# Patient Record
Sex: FEMALE | Race: WHITE | NOT HISPANIC OR LATINO | URBAN - METROPOLITAN AREA
[De-identification: names, ages, dates, MRNs, and addresses within clinical notes are randomized per-mention and may not be internally consistent; named-entity substitution may affect disease eponyms.]

---

## 2018-03-20 ENCOUNTER — APPOINTMENT (RX ONLY)
Dept: URBAN - METROPOLITAN AREA CLINIC 23 | Facility: CLINIC | Age: 83
Setting detail: DERMATOLOGY
End: 2018-03-20

## 2018-03-20 DIAGNOSIS — L90.5 SCAR CONDITIONS AND FIBROSIS OF SKIN: ICD-10-CM

## 2018-03-20 DIAGNOSIS — D18.0 HEMANGIOMA: ICD-10-CM

## 2018-03-20 DIAGNOSIS — L72.8 OTHER FOLLICULAR CYSTS OF THE SKIN AND SUBCUTANEOUS TISSUE: ICD-10-CM

## 2018-03-20 DIAGNOSIS — L82.0 INFLAMED SEBORRHEIC KERATOSIS: ICD-10-CM

## 2018-03-20 DIAGNOSIS — Z85.828 PERSONAL HISTORY OF OTHER MALIGNANT NEOPLASM OF SKIN: ICD-10-CM

## 2018-03-20 DIAGNOSIS — L30.4 ERYTHEMA INTERTRIGO: ICD-10-CM

## 2018-03-20 DIAGNOSIS — L82.1 OTHER SEBORRHEIC KERATOSIS: ICD-10-CM

## 2018-03-20 DIAGNOSIS — L57.0 ACTINIC KERATOSIS: ICD-10-CM

## 2018-03-20 DIAGNOSIS — L81.4 OTHER MELANIN HYPERPIGMENTATION: ICD-10-CM

## 2018-03-20 PROBLEM — L85.3 XEROSIS CUTIS: Status: ACTIVE | Noted: 2018-03-20

## 2018-03-20 PROBLEM — E78.5 HYPERLIPIDEMIA, UNSPECIFIED: Status: ACTIVE | Noted: 2018-03-20

## 2018-03-20 PROBLEM — K21.9 GASTRO-ESOPHAGEAL REFLUX DISEASE WITHOUT ESOPHAGITIS: Status: ACTIVE | Noted: 2018-03-20

## 2018-03-20 PROBLEM — D18.01 HEMANGIOMA OF SKIN AND SUBCUTANEOUS TISSUE: Status: ACTIVE | Noted: 2018-03-20

## 2018-03-20 PROBLEM — M12.9 ARTHROPATHY, UNSPECIFIED: Status: ACTIVE | Noted: 2018-03-20

## 2018-03-20 PROCEDURE — 17000 DESTRUCT PREMALG LESION: CPT | Mod: 59

## 2018-03-20 PROCEDURE — ? OBSERVATION

## 2018-03-20 PROCEDURE — 17110 DESTRUCTION B9 LES UP TO 14: CPT

## 2018-03-20 PROCEDURE — 17003 DESTRUCT PREMALG LES 2-14: CPT

## 2018-03-20 PROCEDURE — ? COUNSELING

## 2018-03-20 PROCEDURE — ? OTHER

## 2018-03-20 PROCEDURE — 99203 OFFICE O/P NEW LOW 30 MIN: CPT | Mod: 25

## 2018-03-20 PROCEDURE — ? LIQUID NITROGEN

## 2018-03-20 PROCEDURE — ? TREATMENT REGIMEN

## 2018-03-20 ASSESSMENT — LOCATION DETAILED DESCRIPTION DERM
LOCATION DETAILED: NASAL SUPRATIP
LOCATION DETAILED: RIGHT DISTAL POSTERIOR THIGH
LOCATION DETAILED: LEFT MID-UPPER BACK
LOCATION DETAILED: LEFT DISTAL POSTERIOR UPPER ARM
LOCATION DETAILED: RIGHT INFERIOR UPPER BACK
LOCATION DETAILED: LEFT INFERIOR LATERAL MIDBACK
LOCATION DETAILED: LEFT SUPERIOR LATERAL MIDBACK
LOCATION DETAILED: RIGHT INFERIOR MEDIAL MIDBACK
LOCATION DETAILED: LEFT SUPERIOR MEDIAL MALAR CHEEK
LOCATION DETAILED: NASAL DORSUM
LOCATION DETAILED: LEFT CENTRAL MALAR CHEEK
LOCATION DETAILED: RIGHT PROXIMAL PRETIBIAL REGION
LOCATION DETAILED: RIGHT RIB CAGE
LOCATION DETAILED: RIGHT ANTERIOR PROXIMAL UPPER ARM
LOCATION DETAILED: RIGHT LATERAL ABDOMEN
LOCATION DETAILED: LEFT RIB CAGE
LOCATION DETAILED: LEFT INFERIOR MEDIAL MIDBACK
LOCATION DETAILED: RIGHT SUPERIOR MEDIAL MIDBACK

## 2018-03-20 ASSESSMENT — LOCATION SIMPLE DESCRIPTION DERM
LOCATION SIMPLE: RIGHT UPPER BACK
LOCATION SIMPLE: RIGHT PRETIBIAL REGION
LOCATION SIMPLE: LEFT CHEEK
LOCATION SIMPLE: RIGHT LOWER BACK
LOCATION SIMPLE: LEFT MALAR CHEEK
LOCATION SIMPLE: RIGHT POSTERIOR THIGH
LOCATION SIMPLE: ABDOMEN
LOCATION SIMPLE: RIGHT UPPER ARM
LOCATION SIMPLE: NOSE
LOCATION SIMPLE: LEFT UPPER BACK
LOCATION SIMPLE: LEFT LOWER BACK
LOCATION SIMPLE: LEFT POSTERIOR UPPER ARM

## 2018-03-20 ASSESSMENT — LOCATION ZONE DERM
LOCATION ZONE: LEG
LOCATION ZONE: NOSE
LOCATION ZONE: FACE
LOCATION ZONE: ARM
LOCATION ZONE: TRUNK

## 2018-03-20 NOTE — PROCEDURE: LIQUID NITROGEN
Render Post-Care Instructions In Note?: no
Post-Care Instructions: I reviewed with the patient in detail post-care instructions. Patient is to wear sunprotection, and avoid picking at any of the treated lesions. Pt may apply Vaseline to crusted or scabbing areas.
Detail Level: Detailed
Number Of Freeze-Thaw Cycles: 1 freeze-thaw cycle
Detail Level: Simple
Duration Of Freeze Thaw-Cycle (Seconds): 0
Consent: The patient's verbal consent was obtained including but not limited to risks of crusting, scabbing, blistering, scarring, darker or lighter pigmentary change, recurrence, incomplete removal and infection.
Medical Necessity Information: It is in your best interest to select a reason for this procedure from the list below. All of these items fulfill various CMS LCD requirements except the new and changing color options.
Medical Necessity Clause: This procedure was medically necessary because the lesions that were treated were: rubbed

## 2018-03-20 NOTE — PROCEDURE: OTHER
Detail Level: Zone
Other (Free Text): 5mm, deeper. Offered to send to general surgeon but she would like to wait. Will see Pt back in 3mos and recheck area. Call if worse in any way. Pt and son indicate understanding.
Note Text (......Xxx Chief Complaint.): This diagnosis correlates with the

## 2018-03-20 NOTE — PROCEDURE: TREATMENT REGIMEN
Detail Level: Zone
Plan: Triple paste AF bid, zeasorb AF powder several times a day. Try to let air/ dry out a few times a day.

## 2018-03-29 ENCOUNTER — HOSPITAL ENCOUNTER (INPATIENT)
Age: 83
LOS: 7 days | Discharge: NURSING, REHAB OR LONG-TERM CARE | DRG: 308 | End: 2018-04-05
Attending: EMERGENCY MEDICINE | Admitting: INTERNAL MEDICINE
Payer: MEDICARE

## 2018-03-29 ENCOUNTER — APPOINTMENT (OUTPATIENT)
Dept: GENERAL RADIOLOGY | Age: 83
DRG: 308 | End: 2018-03-29
Attending: EMERGENCY MEDICINE
Payer: MEDICARE

## 2018-03-29 DIAGNOSIS — I48.91 ATRIAL FIBRILLATION WITH RVR (HCC): Primary | ICD-10-CM

## 2018-03-29 DIAGNOSIS — I50.9 ACUTE CONGESTIVE HEART FAILURE, UNSPECIFIED CONGESTIVE HEART FAILURE TYPE: ICD-10-CM

## 2018-03-29 LAB
ALBUMIN SERPL-MCNC: 3.6 G/DL (ref 3.2–4.6)
ALBUMIN/GLOB SERPL: 1.1 {RATIO} (ref 1.2–3.5)
ALP SERPL-CCNC: 61 U/L (ref 50–136)
ALT SERPL-CCNC: 29 U/L (ref 12–65)
AMORPH CRY URNS QL MICRO: ABNORMAL
ANION GAP SERPL CALC-SCNC: 8 MMOL/L (ref 7–16)
APPEARANCE UR: ABNORMAL
AST SERPL-CCNC: 41 U/L (ref 15–37)
ATRIAL RATE: 129 BPM
ATRIAL RATE: 267 BPM
BACTERIA URNS QL MICRO: ABNORMAL /HPF
BILIRUB SERPL-MCNC: 0.5 MG/DL (ref 0.2–1.1)
BILIRUB UR QL: NEGATIVE
BNP SERPL-MCNC: 351 PG/ML
BUN SERPL-MCNC: 17 MG/DL (ref 8–23)
CALCIUM SERPL-MCNC: 9.8 MG/DL (ref 8.3–10.4)
CALCULATED P AXIS, ECG09: 115 DEGREES
CALCULATED R AXIS, ECG10: -4 DEGREES
CALCULATED R AXIS, ECG10: -9 DEGREES
CALCULATED T AXIS, ECG11: 158 DEGREES
CALCULATED T AXIS, ECG11: 160 DEGREES
CASTS URNS QL MICRO: ABNORMAL /LPF
CHLORIDE SERPL-SCNC: 105 MMOL/L (ref 98–107)
CO2 SERPL-SCNC: 26 MMOL/L (ref 21–32)
COLOR UR: YELLOW
CREAT SERPL-MCNC: 0.56 MG/DL (ref 0.6–1)
DIAGNOSIS, 93000: NORMAL
DIAGNOSIS, 93000: NORMAL
DIGOXIN SERPL-MCNC: 0.8 NG/ML (ref 0.9–2.1)
EPI CELLS #/AREA URNS HPF: ABNORMAL /HPF
GLOBULIN SER CALC-MCNC: 3.3 G/DL (ref 2.3–3.5)
GLUCOSE SERPL-MCNC: 109 MG/DL (ref 65–100)
GLUCOSE UR STRIP.AUTO-MCNC: NEGATIVE MG/DL
HGB UR QL STRIP: NEGATIVE
KETONES UR QL STRIP.AUTO: NEGATIVE MG/DL
LACTATE BLD-SCNC: 2 MMOL/L (ref 0.5–1.9)
LEUKOCYTE ESTERASE UR QL STRIP.AUTO: ABNORMAL
MAGNESIUM SERPL-MCNC: 1.8 MG/DL (ref 1.8–2.4)
MUCOUS THREADS URNS QL MICRO: ABNORMAL /LPF
NITRITE UR QL STRIP.AUTO: NEGATIVE
PH UR STRIP: 7 [PH] (ref 5–9)
POTASSIUM SERPL-SCNC: 4.4 MMOL/L (ref 3.5–5.1)
PROCALCITONIN SERPL-MCNC: <0.1 NG/ML
PROT SERPL-MCNC: 6.9 G/DL (ref 6.3–8.2)
PROT UR STRIP-MCNC: NEGATIVE MG/DL
Q-T INTERVAL, ECG07: 330 MS
Q-T INTERVAL, ECG07: 366 MS
QRS DURATION, ECG06: 150 MS
QRS DURATION, ECG06: 156 MS
QTC CALCULATION (BEZET), ECG08: 472 MS
QTC CALCULATION (BEZET), ECG08: 474 MS
RBC #/AREA URNS HPF: ABNORMAL /HPF
SODIUM SERPL-SCNC: 139 MMOL/L (ref 136–145)
SP GR UR REFRACTOMETRY: 1.01 (ref 1–1.02)
TROPONIN I BLD-MCNC: 0.02 NG/ML (ref 0.02–0.05)
UROBILINOGEN UR QL STRIP.AUTO: 0.2 EU/DL (ref 0.2–1)
VENTRICULAR RATE, ECG03: 100 BPM
VENTRICULAR RATE, ECG03: 124 BPM
WBC URNS QL MICRO: ABNORMAL /HPF

## 2018-03-29 PROCEDURE — 74011000258 HC RX REV CODE- 258: Performed by: EMERGENCY MEDICINE

## 2018-03-29 PROCEDURE — 83605 ASSAY OF LACTIC ACID: CPT

## 2018-03-29 PROCEDURE — 80162 ASSAY OF DIGOXIN TOTAL: CPT | Performed by: EMERGENCY MEDICINE

## 2018-03-29 PROCEDURE — 96375 TX/PRO/DX INJ NEW DRUG ADDON: CPT | Performed by: EMERGENCY MEDICINE

## 2018-03-29 PROCEDURE — 83735 ASSAY OF MAGNESIUM: CPT | Performed by: EMERGENCY MEDICINE

## 2018-03-29 PROCEDURE — 85025 COMPLETE CBC W/AUTO DIFF WBC: CPT | Performed by: EMERGENCY MEDICINE

## 2018-03-29 PROCEDURE — 96365 THER/PROPH/DIAG IV INF INIT: CPT | Performed by: EMERGENCY MEDICINE

## 2018-03-29 PROCEDURE — 74011000250 HC RX REV CODE- 250: Performed by: EMERGENCY MEDICINE

## 2018-03-29 PROCEDURE — 74011250636 HC RX REV CODE- 250/636: Performed by: EMERGENCY MEDICINE

## 2018-03-29 PROCEDURE — 99285 EMERGENCY DEPT VISIT HI MDM: CPT | Performed by: EMERGENCY MEDICINE

## 2018-03-29 PROCEDURE — 93005 ELECTROCARDIOGRAM TRACING: CPT | Performed by: INTERNAL MEDICINE

## 2018-03-29 PROCEDURE — 71045 X-RAY EXAM CHEST 1 VIEW: CPT

## 2018-03-29 PROCEDURE — 77030019605

## 2018-03-29 PROCEDURE — 87040 BLOOD CULTURE FOR BACTERIA: CPT | Performed by: EMERGENCY MEDICINE

## 2018-03-29 PROCEDURE — 93005 ELECTROCARDIOGRAM TRACING: CPT | Performed by: EMERGENCY MEDICINE

## 2018-03-29 PROCEDURE — 96376 TX/PRO/DX INJ SAME DRUG ADON: CPT | Performed by: EMERGENCY MEDICINE

## 2018-03-29 PROCEDURE — 80053 COMPREHEN METABOLIC PANEL: CPT | Performed by: EMERGENCY MEDICINE

## 2018-03-29 PROCEDURE — 81003 URINALYSIS AUTO W/O SCOPE: CPT | Performed by: EMERGENCY MEDICINE

## 2018-03-29 PROCEDURE — 65660000000 HC RM CCU STEPDOWN

## 2018-03-29 PROCEDURE — 84145 PROCALCITONIN (PCT): CPT | Performed by: EMERGENCY MEDICINE

## 2018-03-29 PROCEDURE — 83880 ASSAY OF NATRIURETIC PEPTIDE: CPT | Performed by: EMERGENCY MEDICINE

## 2018-03-29 PROCEDURE — 84484 ASSAY OF TROPONIN QUANT: CPT

## 2018-03-29 RX ORDER — LEVOTHYROXINE SODIUM 50 UG/1
50 TABLET ORAL
Status: DISCONTINUED | OUTPATIENT
Start: 2018-03-30 | End: 2018-04-05 | Stop reason: HOSPADM

## 2018-03-29 RX ORDER — DILTIAZEM HYDROCHLORIDE 5 MG/ML
10 INJECTION INTRAVENOUS ONCE
Status: COMPLETED | OUTPATIENT
Start: 2018-03-29 | End: 2018-03-29

## 2018-03-29 RX ORDER — PRAVASTATIN SODIUM 80 MG/1
80 TABLET ORAL DAILY
Status: DISCONTINUED | OUTPATIENT
Start: 2018-03-30 | End: 2018-04-05 | Stop reason: HOSPADM

## 2018-03-29 RX ORDER — FUROSEMIDE 10 MG/ML
40 INJECTION INTRAMUSCULAR; INTRAVENOUS
Status: COMPLETED | OUTPATIENT
Start: 2018-03-29 | End: 2018-03-29

## 2018-03-29 RX ORDER — FUROSEMIDE 10 MG/ML
40 INJECTION INTRAMUSCULAR; INTRAVENOUS 2 TIMES DAILY
Status: DISCONTINUED | OUTPATIENT
Start: 2018-03-30 | End: 2018-03-30

## 2018-03-29 RX ORDER — SUCRALFATE 1 G/1
1 TABLET ORAL
Status: DISCONTINUED | OUTPATIENT
Start: 2018-03-30 | End: 2018-04-05 | Stop reason: HOSPADM

## 2018-03-29 RX ORDER — FENOFIBRATE 160 MG/1
160 TABLET ORAL DAILY
Status: DISCONTINUED | OUTPATIENT
Start: 2018-03-30 | End: 2018-04-05 | Stop reason: HOSPADM

## 2018-03-29 RX ORDER — CARVEDILOL 6.25 MG/1
6.25 TABLET ORAL 2 TIMES DAILY WITH MEALS
Status: DISCONTINUED | OUTPATIENT
Start: 2018-03-30 | End: 2018-03-31

## 2018-03-29 RX ORDER — METOPROLOL TARTRATE 5 MG/5ML
5 INJECTION INTRAVENOUS
Status: DISCONTINUED | OUTPATIENT
Start: 2018-03-29 | End: 2018-03-29

## 2018-03-29 RX ORDER — ASPIRIN 81 MG/1
81 TABLET ORAL DAILY
Status: DISCONTINUED | OUTPATIENT
Start: 2018-03-30 | End: 2018-04-05 | Stop reason: HOSPADM

## 2018-03-29 RX ORDER — SPIRONOLACTONE 25 MG/1
50 TABLET ORAL DAILY
Status: DISCONTINUED | OUTPATIENT
Start: 2018-03-30 | End: 2018-03-31

## 2018-03-29 RX ORDER — PANTOPRAZOLE SODIUM 40 MG/1
40 TABLET, DELAYED RELEASE ORAL
Status: DISCONTINUED | OUTPATIENT
Start: 2018-03-30 | End: 2018-04-05 | Stop reason: HOSPADM

## 2018-03-29 RX ORDER — DULOXETIN HYDROCHLORIDE 60 MG/1
60 CAPSULE, DELAYED RELEASE ORAL DAILY
Status: DISCONTINUED | OUTPATIENT
Start: 2018-03-30 | End: 2018-04-05 | Stop reason: HOSPADM

## 2018-03-29 RX ORDER — POLYETHYLENE GLYCOL 3350 17 G/17G
17 POWDER, FOR SOLUTION ORAL DAILY
Status: DISCONTINUED | OUTPATIENT
Start: 2018-03-30 | End: 2018-04-05 | Stop reason: HOSPADM

## 2018-03-29 RX ORDER — CLOPIDOGREL BISULFATE 75 MG/1
75 TABLET ORAL DAILY
Status: DISCONTINUED | OUTPATIENT
Start: 2018-03-30 | End: 2018-03-30

## 2018-03-29 RX ORDER — ASPIRIN 81 MG/1
81 TABLET ORAL DAILY
COMMUNITY

## 2018-03-29 RX ORDER — CARVEDILOL 6.25 MG/1
6.25 TABLET ORAL 2 TIMES DAILY WITH MEALS
COMMUNITY

## 2018-03-29 RX ORDER — ENOXAPARIN SODIUM 100 MG/ML
1 INJECTION SUBCUTANEOUS EVERY 12 HOURS
Status: DISCONTINUED | OUTPATIENT
Start: 2018-03-30 | End: 2018-03-30

## 2018-03-29 RX ADMIN — FUROSEMIDE 40 MG: 10 INJECTION, SOLUTION INTRAMUSCULAR; INTRAVENOUS at 20:27

## 2018-03-29 RX ADMIN — DILTIAZEM HYDROCHLORIDE 10 MG: 5 INJECTION INTRAVENOUS at 19:14

## 2018-03-29 RX ADMIN — DILTIAZEM HYDROCHLORIDE 10 MG: 5 INJECTION INTRAVENOUS at 20:22

## 2018-03-29 RX ADMIN — SODIUM CHLORIDE 10 MG/HR: 900 INJECTION, SOLUTION INTRAVENOUS at 19:39

## 2018-03-29 NOTE — IP AVS SNAPSHOT
[facility] 
 
 
 [address] 
[phone] Patient: Magdalena Hurtado MRN: [id number] :[id number] About your hospitalization You were admitted on:  2018 You last received care in the:  [facility] 3 TELEMETRY You were discharged on:  2018 Why you were hospitalized Your primary diagnosis was:  Atrial Fibrillation With Rvr (Hcc) Your diagnoses also included:  Cad (Coronary Artery Disease), Hld (Hyperlipidemia), Htn (Hypertension), Chf (Congestive Heart Failure) (Hcc) Follow-up Information Follow up With Details Comments Contact Info [facility]  Remember to ask for MD about Cardic Rehab at follow up appt. [address] Lilia Barrientos [address] 
[phone] Zhao Vick MD  As needed [facility] [address] 
[phone] Shelley Rushing MD   @ 11:45 in [place] office  [address] [place] [id number] 
[phone] Your Scheduled Appointments  11:45 AM EDT HOSPITAL FOLLOW-UP with Shelley Rushing MD  
One [facility] ([address] [place] [address]) [address]  
[address]  
[phone] 2018  1:00 PM EDT Office Visit with Shelley Rushing MD  
[facility] CARDIOLOGY [place] OFFICE ([address] [place] [address]) [address] [place] [address]  
[phone] Discharge Orders None A check jose angel indicates which time of day the medication should be taken. My Medications START taking these medications Instructions Each Dose to Equal  
 Morning Noon Evening Bedtime  
 amiodarone 200 mg tablet Commonly known as:  CORDARONE Your last dose was: Your next dose is: Take 1 Tab by mouth two (2) times a day.  Take 200 mg twice a day for 14 days then decrease to once a day . 200 mg  
    
   
   
   
  
 apixaban 2.5 mg tablet Commonly known as:  Patsterling Boom Your last dose was: Your next dose is: Take 1 Tab by mouth two (2) times a day. 2.5 mg  
    
   
   
   
  
 furosemide 40 mg tablet Commonly known as:  LASIX Your last dose was: Your next dose is: Take 1 Tab by mouth two (2) times a day. 40 mg  
    
   
   
   
  
 lisinopril 2.5 mg tablet Commonly known as:  Edilma Vela Start taking on:  4/6/2018 Your last dose was: Your next dose is: Take 1 Tab by mouth daily. 2.5 mg  
    
   
   
   
  
 nitrofurantoin 50 mg capsule Commonly known as:  MACRODANTIN Your last dose was: Your next dose is: Take 1 Cap by mouth every six (6) hours for 5 days. 50 mg CONTINUE taking these medications Instructions Each Dose to Equal  
 Morning Noon Evening Bedtime  
 aspirin delayed-release 81 mg tablet Your last dose was: Your next dose is: Take 81 mg by mouth daily. 81 mg CALCIUM 600 + D 600-125 mg-unit Tab Generic drug:  calcium-cholecalciferol (d3) Your last dose was: Your next dose is: Take 500 mg by mouth. Indications: HYPOCALCEMIA  
 500 mg CARAFATE 1 gram tablet Generic drug:  sucralfate Your last dose was: Your next dose is: Take 1 g by mouth four (4) times daily. 1 g  
    
   
   
   
  
 COREG 6.25 mg tablet Generic drug:  carvedilol Your last dose was: Your next dose is: Take 3.125 mg by mouth two (2) times daily (with meals). 3.125 mg  
    
   
   
   
  
 CYMBALTA 60 mg capsule Generic drug:  DULoxetine Your last dose was: Your next dose is:    
   
   
 take 60 mg by mouth daily.   
 60 mg  
    
   
   
 fenofibrate micronized 134 mg capsule Commonly known as:  LOFIBRA Your last dose was: Your next dose is: Take  by mouth every morning. fentaNYL 25 mcg/hr PATCH Commonly known as:  Mars Duet Your last dose was: Your next dose is:    
   
   
 1 Patch by TransDERmal route every seventy-two (72) hours. 1 Patch FLONASE 50 mcg/actuation nasal spray Generic drug:  fluticasone Your last dose was: Your next dose is: 2 Sprays by Both Nostrils route daily. 2 Spray  
    
   
   
   
  
 gabapentin 400 mg capsule Commonly known as:  NEURONTIN Your last dose was: Your next dose is: Take 400 mg by mouth three (3) times daily. 400 mg  
    
   
   
   
  
 IMITREX 100 mg tablet Generic drug:  SUMAtriptan Your last dose was: Your next dose is:    
   
   
 take 100 mg by mouth once as needed for Migraine. 100 mg  
    
   
   
   
  
 iron, carbonyl 45 mg Tab Commonly known as:  Shala Kyle Your last dose was: Your next dose is: Take 1 Tab by mouth daily. 1 Tab MIRALAX 17 gram packet Generic drug:  polyethylene glycol Your last dose was: Your next dose is: Take 17 g by mouth daily. 17 g  
    
   
   
   
  
 multivitamin tablet Commonly known as:  ONE A DAY Your last dose was: Your next dose is: Take 1 Tab by mouth daily. 1 Tab  
    
   
   
   
  
 nitrofurantoin 100 mg Cap Your last dose was: Your next dose is: Take  by mouth two (2) times a day. nitroglycerin 0.4 mg SL tablet Commonly known as:  NITROSTAT Your last dose was: Your next dose is:    
   
   
 1 Tab by SubLINGual route every five (5) minutes as needed for Chest Pain.   
 0.4 mg  
    
   
   
   
  
 omeprazole 20 mg capsule Commonly known as:  PRILOSEC Your last dose was: Your next dose is: Take 20 mg by mouth two (2) times a day. 20 mg  
    
   
   
   
  
 oxymetazoline 0.05 % nasal spray Commonly known as:  Marisabel Soraida Your last dose was: Your next dose is: 2 Sprays two (2) times a day. 2 Spray  
    
   
   
   
  
 pravastatin 80 mg tablet Commonly known as:  PRAVACHOL Your last dose was: Your next dose is:    
   
   
 take 80 mg by mouth daily. 80 mg  
    
   
   
   
  
 spironolactone 50 mg tablet Commonly known as:  ALDACTONE Your last dose was: Your next dose is: Take 50 mg by mouth daily. 50 mg  
    
   
   
   
  
 synthroid 50 mcg tablet Generic drug:  levothyroxine Your last dose was: Your next dose is:    
   
   
 take 50 mcg by mouth daily. 50 mcg VITAMIN D3 2,000 unit Tab Generic drug:  cholecalciferol (vitamin D3) Your last dose was: Your next dose is: Take  by mouth. STOP taking these medications   
 digoxin 0.125 mg tablet Commonly known as:  LANOXIN  
   
  
 HYDROcodone-acetaminophen 7.5-325 mg per tablet Commonly known as:  NORCO  
   
  
 LORazepam 1 mg tablet Commonly known as:  ATIVAN  
   
  
 oxyCODONE-acetaminophen  mg per tablet Commonly known as:  PERCOCET ASK your doctor about these medications Instructions Each Dose to Equal  
 Morning Noon Evening Bedtime PLAVIX 75 mg Tab Generic drug:  clopidogrel Your last dose was: Your next dose is:    
   
   
 take 75 mg by mouth daily. 75 mg Where to Get Your Medications Information on where to get these meds will be given to you by the nurse or doctor. ! Ask your nurse or doctor about these medications  
  amiodarone 200 mg tablet apixaban 2.5 mg tablet  
 furosemide 40 mg tablet  
 lisinopril 2.5 mg tablet  
 nitrofurantoin 50 mg capsule Opioid Education Prescription Opioids: What You Need to Know: 
 
Prescription opioids can be used to help relieve moderate-to-severe pain and are often prescribed following a surgery or injury, or for certain health conditions. These medications can be an important part of treatment but also come with serious risks. Opioids are strong pain medicines. Examples include hydrocodone, oxycodone, fentanyl, and morphine. Heroin is an example of an illegal opioid. It is important to work with your health care provider to make sure you are getting the safest, most effective care. WHAT ARE THE RISKS AND SIDE EFFECTS OF OPIOID USE? Prescription opioids carry serious risks of addiction and overdose, especially with prolonged use. An opioid overdose, often marked by slow breathing, can cause sudden death. The use of prescription opioids can have a number of side effects as well, even when taken as directed. · Tolerance-meaning you might need to take more of a medication for the same pain relief · Physical dependence-meaning you have symptoms of withdrawal when the medication is stopped. Withdrawal symptoms can include nausea, sweating, chills, diarrhea, stomach cramps, and muscle aches. Withdrawal can last up to several weeks, depending on which drug you took and how long you took it. · Increased sensitivity to pain · Constipation · Nausea, vomiting, and dry mouth · Sleepiness and dizziness · Confusion · Depression · Low levels of testosterone that can result in lower sex drive, energy, and strength · Itching and sweating RISKS ARE GREATER WITH:      
· History of drug misuse, substance use disorder, or overdose · Mental health conditions (such as depression or anxiety) · Sleep apnea · Older age (72 years or older) · Pregnancy Avoid alcohol while taking prescription opioids. Also, unless specifically advised by your health care provider, medications to avoid include: · Benzodiazepines (such as Xanax or Valium) · Muscle relaxants (such as Soma or Flexeril) · Hypnotics (such as Ambien or Lunesta) · Other prescription opioids KNOW YOUR OPTIONS Talk to your health care provider about ways to manage your pain that don't involve prescription opioids. Some of these options may actually work better and have fewer risks and side effects. Options may include: 
· Pain relievers such as acetaminophen, ibuprofen, and naproxen · Some medications that are also used for depression or seizures · Physical therapy and exercise · Counseling to help patients learn how to cope better with triggers of pain and stress. · Application of heat or cold compress · Massage therapy · Relaxation techniques Be Informed Make sure you know the name of your medication, how much and how often to take it, and its potential risks & side effects. IF YOU ARE PRESCRIBED OPIOIDS FOR PAIN: 
· Never take opioids in greater amounts or more often than prescribed. Remember the goal is not to be pain-free but to manage your pain at a tolerable level. · Follow up with your primary care provider to: · Work together to create a plan on how to manage your pain. · Talk about ways to help manage your pain that don't involve prescription opioids. · Talk about any and all concerns and side effects. · Help prevent misuse and abuse. · Never sell or share prescription opioids · Help prevent misuse and abuse. · Store prescription opioids in a secure place and out of reach of others (this may include visitors, children, friends, and family).  
· Safely dispose of unused/unwanted prescription opioids: Find your community drug take-back program or your pharmacy mail-back program, or flush them down the toilet, following guidance from the Food and Drug Administration (www.fda.gov/Drugs/ResourcesForYou). · Visit www.cdc.gov/drugoverdose to learn about the risks of opioid abuse and overdose. · If you believe you may be struggling with addiction, tell your health care provider and ask for guidance or call Bel Aguirre at [phone]-TVJR. Discharge Instructions Deciding Between Electrical Cardioversion and Rate Control Medicines for Atrial Fibrillation Deciding Between Electrical Cardioversion and Rate Control Medicines for Atrial Fibrillation What is atrial fibrillation? Atrial fibrillation (say \"AY-tree-anastasia xve-xemo-ZIK-shun\") is a kind of uneven heartbeat. It can make you feel lightheaded and dizzy. You may feel weak. It also can make you more likely to have a stroke. Electrical cardioversion can return your heart to a normal rhythm. First you'll get medicines to make you sleepy and control pain. Then your doctor will use patches to send an electric current to your heart. This resets the rhythm of your heart. Not everyone with atrial fibrillation needs this treatment. For some people, taking medicines may be better. Most people can live with an uneven heartbeat. It just has to be kept under control so the heart does not beat too fast. 
Use this information to help you and your doctor decide which treatment to choose for atrial fibrillation. What are boudreaux points about this decision? · Electrical cardioversion can return your heart to a normal rhythm. But the problem can come back. The longer you have had atrial fibrillation, the more likely it is to come back after this treatment. · Cardioversion may not work as well when an uneven heartbeat is caused by another heart disease, such as heart failure. · If your symptoms bother you a lot, you may want to try cardioversion. But even if it works, you may still need to take blood thinners to prevent a stroke. · If you don't have symptoms, or if they don't bother you much, you can try medicines to slow your heart rate. And you can take blood thinners to prevent a stroke. · Cardioversion does have risks, such as stroke. Discuss the risks with your doctor. Make sure you understand them. · You may have more than one heart problem. Cardioversion doesn't work as well if you have more than one heart problem. Why might you choose electrical cardioversion? · It restores the normal heart rhythm for most people. · The idea of having an electric shock does not bother you. · Your symptoms bother you a lot. · You have had atrial fibrillation just one time. · You do not have other heart problems. · You may not have to take as many medicines. Or you may not need to take them as long. Why might you choose rate-control medicines? · These medicines keep many people from having symptoms. · You prefer to take medicines rather than have an electric shock. · Your symptoms don't bother you much. · If these medicines don't work, you can still try electrical cardioversion. Your decision Thinking about the facts and your feelings can help you make a decision that is right for you. Be sure you understand the benefits and risks of your options. And think about what else you need to do before you make the decision. Where can you learn more? Go to http://leo-shmuel.info/. Enter C943 in the search box to learn more about \"Deciding Between Electrical Cardioversion and Rate Control Medicines for Atrial Fibrillation. \" Current as of: September 21, 2016 Content Version: 11.4 © 5625-6030 [employer]. Care instructions adapted under license by [employer] (which disclaims liability or warranty for this information).  If you have questions about a medical condition or this instruction, always ask your healthcare professional. [employer] disclaims any warranty or liability for your use of this information. DISCHARGE SUMMARY from Nurse PATIENT INSTRUCTIONS: 
 
After general anesthesia or intravenous sedation, for 24 hours or while taking prescription Narcotics: · Limit your activities · Do not drive and operate hazardous machinery · Do not make important personal or business decisions · Do  not drink alcoholic beverages · If you have not urinated within 8 hours after discharge, please contact your surgeon on call. Report the following to your surgeon: 
· Excessive pain, swelling, redness or odor of or around the surgical area · Temperature over 100.5 · Nausea and vomiting lasting longer than 4 hours or if unable to take medications · Any signs of decreased circulation or nerve impairment to extremity: change in color, persistent  numbness, tingling, coldness or increase pain · Any questions What to do at Home: *  Please give a list of your current medications to your Primary Care Provider. *  Please update this list whenever your medications are discontinued, doses are 
    changed, or new medications (including over-the-counter products) are added. *  Please carry medication information at all times in case of emergency situations. These are general instructions for a healthy lifestyle: No smoking/ No tobacco products/ Avoid exposure to second hand smoke Surgeon General's Warning:  Quitting smoking now greatly reduces serious risk to your health. Obesity, smoking, and sedentary lifestyle greatly increases your risk for illness A healthy diet, regular physical exercise & weight monitoring are important for maintaining a healthy lifestyle You may be retaining fluid if you have a history of heart failure or if you experience any of the following symptoms:  Weight gain of 3 pounds or more overnight or 5 pounds in a week, increased swelling in our hands or feet or shortness of breath while lying flat in bed. Please call your doctor as soon as you notice any of these symptoms; do not wait until your next office visit. Recognize signs and symptoms of STROKE: 
 
F-face looks uneven A-arms unable to move or move unevenly S-speech slurred or non-existent T-time-call 911 as soon as signs and symptoms begin-DO NOT go Back to bed or wait to see if you get better-TIME IS BRAIN. Warning Signs of HEART ATTACK Call 911 if you have these symptoms: 
? Chest discomfort. Most heart attacks involve discomfort in the center of the chest that lasts more than a few minutes, or that goes away and comes back. It can feel like uncomfortable pressure, squeezing, fullness, or pain. ? Discomfort in other areas of the upper body. Symptoms can include pain or discomfort in one or both arms, the back, neck, jaw, or stomach. ? Shortness of breath with or without chest discomfort. ? Other signs may include breaking out in a cold sweat, nausea, or lightheadedness. Don't wait more than five minutes to call [address]! Fast action can save your life. Calling 911 is almost always the fastest way to get lifesaving treatment. Emergency Medical Services staff can begin treatment when they arrive  up to an hour sooner than if someone gets to the hospital by car. The discharge information has been reviewed with the patient. The patient verbalized understanding. Discharge medications reviewed with the patient and appropriate educational materials and side effects teaching were provided. ___________________________________________________________________________________________________________________________________ MyChart Announcement We are excited to announce that we are making your provider's discharge notes available to you in BigTeamshart.   You will see these notes when they are completed and signed by the physician that discharged you from your recent hospital stay. If you have any questions or concerns about any information you see in MyLuvs, please call the Health Information Department where you were seen or reach out to your Primary Care Provider for more information about your plan of care. Introducing [facility]! [employer] introduces MyLuvs patient portal. Now you can access parts of your medical record, email your doctor's office, and request medication refills online. 1. In your internet browser, go to [email or web address] 2. Click on the First Time User? Click Here link in the Sign In box. You will see the New Member Sign Up page. 3. Enter your MyLuvs Access Code exactly as it appears below. You will not need to use this code after youve completed the sign-up process. If you do not sign up before the expiration date, you must request a new code. · MyLuvs Access Code: [email or web address] Expires: 6/27/2018  7:05 PM 
 
4. Enter the last four digits of your Social Security Number (xxxx) and Date of Birth (mm/dd/yyyy) as indicated and click Submit. You will be taken to the next sign-up page. 5. Create a MyLuvs ID. This will be your MyLuvs login ID and cannot be changed, so think of one that is secure and easy to remember. 6. Create a MyLuvs password. You can change your password at any time. 7. Enter your Password Reset Question and Answer. This can be used at a later time if you forget your password. 8. Enter your e-mail address. You will receive e-mail notification when new information is available in [address] 9. Click Sign Up. You can now view and download portions of your medical record. 10. Click the Download Summary menu link to download a portable copy of your medical information.  
 
If you have questions, please visit the Frequently Asked Questions section of the [employer]. Remember, MyChart is NOT to be used for urgent needs. For medical emergencies, dial 911. Now available from your iPhone and Android! Introducing Juan Carmona As a Gisselle Campbell patient, I wanted to make you aware of our electronic visit tool called Juan Carmona. [employer] 24/7 allows you to connect within minutes with a medical provider 24 hours a day, seven days a week via a mobile device or tablet or logging into a secure website from your computer. You can access Juan Danielsfin from anywhere in the [place]. A virtual visit might be right for you when you have a simple condition and feel like you just dont want to get out of bed, or cant get away from work for an appointment, when your regular [facility] provider is not available (evenings, weekends or holidays), or when youre out of town and need minor care. Electronic visits cost only $49 and if the [employer] 24/7 provider determines a prescription is needed to treat your condition, one can be electronically transmitted to a nearby pharmacy*. Please take a moment to enroll today if you have not already done so. The enrollment process is free and takes just a few minutes. To enroll, please download the [employer] 24/7 stefano to your tablet or phone, or visit [email or web address] to enroll on your computer. And, as an [address] [place] [address] patient with a Handy account, the results of your visits will be scanned into your electronic medical record and your primary care provider will be able to view the scanned results. We urge you to continue to see your regular [facility] provider for your ongoing medical care. And while your primary care provider may not be the one available when you seek a Juan Carmona virtual visit, the peace of mind you get from getting a real diagnosis real time can be priceless. For more information on Juan Carmona, view our Frequently Asked Questions (FAQs) at [email or web address]. Sincerely, 
 
Shane Snyder MD 
Chief Medical Officer [employer] *:  certain medications cannot be prescribed via Juan Clevelandtessy Unresulted Labs-Please follow up with your PCP about these lab tests Order Current Status XR BA SWALLOW ESOPHOGRAM In process CBC WITH AUTOMATED DIFF Preliminary result Providers Seen During Your Hospitalization Provider Specialty Primary office phone Demetrius Brooks MD Emergency Medicine [phone] Tyra Giordano MD Cardiology [phone] Immunizations Administered for This Admission Name Date  
 TB Skin Test (PPD) Intradermal  Deferred (), 3/30/2018 Your Primary Care Physician (PCP) Primary Care Physician Office Phone Office Fax Edwige Pacheco [phone] You are allergic to the following Allergen Reactions Keflex (Cephalexin) Other (comments) Pcn (Penicillins) Rash  
    
 Sulfa (Sulfonamide Antibiotics) Rash Recent Documentation Height Weight Breastfeeding? BMI OB Status Smoking Status 1.575 m 54.2 kg No 21.86 kg/m2 Postmenopausal Never Smoker Emergency Contacts Name Discharge Info Relation Home Work Mobile Galileo Sauer  Child [2] [phone] Nidhi Saucedo  Child [2] [phone] Patient Belongings The following personal items are in your possession at time of discharge: 
  Dental Appliances: At bedside, Lowers, Uppers  Visual Aid: None   Hearing Aids/Status: At home  Home Medications: None   Jewelry: None  Clothing: At bedside, Footwear, Pants, Shirt, Socks, Undergarments    Other Valuables: At bedside, Cell Phone, Purse  Personal Items Sent to Safe: n/a Discharge Instructions Attachments/References CARDIAC REHABILITATION (ENGLISH) Patient Handouts Cardiac Rehabilitation: Care Instructions Your Care Instructions Cardiac rehabilitation is a program for people who have a heart problem, such as a heart attack, heart failure, or a heart valve disease. The program includes exercise, lifestyle changes, education, and emotional support. Cardiac rehab can help you improve the quality of your life through better overall health. It can help you lose weight and feel better about yourself. On your cardiac rehab team, you may have your doctor, a nurse specialist, a dietitian, and a physical therapist. They will design your cardiac rehab program specifically for you. You will learn how to reduce your risk for heart problems, how to manage stress, and how to eat a heart-healthy diet. By the end of the program, you will be ready to maintain a healthier lifestyle on your own. Follow-up care is a key part of your treatment and safety. Be sure to make and go to all appointments, and call your doctor if you are having problems. It's also a good idea to know your test results and keep a list of the medicines you take. How can you care for yourself at home? · Take your medicines exactly as prescribed. Call your doctor if you think you are having a problem with your medicine. You will get more details on the specific medicines your doctor prescribes. · Weigh yourself every day if your doctor tells you to. Watch for sudden weight gain. Weigh yourself on the same scale with the same amount of clothing at the same time of day. · Plan your meals so that you are eating heart-healthy foods. ¨ Eat a variety of foods daily. Fresh fruits and vegetables and whole-grains are good choices. ¨ Limit your fat intake, especially saturated and trans fat. ¨ Limit salt (sodium). ¨ Increase fiber in your diet. ¨ Limit alcohol. · Learn how to take your pulse so that you can track your heart rate during exercise.  
· Always check with your doctor before you begin a new exercise program. 
 · Warm up before you exercise and cool down afterward for at least 15 minutes each. This will help your heart gradually prepare for and recover from exercise and avoid pushing your heart too hard. · Stop exercising if you have any unusual discomfort, such as chest pain. · Do not smoke. Smoking can make heart problems worse. If you need help quitting, talk to your doctor about stop-smoking programs and medicines. These can increase your chances of quitting for good. When should you call for help? Call 911 anytime you think you may need emergency care. For example, call if: 
? · You have severe trouble breathing. ? · You cough up pink, foamy mucus and you have trouble breathing. ? · You have symptoms of a heart attack. These may include: ¨ Chest pain or pressure, or a strange feeling in the chest. 
¨ Sweating. ¨ Shortness of breath. ¨ Nausea or vomiting. ¨ Pain, pressure, or a strange feeling in the back, neck, jaw, or upper belly or in one or both shoulders or arms. ¨ Lightheadedness or sudden weakness. ¨ A fast or irregular heartbeat. After you call 911, the  may tell you to chew 1 adult-strength or 2 to 4 low-dose aspirin. Wait for an ambulance. Do not try to drive yourself. ? · You have angina symptoms (such as chest pain or pressure) that do not go away with rest or are not getting better within 5 minutes after you take a dose of nitroglycerin. ? · You have symptoms of a stroke. These may include: 
¨ Sudden numbness, tingling, weakness, or loss of movement in your face, arm, or leg, especially on only one side of your body. ¨ Sudden vision changes. ¨ Sudden trouble speaking. ¨ Sudden confusion or trouble understanding simple statements. ¨ Sudden problems with walking or balance. ¨ A sudden, severe headache that is different from past headaches. ? · You passed out (lost consciousness). ?Call your doctor now or seek immediate medical care if: ? · You have new or increased shortness of breath. ? · You are dizzy or lightheaded, or you feel like you may faint. ? · You gain weight suddenly, such as more than 2 to 3 pounds in a day or 5 pounds in a week. (Your doctor may suggest a different range of weight gain.) ? · You have increased swelling in your legs, ankles, or feet. ? Watch closely for changes in your health, and be sure to contact your doctor if you have any problems. Where can you learn more? Go to http://leo-shmuel.info/. Enter W192 in the search box to learn more about \"Cardiac Rehabilitation: Care Instructions. \" Current as of: September 21, 2016 Content Version: 11.4 © 0214-0905 [employer]. Care instructions adapted under license by [employer] (which disclaims liability or warranty for this information). If you have questions about a medical condition or this instruction, always ask your healthcare professional. [address] any warranty or liability for your use of this information. Please provide this summary of care documentation to your next provider. Signatures-by signing, you are acknowledging that this After Visit Summary has been reviewed with you and you have received a copy. Patient Signature:  ____________________________________________________________ Date:  ____________________________________________________________  
  
Nolberto Cart Provider Signature:  ____________________________________________________________ Date:  ____________________________________________________________

## 2018-03-29 NOTE — IP AVS SNAPSHOT
[address] [place] [address] 
[phone] Patient: Flor Reid MRN: [id number] :[id number] A check jose angel indicates which time of day the medication should be taken. My Medications START taking these medications Instructions Each Dose to Equal  
 Morning Noon Evening Bedtime  
 amiodarone 200 mg tablet Commonly known as:  CORDARONE Your last dose was: Your next dose is: Take 1 Tab by mouth two (2) times a day. Take 200 mg twice a day for 14 days then decrease to once a day . 200 mg  
    
   
   
   
  
 apixaban 2.5 mg tablet Commonly known as:  Carmen Benjamin Your last dose was: Your next dose is: Take 1 Tab by mouth two (2) times a day. 2.5 mg  
    
   
   
   
  
 furosemide 40 mg tablet Commonly known as:  LASIX Your last dose was: Your next dose is: Take 1 Tab by mouth two (2) times a day. 40 mg  
    
   
   
   
  
 lisinopril 2.5 mg tablet Commonly known as:  Kem Allred Start taking on:  2018 Your last dose was: Your next dose is: Take 1 Tab by mouth daily. 2.5 mg  
    
   
   
   
  
 nitrofurantoin 50 mg capsule Commonly known as:  MACRODANTIN Your last dose was: Your next dose is: Take 1 Cap by mouth every six (6) hours for 5 days. 50 mg CONTINUE taking these medications Instructions Each Dose to Equal  
 Morning Noon Evening Bedtime  
 aspirin delayed-release 81 mg tablet Your last dose was: Your next dose is: Take 81 mg by mouth daily. 81 mg CALCIUM 600 + D 600-125 mg-unit Tab Generic drug:  calcium-cholecalciferol (d3) Your last dose was: Your next dose is: Take 500 mg by mouth.  Indications: HYPOCALCEMIA  
 500 mg  
    
   
   
   
 CARAFATE 1 gram tablet Generic drug:  sucralfate Your last dose was: Your next dose is: Take 1 g by mouth four (4) times daily. 1 g  
    
   
   
   
  
 COREG 6.25 mg tablet Generic drug:  carvedilol Your last dose was: Your next dose is: Take 3.125 mg by mouth two (2) times daily (with meals). 3.125 mg  
    
   
   
   
  
 CYMBALTA 60 mg capsule Generic drug:  DULoxetine Your last dose was: Your next dose is:    
   
   
 take 60 mg by mouth daily. 60 mg  
    
   
   
   
  
 fenofibrate micronized 134 mg capsule Commonly known as:  LOFIBRA Your last dose was: Your next dose is: Take  by mouth every morning. fentaNYL 25 mcg/hr PATCH Commonly known as:  Babara Bernabe Your last dose was: Your next dose is:    
   
   
 1 Patch by TransDERmal route every seventy-two (72) hours. 1 Patch FLONASE 50 mcg/actuation nasal spray Generic drug:  fluticasone Your last dose was: Your next dose is: 2 Sprays by Both Nostrils route daily. 2 Spray  
    
   
   
   
  
 gabapentin 400 mg capsule Commonly known as:  NEURONTIN Your last dose was: Your next dose is: Take 400 mg by mouth three (3) times daily. 400 mg  
    
   
   
   
  
 IMITREX 100 mg tablet Generic drug:  SUMAtriptan Your last dose was: Your next dose is:    
   
   
 take 100 mg by mouth once as needed for Migraine. 100 mg  
    
   
   
   
  
 iron, carbonyl 45 mg Tab Commonly known as:  Rosie Cork Your last dose was: Your next dose is: Take 1 Tab by mouth daily. 1 Tab MIRALAX 17 gram packet Generic drug:  polyethylene glycol Your last dose was: Your next dose is: Take 17 g by mouth daily. 17 g multivitamin tablet Commonly known as:  ONE A DAY Your last dose was: Your next dose is: Take 1 Tab by mouth daily. 1 Tab  
    
   
   
   
  
 nitrofurantoin 100 mg Cap Your last dose was: Your next dose is: Take  by mouth two (2) times a day. nitroglycerin 0.4 mg SL tablet Commonly known as:  NITROSTAT Your last dose was: Your next dose is:    
   
   
 1 Tab by SubLINGual route every five (5) minutes as needed for Chest Pain. 0.4 mg  
    
   
   
   
  
 omeprazole 20 mg capsule Commonly known as:  PRILOSEC Your last dose was: Your next dose is: Take 20 mg by mouth two (2) times a day. 20 mg  
    
   
   
   
  
 oxymetazoline 0.05 % nasal spray Commonly known as:  Carline Goode Your last dose was: Your next dose is: 2 Sprays two (2) times a day. 2 Spray  
    
   
   
   
  
 pravastatin 80 mg tablet Commonly known as:  PRAVACHOL Your last dose was: Your next dose is:    
   
   
 take 80 mg by mouth daily. 80 mg  
    
   
   
   
  
 spironolactone 50 mg tablet Commonly known as:  ALDACTONE Your last dose was: Your next dose is: Take 50 mg by mouth daily. 50 mg  
    
   
   
   
  
 synthroid 50 mcg tablet Generic drug:  levothyroxine Your last dose was: Your next dose is:    
   
   
 take 50 mcg by mouth daily. 50 mcg VITAMIN D3 2,000 unit Tab Generic drug:  cholecalciferol (vitamin D3) Your last dose was: Your next dose is: Take  by mouth. STOP taking these medications   
 digoxin 0.125 mg tablet Commonly known as:  LANOXIN  
   
  
 HYDROcodone-acetaminophen 7.5-325 mg per tablet Commonly known as:  NORCO  
   
  
 LORazepam 1 mg tablet Commonly known as:  ATIVAN  
   
  
 oxyCODONE-acetaminophen  mg per tablet Commonly known as:  PERCOCET ASK your doctor about these medications Instructions Each Dose to Equal  
 Morning Noon Evening Bedtime PLAVIX 75 mg Tab Generic drug:  clopidogrel Your last dose was: Your next dose is:    
   
   
 take 75 mg by mouth daily. 75 mg Where to Get Your Medications Information on where to get these meds will be given to you by the nurse or doctor. ! Ask your nurse or doctor about these medications  
  amiodarone 200 mg tablet  
 apixaban 2.5 mg tablet  
 furosemide 40 mg tablet  
 lisinopril 2.5 mg tablet  
 nitrofurantoin 50 mg capsule

## 2018-03-29 NOTE — ED TRIAGE NOTES
Pt arrives via  via EMS. Pt is in a fib at a rate of 130s. Pt also complains of right leg pain and abdominal pain and chest pain after a fall 3 days ago. Pt's family states pt has been complaining of shortness of breath. Pt's family also states pt has been increasingly weak the past week and slurring her speech more. Other vs: 120/90, , o2 97 RA, pt in a fib.  No fever, no bgl

## 2018-03-30 LAB
ANION GAP SERPL CALC-SCNC: 10 MMOL/L (ref 7–16)
ATRIAL RATE: 267 BPM
BUN SERPL-MCNC: 15 MG/DL (ref 8–23)
CALCIUM SERPL-MCNC: 9.1 MG/DL (ref 8.3–10.4)
CALCULATED P AXIS, ECG09: 130 DEGREES
CALCULATED R AXIS, ECG10: 11 DEGREES
CALCULATED T AXIS, ECG11: 163 DEGREES
CHLORIDE SERPL-SCNC: 102 MMOL/L (ref 98–107)
CO2 SERPL-SCNC: 28 MMOL/L (ref 21–32)
CREAT SERPL-MCNC: 0.53 MG/DL (ref 0.6–1)
DIAGNOSIS, 93000: NORMAL
ERYTHROCYTE [DISTWIDTH] IN BLOOD BY AUTOMATED COUNT: 13.3 % (ref 11.9–14.6)
GLUCOSE SERPL-MCNC: 113 MG/DL (ref 65–100)
HCT VFR BLD AUTO: 35.5 % (ref 35.8–46.3)
HGB BLD-MCNC: 11.8 G/DL (ref 11.7–15.4)
MAGNESIUM SERPL-MCNC: 1.6 MG/DL (ref 1.8–2.4)
MAGNESIUM SERPL-MCNC: 2.2 MG/DL (ref 1.8–2.4)
MCH RBC QN AUTO: 30.3 PG (ref 26.1–32.9)
MCHC RBC AUTO-ENTMCNC: 33.2 G/DL (ref 31.4–35)
MCV RBC AUTO: 91.3 FL (ref 79.6–97.8)
PLATELET # BLD AUTO: 361 K/UL (ref 150–450)
PMV BLD AUTO: 10.4 FL (ref 10.8–14.1)
POTASSIUM SERPL-SCNC: 3.2 MMOL/L (ref 3.5–5.1)
Q-T INTERVAL, ECG07: 408 MS
QRS DURATION, ECG06: 170 MS
QTC CALCULATION (BEZET), ECG08: 467 MS
RBC # BLD AUTO: 3.89 M/UL (ref 4.05–5.25)
SODIUM SERPL-SCNC: 140 MMOL/L (ref 136–145)
TSH SERPL DL<=0.005 MIU/L-ACNC: 0.73 UIU/ML (ref 0.36–3.74)
VENTRICULAR RATE, ECG03: 79 BPM
WBC # BLD AUTO: 7.2 K/UL (ref 4.3–11.1)

## 2018-03-30 PROCEDURE — 83735 ASSAY OF MAGNESIUM: CPT | Performed by: NURSE PRACTITIONER

## 2018-03-30 PROCEDURE — 97166 OT EVAL MOD COMPLEX 45 MIN: CPT

## 2018-03-30 PROCEDURE — 97530 THERAPEUTIC ACTIVITIES: CPT

## 2018-03-30 PROCEDURE — 77010033678 HC OXYGEN DAILY

## 2018-03-30 PROCEDURE — 80048 BASIC METABOLIC PNL TOTAL CA: CPT | Performed by: NURSE PRACTITIONER

## 2018-03-30 PROCEDURE — C8929 TTE W OR WO FOL WCON,DOPPLER: HCPCS

## 2018-03-30 PROCEDURE — 74011000250 HC RX REV CODE- 250: Performed by: EMERGENCY MEDICINE

## 2018-03-30 PROCEDURE — 74011000302 HC RX REV CODE- 302: Performed by: NURSE PRACTITIONER

## 2018-03-30 PROCEDURE — 84443 ASSAY THYROID STIM HORMONE: CPT | Performed by: NURSE PRACTITIONER

## 2018-03-30 PROCEDURE — 74011000250 HC RX REV CODE- 250: Performed by: INTERNAL MEDICINE

## 2018-03-30 PROCEDURE — 85027 COMPLETE CBC AUTOMATED: CPT | Performed by: NURSE PRACTITIONER

## 2018-03-30 PROCEDURE — 36415 COLL VENOUS BLD VENIPUNCTURE: CPT | Performed by: NURSE PRACTITIONER

## 2018-03-30 PROCEDURE — 74011250636 HC RX REV CODE- 250/636: Performed by: INTERNAL MEDICINE

## 2018-03-30 PROCEDURE — 94760 N-INVAS EAR/PLS OXIMETRY 1: CPT

## 2018-03-30 PROCEDURE — 97161 PT EVAL LOW COMPLEX 20 MIN: CPT

## 2018-03-30 PROCEDURE — 86580 TB INTRADERMAL TEST: CPT | Performed by: NURSE PRACTITIONER

## 2018-03-30 PROCEDURE — 77030019605

## 2018-03-30 PROCEDURE — 74011250636 HC RX REV CODE- 250/636: Performed by: NURSE PRACTITIONER

## 2018-03-30 PROCEDURE — 74011000258 HC RX REV CODE- 258: Performed by: EMERGENCY MEDICINE

## 2018-03-30 PROCEDURE — 74011250637 HC RX REV CODE- 250/637: Performed by: INTERNAL MEDICINE

## 2018-03-30 PROCEDURE — 97535 SELF CARE MNGMENT TRAINING: CPT

## 2018-03-30 PROCEDURE — 74011250637 HC RX REV CODE- 250/637: Performed by: NURSE PRACTITIONER

## 2018-03-30 PROCEDURE — 74011250637 HC RX REV CODE- 250/637: Performed by: PHYSICIAN ASSISTANT

## 2018-03-30 PROCEDURE — 65660000000 HC RM CCU STEPDOWN

## 2018-03-30 RX ORDER — GABAPENTIN 400 MG/1
CAPSULE ORAL
COMMUNITY

## 2018-03-30 RX ORDER — LORAZEPAM 0.5 MG/1
0.5 TABLET ORAL
Status: DISCONTINUED | OUTPATIENT
Start: 2018-03-30 | End: 2018-04-05 | Stop reason: HOSPADM

## 2018-03-30 RX ORDER — DIPHENHYDRAMINE HCL 25 MG
25 CAPSULE ORAL
Status: DISCONTINUED | OUTPATIENT
Start: 2018-03-30 | End: 2018-03-30

## 2018-03-30 RX ORDER — HYDROCODONE BITARTRATE AND ACETAMINOPHEN 5; 325 MG/1; MG/1
1 TABLET ORAL
Status: DISCONTINUED | OUTPATIENT
Start: 2018-03-30 | End: 2018-04-05 | Stop reason: HOSPADM

## 2018-03-30 RX ORDER — MAGNESIUM SULFATE HEPTAHYDRATE 40 MG/ML
2 INJECTION, SOLUTION INTRAVENOUS ONCE
Status: COMPLETED | OUTPATIENT
Start: 2018-03-30 | End: 2018-04-02

## 2018-03-30 RX ORDER — DILTIAZEM HYDROCHLORIDE 30 MG/1
30 TABLET, FILM COATED ORAL
Status: DISCONTINUED | OUTPATIENT
Start: 2018-03-30 | End: 2018-03-31

## 2018-03-30 RX ORDER — HYDROCODONE BITARTRATE AND ACETAMINOPHEN 7.5; 325 MG/1; MG/1
1 TABLET ORAL
COMMUNITY
End: 2018-04-05

## 2018-03-30 RX ORDER — DIPHENHYDRAMINE HCL 25 MG
50 CAPSULE ORAL
Status: DISCONTINUED | OUTPATIENT
Start: 2018-03-30 | End: 2018-04-05 | Stop reason: HOSPADM

## 2018-03-30 RX ORDER — POTASSIUM CHLORIDE 20MEQ/15ML
40 LIQUID (ML) ORAL 2 TIMES DAILY
Status: DISPENSED | OUTPATIENT
Start: 2018-03-30 | End: 2018-03-31

## 2018-03-30 RX ORDER — POTASSIUM CHLORIDE 20 MEQ/1
40 TABLET, EXTENDED RELEASE ORAL 2 TIMES DAILY
Status: COMPLETED | OUTPATIENT
Start: 2018-03-30 | End: 2018-03-30

## 2018-03-30 RX ORDER — FUROSEMIDE 40 MG/1
40 TABLET ORAL
Status: DISCONTINUED | OUTPATIENT
Start: 2018-03-30 | End: 2018-04-05 | Stop reason: HOSPADM

## 2018-03-30 RX ORDER — DIPHENHYDRAMINE HCL 25 MG
25 CAPSULE ORAL ONCE
Status: COMPLETED | OUTPATIENT
Start: 2018-03-30 | End: 2018-03-30

## 2018-03-30 RX ORDER — ONDANSETRON 2 MG/ML
4 INJECTION INTRAMUSCULAR; INTRAVENOUS
Status: DISCONTINUED | OUTPATIENT
Start: 2018-03-30 | End: 2018-04-05 | Stop reason: HOSPADM

## 2018-03-30 RX ADMIN — DIPHENHYDRAMINE HYDROCHLORIDE 25 MG: 25 CAPSULE ORAL at 01:04

## 2018-03-30 RX ADMIN — TUBERCULIN PURIFIED PROTEIN DERIVATIVE 5 UNITS: 5 INJECTION, SOLUTION INTRADERMAL at 01:12

## 2018-03-30 RX ADMIN — ASPIRIN 81 MG: 81 TABLET, COATED ORAL at 08:35

## 2018-03-30 RX ADMIN — APIXABAN 5 MG: 5 TABLET, FILM COATED ORAL at 20:53

## 2018-03-30 RX ADMIN — Medication: at 01:04

## 2018-03-30 RX ADMIN — FUROSEMIDE 40 MG: 10 INJECTION, SOLUTION INTRAMUSCULAR; INTRAVENOUS at 08:38

## 2018-03-30 RX ADMIN — SUCRALFATE 1 G: 1 TABLET ORAL at 11:50

## 2018-03-30 RX ADMIN — HYDROCODONE BITARTRATE AND ACETAMINOPHEN 1 TABLET: 5; 325 TABLET ORAL at 02:54

## 2018-03-30 RX ADMIN — LORAZEPAM 0.5 MG: 0.5 TABLET ORAL at 20:53

## 2018-03-30 RX ADMIN — LORAZEPAM 0.5 MG: 0.5 TABLET ORAL at 13:44

## 2018-03-30 RX ADMIN — SUCRALFATE 1 G: 1 TABLET ORAL at 08:40

## 2018-03-30 RX ADMIN — SPIRONOLACTONE 50 MG: 25 TABLET, FILM COATED ORAL at 08:40

## 2018-03-30 RX ADMIN — MAGNESIUM SULFATE HEPTAHYDRATE 2 G: 40 INJECTION, SOLUTION INTRAVENOUS at 06:19

## 2018-03-30 RX ADMIN — APIXABAN 5 MG: 5 TABLET, FILM COATED ORAL at 11:50

## 2018-03-30 RX ADMIN — SUCRALFATE 1 G: 1 TABLET ORAL at 21:02

## 2018-03-30 RX ADMIN — HYDROCODONE BITARTRATE AND ACETAMINOPHEN 1 TABLET: 5; 325 TABLET ORAL at 08:43

## 2018-03-30 RX ADMIN — POTASSIUM CHLORIDE 40 MEQ: 20 TABLET, EXTENDED RELEASE ORAL at 08:42

## 2018-03-30 RX ADMIN — SODIUM CHLORIDE 10 MG/HR: 900 INJECTION, SOLUTION INTRAVENOUS at 06:32

## 2018-03-30 RX ADMIN — PRAVASTATIN SODIUM 80 MG: 80 TABLET ORAL at 08:40

## 2018-03-30 RX ADMIN — PERFLUTREN 1 ML: 6.52 INJECTION, SUSPENSION INTRAVENOUS at 13:00

## 2018-03-30 RX ADMIN — ENOXAPARIN SODIUM 70 MG: 40 INJECTION SUBCUTANEOUS at 00:38

## 2018-03-30 RX ADMIN — FUROSEMIDE 40 MG: 40 TABLET ORAL at 17:45

## 2018-03-30 RX ADMIN — POTASSIUM CHLORIDE 40 MEQ: 20 TABLET, EXTENDED RELEASE ORAL at 17:45

## 2018-03-30 RX ADMIN — DIPHENHYDRAMINE HYDROCHLORIDE 25 MG: 25 CAPSULE ORAL at 02:54

## 2018-03-30 RX ADMIN — CLOPIDOGREL BISULFATE 75 MG: 75 TABLET ORAL at 08:37

## 2018-03-30 RX ADMIN — PANTOPRAZOLE SODIUM 40 MG: 40 TABLET, DELAYED RELEASE ORAL at 08:39

## 2018-03-30 RX ADMIN — LEVOTHYROXINE SODIUM 50 MCG: 50 TABLET ORAL at 08:39

## 2018-03-30 RX ADMIN — CARVEDILOL 6.25 MG: 6.25 TABLET, FILM COATED ORAL at 17:44

## 2018-03-30 RX ADMIN — POLYETHYLENE GLYCOL (3350) 17 G: 17 POWDER, FOR SOLUTION ORAL at 08:42

## 2018-03-30 RX ADMIN — DILTIAZEM HYDROCHLORIDE 30 MG: 30 TABLET, FILM COATED ORAL at 17:45

## 2018-03-30 RX ADMIN — DULOXETINE HYDROCHLORIDE 60 MG: 60 CAPSULE, DELAYED RELEASE ORAL at 08:38

## 2018-03-30 RX ADMIN — CARVEDILOL 6.25 MG: 6.25 TABLET, FILM COATED ORAL at 08:36

## 2018-03-30 RX ADMIN — SUCRALFATE 1 G: 1 TABLET ORAL at 17:45

## 2018-03-30 RX ADMIN — SODIUM CHLORIDE 15 MG/HR: 900 INJECTION, SOLUTION INTRAVENOUS at 03:03

## 2018-03-30 RX ADMIN — FENOFIBRATE 160 MG: 160 TABLET ORAL at 08:38

## 2018-03-30 RX ADMIN — DILTIAZEM HYDROCHLORIDE 30 MG: 30 TABLET, FILM COATED ORAL at 11:50

## 2018-03-30 NOTE — PROGRESS NOTES
Bedside report received from [facility]. Reviewed kardex, mars, results and plan of care. Patient resting comfortably, v/s/s, no c/o pain, sob, n/v/d, no s/s of distress noted at this time. All safety measures in place, call bell within reach, will continue to monitor.

## 2018-03-30 NOTE — ED NOTES
TRANSFER - OUT REPORT:    Verbal report given to Royal Chung RN(name) on Safia Bello  being transferred to [address] [place] [address] (unit) for routine progression of care       Report consisted of patients Situation, Background, Assessment and   Recommendations(SBAR). Information from the following report(s) SBAR, ED Summary and Recent Results was reviewed with the receiving nurse. Lines:   Peripheral IV 03/29/18 Right Antecubital (Active)   Site Assessment Clean, dry, & intact 3/29/2018  6:55 PM   Phlebitis Assessment 0 3/29/2018  6:55 PM   Infiltration Assessment 0 3/29/2018  6:55 PM   Dressing Status Clean, dry, & intact 3/29/2018  6:55 PM       Peripheral IV 03/29/18 Left Antecubital (Active)        Opportunity for questions and clarification was provided.       Nkechi haque transported with:

## 2018-03-30 NOTE — H&P
[facility] Cardiology History & Physical      Date of  Admission: 3/29/2018  6:46 PM     Primary Care Physician: Dr. Tho Fung  Primary Cardiologist: Dr. Kajal Goins  Admitting Physician: Dr. Mike Hoffman    CC: weakness, shortness of breath    HPI:  Ray Deleon is a 80 y.o. female with past medical history of CAD, CHF, depression, HTN, hypothyroidism, and PUD who presented to the ER from  with complaints of weakness and shortness of breath. Patient reports decreased appetite over the past several days. EKG done at urgent care center showed atrial fibrillation with RVR and she was sent to the ER for further evaluation. Upon arrival to the ER, repeat EKG confirmed AFIB with RVR with rate of 130. CXR shows worsening cardiomegaly with pulmonary edema with BNP of 351. While in the ER, she was given 10mg IV Cardizem bolus x 2 followed by drip and 40 mg IV Lasix. After Lasix administration, wayne was placed with good urine output. Past Medical History:   Diagnosis Date    Arthritis     CAD (coronary artery disease)     CHF (congestive heart failure) (HCC)     chronic systolic dysfunction    Depression     Hypertension     Hypothyroidism     Neuropathy     PUD (peptic ulcer disease)       Past Surgical History:   Procedure Laterality Date    CARDIAC SURG PROCEDURE UNLIST      stent x 2    HX APPENDECTOMY      HX CHOLECYSTECTOMY      HX HEART CATHETERIZATION      HX HEENT      HX ORTHOPAEDIC         Allergies   Allergen Reactions    Keflex [Cephalexin] Other (comments)    Pcn [Penicillins] Rash    Sulfa (Sulfonamide Antibiotics) Rash      Social History     Social History    Marital status:      Spouse name: N/A    Number of children: N/A    Years of education: N/A     Occupational History    Not on file.      Social History Main Topics    Smoking status: Never Smoker    Smokeless tobacco: Not on file    Alcohol use No    Drug use: No    Sexual activity: Not on file Other Topics Concern    Not on file     Social History Narrative     Family History   Problem Relation Age of Onset    Hypertension Other         Current Facility-Administered Medications   Medication Dose Route Frequency    dilTIAZem (CARDIZEM) 100 mg in 0.9% sodium chloride (MBP/ADV) 100 mL infusion  0-15 mg/hr IntraVENous TITRATE     Current Outpatient Prescriptions   Medication Sig    cholecalciferol, vitamin D3, (VITAMIN D3) 2,000 unit tab Take  by mouth.  fenofibrate micronized (LOFIBRA) 134 mg capsule Take  by mouth every morning.  fluticasone (FLONASE) 50 mcg/actuation nasal spray 2 Sprays by Both Nostrils route daily.  omeprazole (PRILOSEC) 20 mg capsule Take 20 mg by mouth daily.  oxymetazoline (AFRIN) 0.05 % nasal spray 2 Sprays two (2) times a day.  polyethylene glycol (MIRALAX) 17 gram packet Take 17 g by mouth daily.  sucralfate (CARAFATE) 1 gram tablet Take 1 g by mouth four (4) times daily.  nitroglycerin (NITROSTAT) 0.4 mg SL tablet 1 Tab by SubLINGual route every five (5) minutes as needed for Chest Pain.  fentaNYL (DURAGESIC) 25 mcg/hr PATCH 1 Patch by TransDERmal route every seventy-two (72) hours.  iron, carbonyl (FEOSOL) 45 mg tab Take 1 Tab by mouth daily.  spironolactone (ALDACTONE) 50 mg tablet Take 50 mg by mouth daily.  multivitamin (ONE A DAY) tablet Take 1 Tab by mouth daily.  calcium-cholecalciferol, d3, (CALCIUM 600 + D) 600-125 mg-unit tab Take 500 mg by mouth. Indications: HYPOCALCEMIA    oxyCODONE-acetaminophen (PERCOCET)  mg per tablet Take 1 Tab by mouth every six (6) hours as needed for Pain.  PRAVASTATIN 80 mg tablet take 80 mg by mouth daily.  LORAZEPAM 1 mg tablet take 1 mg by mouth every four (4) hours as needed for Anxiety.  ASPIRIN 325 mg tablet Take 81 mg by mouth daily.  CYMBALTA 60 mg CpDR take 60 mg by mouth daily.  CARVEDILOL 25 mg tablet Take 6.25 mg by mouth two (2) times daily (with meals).     PLAVIX 75 mg Tab take 75 mg by mouth daily.  DIGOXIN 0.125 mg tablet take 125 mcg by mouth daily.  SYNTHROID 50 mcg Tab take 50 mcg by mouth daily.  IMITREX 100 mg Tab take 100 mg by mouth once as needed for Migraine.  NITROFURANTOIN 100 mg Cap Take  by mouth two (2) times a day. Review of Systems    Review of Systems   Constitutional: Positive for malaise/fatigue and weight loss. Respiratory: Positive for shortness of breath. Cardiovascular: Positive for palpitations, orthopnea and leg swelling. Neurological: Positive for weakness. Psychiatric/Behavioral: Positive for depression. All other systems reviewed and are negative. Subjective:     Visit Vitals    BP (!) 130/104    Pulse 88    Temp 98 °F (36.7 °C)    Resp 18    Ht 5' 2\" (1.575 m)    Wt 55.8 kg (123 lb)    SpO2 94%    BMI 22.5 kg/m2     Physical Exam   Neck: JVD present. Cardiovascular: An irregularly irregular rhythm present. Tachycardia present. Pulmonary/Chest: She has rales. Abdominal: Soft. Bowel sounds are normal.   Musculoskeletal: She exhibits edema. Neurological: She is alert. Skin: Skin is warm and dry. Psychiatric: Affect normal.       Cardiographics  Telemetry: AFIB  ECG: atrial fibrillation, rate 130 with chronic LBBB  Echocardiogram: ordered/pending    Labs:   Recent Results (from the past 24 hour(s))   EKG, 12 LEAD, INITIAL    Collection Time: 03/29/18  6:52 PM   Result Value Ref Range    Ventricular Rate 124 BPM    Atrial Rate 129 BPM    QRS Duration 150 ms    Q-T Interval 330 ms    QTC Calculation (Bezet) 474 ms    Calculated R Axis -9 degrees    Calculated T Axis 158 degrees    Diagnosis       !! AGE AND GENDER SPECIFIC ECG ANALYSIS !! Atrial fibrillation vs A. Flutter  with rapid ventricular response  Left bundle branch block  Abnormal ECG  When compared with ECG of 15-APR-2015 19:17,  Atrial fibrillation has replaced Sinus rhythm  Vent.  rate has increased BY  44 BPM  ST elevation now present in Anterior leads  Confirmed by Jayden Yap MD (), BRAXTON ELLIOT ([id number]) on 3/29/2018 9:26:50 PM     DIGOXIN    Collection Time: 03/29/18  6:55 PM   Result Value Ref Range    Digoxin level 0.8 (L) 0.90 - 2.10 NG/ML   POC TROPONIN-I    Collection Time: 03/29/18  6:57 PM   Result Value Ref Range    Troponin-I (POC) 0.02 0.02 - 0.05 ng/ml   POC LACTIC ACID    Collection Time: 03/29/18  6:59 PM   Result Value Ref Range    Lactic Acid (POC) 2.0 (H) 0.5 - 1.9 mmol/L   CBC WITH AUTOMATED DIFF    Collection Time: 03/29/18  7:00 PM   Result Value Ref Range    WBC 7.5 4.3 - 11.1 K/uL    RBC 3.90 (L) 4.05 - 5.25 M/uL    HGB 12.0 11.7 - 15.4 g/dL    HCT 35.9 35.8 - 46.3 %    MCV 92.1 79.6 - 97.8 FL    MCH 30.8 26.1 - 32.9 PG    MCHC 33.4 31.4 - 35.0 g/dL    RDW 13.4 11.9 - 14.6 %    PLATELET 261 919 - 362 K/uL    MPV 10.9 10.8 - 14.1 FL    ABSOLUTE NRBC PENDING K/uL    NEUTROPHILS 68 43 - 78 %    LYMPHOCYTES 21 13 - 44 %    MONOCYTES 9 4.0 - 12.0 %    EOSINOPHILS 2 0.5 - 7.8 %    BASOPHILS 0 0.0 - 2.0 %    IMMATURE GRANULOCYTES 0 0.0 - 5.0 %    ABS. NEUTROPHILS 5.1 1.7 - 8.2 K/UL    ABS. LYMPHOCYTES 1.6 0.5 - 4.6 K/UL    ABS. MONOCYTES 0.7 0.1 - 1.3 K/UL    ABS. EOSINOPHILS 0.1 0.0 - 0.8 K/UL    ABS. BASOPHILS 0.0 0.0 - 0.2 K/UL    DF AUTOMATED      ABS. IMM. GRANS. 0.0 0.0 - 0.5 K/UL   METABOLIC PANEL, COMPREHENSIVE    Collection Time: 03/29/18  7:00 PM   Result Value Ref Range    Sodium 139 136 - 145 mmol/L    Potassium 4.4 3.5 - 5.1 mmol/L    Chloride 105 98 - 107 mmol/L    CO2 26 21 - 32 mmol/L    Anion gap 8 7 - 16 mmol/L    Glucose 109 (H) 65 - 100 mg/dL    BUN 17 8 - 23 MG/DL    Creatinine 0.56 (L) 0.6 - 1.0 MG/DL    GFR est AA >60 >60 ml/min/1.73m2    GFR est non-AA >60 >60 ml/min/1.73m2    Calcium 9.8 8.3 - 10.4 MG/DL    Bilirubin, total 0.5 0.2 - 1.1 MG/DL    ALT (SGPT) 29 12 - 65 U/L    AST (SGOT) 41 (H) 15 - 37 U/L    Alk.  phosphatase 61 50 - 136 U/L    Protein, total 6.9 6.3 - 8.2 g/dL    Albumin 3.6 3.2 - 4.6 g/dL    Globulin 3.3 2.3 - 3.5 g/dL    A-G Ratio 1.1 (L) 1.2 - 3.5     PROCALCITONIN    Collection Time: 03/29/18  7:00 PM   Result Value Ref Range    Procalcitonin <0.1 ng/mL   BNP    Collection Time: 03/29/18  7:00 PM   Result Value Ref Range     pg/mL   MAGNESIUM    Collection Time: 03/29/18  7:00 PM   Result Value Ref Range    Magnesium 1.8 1.8 - 2.4 mg/dL   EKG, 12 LEAD, SUBSEQUENT    Collection Time: 03/29/18  7:31 PM   Result Value Ref Range    Ventricular Rate 100 BPM    Atrial Rate 267 BPM    QRS Duration 156 ms    Q-T Interval 366 ms    QTC Calculation (Bezet) 472 ms    Calculated P Axis 115 degrees    Calculated R Axis -4 degrees    Calculated T Axis 160 degrees    Diagnosis       !! AGE AND GENDER SPECIFIC ECG ANALYSIS !! Atrial flutter with variable A-V block  Left bundle branch block  Abnormal ECG  When compared with ECG of 29-MAR-2018 18:52,  Atrial flutter has replaced Atrial fibrillation  Confirmed by Max Callahan MD (), BRAXTON ZARATE ([id number]) on 3/29/2018 9:27:27 PM         Patient has been seen and examined by Dr. Mady Turner and he agrees with the following assessment and plan:     Assessment/Plan:        Atrial fibrillation with RVR -- admit to telemetry. Continue IV Cardizem drip for rate control. Start SQ Lovenox 1mg/kg BID for anticoagulation. Check magnesium and TSH. HLD (hyperlipidemia) -- continue home dose statin      HTN (hypertension) -- continue home medications. Monitor BP closely. Titrate medications as needed. CAD (coronary artery disease) -- denies chest pain currently or in the recent past. Remote history of PCI. CHF (congestive heart failure) -- last documented EF was 50% in 2013. Given worsening cardiomegaly, this is likely systolic related. Echocardiogram ordered for the AM for confirmation. Currently on Coreg. If EF is decreased <40%, will need to start ACEi/ARB. Continue IV Lasix 40mg BID, strict I/Os with wayne in place and daily weights.        Weakness -- likely multifactorial with AFIB and HF. Will place PPD and consult PT/OT for possible rehab.  Social work has also been consulted as patient lives alone with support from 2 sons            Desmond Cruz NP  3/29/2018 9:28 PM

## 2018-03-30 NOTE — PROGRESS NOTES
IP consult to cardiac rehab for heart failure. Awaiting results of echocardiogram.  Will discuss cardiac rehab program with patient if EF% qualifies for cardiac rehab.

## 2018-03-30 NOTE — ED PROVIDER NOTES
HPI Comments: Patient presents via EMS from [facility]  2 days ago she slipped out of bed and has been progressively more weak and short of breath with elevated heart rate. Patient denies any symptoms now. She lives alone. Son reports that she gets scared and doesn't want to tell us anything. At [facility] she was found to be in  A. Fib with RVR. Family reports that she's never had this before. She has had worsening swelling in her legs. Patient is a 80 y.o. female presenting with fatigue. The history is provided by the patient, the EMS personnel and a relative. The history is limited by the condition of the patient. Fatigue   This is a new problem. The current episode started more than 2 days ago. The problem has been gradually worsening. Primary symptoms include loss of balance and slurred speech. Pertinent negatives include no focal weakness and no disorientation. There has been no fever. Associated symptoms include shortness of breath. Pertinent negatives include no chest pain, no vomiting and no nausea. Past Medical History:   Diagnosis Date    Arthritis     CAD (coronary artery disease)     CHF (congestive heart failure) (HCC)     chronic systolic dysfunction    Depression     Hypertension     Hypothyroidism     Neuropathy     PUD (peptic ulcer disease)        Past Surgical History:   Procedure Laterality Date    CARDIAC SURG PROCEDURE UNLIST      stent x 2    HX APPENDECTOMY      HX CHOLECYSTECTOMY      HX HEART CATHETERIZATION      HX HEENT      HX ORTHOPAEDIC           Family History:   Problem Relation Age of Onset    Hypertension Other        Social History     Social History    Marital status:      Spouse name: N/A    Number of children: N/A    Years of education: N/A     Occupational History    Not on file.      Social History Main Topics    Smoking status: Never Smoker    Smokeless tobacco: Not on file    Alcohol use No    Drug use: No    Sexual activity: Not on file     Other Topics Concern    Not on file     Social History Narrative         ALLERGIES: Keflex [cephalexin]; Pcn [penicillins]; and Sulfa (sulfonamide antibiotics)    Review of Systems   Constitutional: Positive for fatigue. Negative for chills. Respiratory: Positive for shortness of breath. Cardiovascular: Negative for chest pain. Gastrointestinal: Negative for nausea and vomiting. Neurological: Positive for loss of balance. Negative for focal weakness. All other systems reviewed and are negative. Vitals:    03/29/18 1914 03/29/18 1941 03/29/18 2001 03/29/18 2022   BP: (!) 157/113 (!) 146/107 (!) 148/92 (!) 146/92   Pulse: (!) 132 (!) 105 (!) 132 (!) 132   Resp:       Temp:       SpO2:  90% 90%    Weight:       Height:                Physical Exam   Constitutional: She is oriented to person, place, and time. She appears well-developed and well-nourished. No distress. Hard of hearing     HENT:   Head: Normocephalic and atraumatic. Neck: Normal range of motion. Neck supple. Cardiovascular: An irregular rhythm present. Tachycardia present. Pulmonary/Chest: Effort normal. No respiratory distress. She has no wheezes. She has rales. Abdominal: Soft. She exhibits no distension. There is no tenderness. There is no rebound and no guarding. Musculoskeletal: She exhibits edema. Neurological: She is alert and oriented to person, place, and time. No cranial nerve deficit. Skin: Skin is warm and dry. No rash noted. She is not diaphoretic. No erythema. Psychiatric: She has a normal mood and affect. Her behavior is normal.   Nursing note and vitals reviewed. MDM  Number of Diagnoses or Management Options  Acute congestive heart failure, unspecified congestive heart failure type [facility]):   Atrial fibrillation with RVR [facility]):   Diagnosis management comments: Given cardizem 10 with good response. Started on gtt. Went back up into 130s and given another 10 bolus.   Has new onset CHF.  Given lasix. D/w cardiology. Amount and/or Complexity of Data Reviewed  Clinical lab tests: ordered and reviewed  Decide to obtain previous medical records or to obtain history from someone other than the patient: yes  Review and summarize past medical records: yes  Discuss the patient with other providers: yes  Independent visualization of images, tracings, or specimens: yes (LBBB, afib/flutter with rvr.   Repeat EKG with fib, rate controlled)    Risk of Complications, Morbidity, and/or Mortality  Presenting problems: high  Diagnostic procedures: moderate  Management options: high    Patient Progress  Patient progress: improved        ED Course       Procedures

## 2018-03-30 NOTE — PROGRESS NOTES
Care Management Interventions  PCP Verified by CM: Yes  Transition of Care Consult (CM Consult): Discharge Planning  Current Support Network: Lives Alone  Confirm Follow Up Transport: Family  Plan discussed with Pt/Family/Caregiver: Yes  Freedom of Choice Offered: Yes  Met with patient and her son Shabnam Kimball for d/c planning. Patient hard of hearing and request I speak with her son to help with discharge. Prior to admission patient lives alone, was having more difficulty doing her ADL's and ambulating. She has a walker at home to use. She is able to obtain her medications at [address] without difficulty. The son is considering rehab and will see how she participates with PT/OT. Her sons are Krystle Mckinnon [phone] and Bridgette Puga [phone]. Provided list of SNF in the area for them to review. Discussed short term vs long term and coverage by Medicare. Patients son states that they have applied for Medicaid. Discharge plan is possible rehab. CM following.

## 2018-03-30 NOTE — PROGRESS NOTES
Physical Exam   Skin:             Dual skin assessment completed with secondary RN and reveals the following.

## 2018-03-30 NOTE — PROGRESS NOTES
3/30/2018 11:26 AM    Admit Date: 3/29/2018    Admit Diagnosis: Atrial fibrillation with RVR (HCC)      Subjective:   No cp- breathing better      Objective:      Visit Vitals    /57 (BP Patient Position: At rest)    Pulse 65    Temp 97.8 °F (36.6 °C)    Resp 17    Ht 5' 2\" (1.575 m)    Wt 68.3 kg (150 lb 9.2 oz)    SpO2 92%    Breastfeeding No    BMI 27.54 kg/m2       Physical Exam:  [facility], Well Nourished, No Acute Distress, Alert & Oriented x 3, appropriate mood. Neck- supple, no JVD  CV- irregular rate and rhythm no MRG  Lung- clear bilaterally  Abd- soft, nontender, nondistended  Ext- no edema bilaterally. Skin- warm and dry        Data Review:   Recent Labs      03/30/18   0404   NA  140   K  3.2*   BUN  15   CREA  0.53*   WBC  7.2   HGB  11.8   HCT  35.5*   PLT  361       Assessment/Plan:     Principal Problem:    Atrial flutter with RVR ([address] (3/29/2018)- Improved with current therapy. Will continue medications  Discussed options- ablation, marbella/dcc or rate control and anti-coagulation- they choose rate control- start eliquis- dc lovenox and start po cardizem and wean off iv cardizem    Active Problems:    HLD (hyperlipidemia) (9/2/2016)      HTN (hypertension) (9/2/2016)      CAD (coronary artery disease) ()      Acute likely diastolicCHF secondary to a flutter with rvr- Improved with current therapy.  Will continue medications  Replete K

## 2018-03-30 NOTE — PROGRESS NOTES
Bedside and Verbal shift change report given to Watson Acevedo RN (oncoming nurse) by self Denae chacko). Report included the following information SBAR, Kardex, MAR and Recent Results. Cardizem verified with oncoming RN. Hourly vitals printed and placed on chart.

## 2018-03-30 NOTE — PROGRESS NOTES
Problem: Mobility Impaired (Adult and Pediatric)  Goal: *Acute Goals and Plan of Care (Insert Text)  LTG:  (1.)Ms. Nae Jackson will move from supine to sit and sit to supine , scoot up and down and roll side to side in bed with MODIFIED INDEPENDENCE within 7 treatment day(s). (2.)Ms. Nae Jackson will transfer from bed to chair and chair to bed with MODIFIED INDEPENDENCE using the least restrictive device within 7 treatment day(s). (3.)Ms. Nae Jackson will ambulate with SUPERVISION for 250 feet with the least restrictive device within 7 treatment day(s). (4.)Ms. Nae Jackson will participate in therapeutic activity/exerices x 23 minutes for increased strength within 7 days. ________________________________________________________________________________________________      PHYSICAL THERAPY: Initial Assessment, AM 3/30/2018  INPATIENT: Hospital Day: 2  [place] / [place] / Product Type: Medicare /      NAME/AGE/GENDER: Nona Cordon is a 80 y.o. female   PRIMARY DIAGNOSIS: Atrial fibrillation with RVR ([facility]) Atrial fibrillation with RVR (HCC) Atrial fibrillation with RVR ([facility]        ICD-10: Treatment Diagnosis:    · Generalized Muscle Weakness (M62.81)  · Difficulty in walking, Not elsewhere classified (R26.2)  · History of falling (Z91.81)   Precaution/Allergies:  Keflex [cephalexin]; Pcn [penicillins]; and Sulfa (sulfonamide antibiotics)      ASSESSMENT:     Ms. Nae Jackson is a 80 y.o. female in the hospital for the above with a recent history of falling. Pt reports that she lives in a one story house by herself with a ramp to enter. She reported being independent with ADLs and ambulates with rollator. Pt present to PT with generalized weakness in B LEs and decreased ROM in L knee with history of patellar fracture. Pt given Saul for STS and demonstrated fair to poor standing balance. She ambulated with RW around room and has step-to gait pattern.   Ms. Nae Jackson ambulated by leading with L LE and with decreased R step length. Pt also observed to have increased trunk flexion in standing but corrected posture with cuing. Pt appears to be functioning below her baseline and could benefit from skilled PT. This section established at most recent assessment   PROBLEM LIST (Impairments causing functional limitations):  1. Decreased Strength  2. Decreased Transfer Abilities  3. Decreased Ambulation Ability/Technique  4. Decreased Balance   INTERVENTIONS PLANNED: (Benefits and precautions of physical therapy have been discussed with the patient.)  1. Balance Exercise  2. Bed Mobility  3. Family Education  4. Gait Training  5. Therapeutic Activites  6. Therapeutic Exercise/Strengthening  7. Transfer Training  8. Group Therapy     TREATMENT PLAN: Frequency/Duration: 3 times a week for duration of hospital stay  Rehabilitation Potential For Stated Goals: Good     RECOMMENDED REHABILITATION/EQUIPMENT: (at time of discharge pending progress): Due to the probability of continued deficits (see above) this patient will likely need continued skilled physical therapy after discharge. Equipment:    None at this time              HISTORY:   History of Present Injury/Illness (Reason for Referral):  Per H&P: \"   HPI:  Luís Kelley is a 80 y.o. female with past medical history of CAD, CHF, depression, HTN, hypothyroidism, and PUD who presented to the ER from [facility] with complaints of weakness and shortness of breath. Patient reports decreased appetite over the past several days. EKG done at urgent care center showed atrial fibrillation with RVR and she was sent to the ER for further evaluation. Upon arrival to the ER, repeat EKG confirmed AFIB with RVR with rate of 130. CXR shows worsening cardiomegaly with pulmonary edema with BNP of 351. While in the ER, she was given 10mg IV Cardizem bolus x 2 followed by drip and 40 mg IV Lasix. After Lasix administration, wayne was placed with good urine output.  \"  Past Medical History/Comorbidities:   Ms. Mane Saavedra  has a past medical history of Arthritis; CAD (coronary artery disease); CHF (congestive heart failure) ([address] Depression; Hypertension; Hypothyroidism; Neuropathy; and PUD (peptic ulcer disease). Ms. Mane Saavedra  has a past surgical history that includes pr cardiac surg procedure unlist; hx appendectomy; hx cholecystectomy; hx orthopaedic; hx heent; and hx heart catheterization. Social History/Living Environment:   Home Environment: Private residence  # Steps to Enter: 0  Rails to Enter: Yes  Wheelchair Ramp: Yes  One/Two Story Residence: One story  Living Alone: Yes  Support Systems: Child(ulisses)  Patient Expects to be Discharged to[de-identified] Private residence  Current DME Used/Available at Home: [employer]mede[employer] Grave, rollator, Grab bars, Shower chair  Tub or Shower Type: Shower  Prior Level of Function/Work/Activity:  Pt lives alone in a one story house with ramp to enter. Independent with ADLs and uses rollator for ambulation. Number of Personal Factors/Comorbidities that affect the Plan of Care:  fall 3+: HIGH COMPLEXITY   EXAMINATION:   Most Recent Physical Functioning:   Gross Assessment:  AROM: Generally decreased, functional ((L knee))  PROM: Generally decreased, functional (L knee)  Strength: Generally decreased, functional               Posture:     Balance:  Sitting: Intact; With support  Standing: Impaired  Standing - Static: Fair  Standing - Dynamic : Poor Bed Mobility:     Wheelchair Mobility:     Transfers:  Sit to Stand: Minimum assistance  Stand to Sit: Contact guard assistance  Gait:     Speed/Priya: Slow;Shuffled  Step Length: Right shortened  Stance: Left decreased  Gait Abnormalities: Antalgic; Step to gait;Trunk sway increased  Distance (ft): 15 Feet (ft)  Assistive Device: Walker, rolling  Ambulation - Level of Assistance: Minimal assistance      Body Structures Involved:  1. Heart  2. Muscles Body Functions Affected:  1. Cardio  2. Neuromusculoskeletal  3.  Movement Related Activities and Participation Affected:  1. Mobility  2. Domestic Life  3. Community, Social and [place]   Number of elements that affect the Plan of Care: 4+: HIGH COMPLEXITY   CLINICAL PRESENTATION:   Presentation: Stable and uncomplicated: LOW COMPLEXITY   CLINICAL DECISION MAKIN [facility] Mobility Inpatient Short Form  How much difficulty does the patient currently have. .. Unable A Lot A Little None   1. Turning over in bed (including adjusting bedclothes, sheets and blankets)? [] 1   [] 2   [] 3   [x] 4   2. Sitting down on and standing up from a chair with arms ( e.g., wheelchair, bedside commode, etc.)   [] 1   [] 2   [x] 3   [] 4   3. Moving from lying on back to sitting on the side of the bed? [] 1   [] 2   [] 3   [x] 4   How much help from another person does the patient currently need. .. Total A Lot A Little None   4. Moving to and from a bed to a chair (including a wheelchair)? [] 1   [] 2   [x] 3   [] 4   5. Need to walk in hospital room? [] 1   [] 2   [x] 3   [] 4   6. Climbing 3-5 steps with a railing? [] 1   [] 2   [x] 3   [] 4   © , Trustees of [address] [place] [address], under license to [employer]. All rights reserved      Score:  Initial: 20 Most Recent: X (Date: -- )    Interpretation of Tool:  Represents activities that are increasingly more difficult (i.e. Bed mobility, Transfers, Gait). Score 24 23 22-20 19-15 14-10 9-7 6     Modifier CH CI CJ CK CL CM CN      ? Mobility - Walking and Moving Around:     - CURRENT STATUS: CJ - 20%-39% impaired, limited or restricted    - GOAL STATUS: CI - 1%-19% impaired, limited or restricted    - D/C STATUS:  ---------------To be determined---------------  [place] / Product Type: Medicare /      Medical Necessity:     · Patient demonstrates good rehab potential due to higher previous functional level.   Reason for Services/Other Comments:  · Patient continues to require skilled intervention due to decreased functional mobility and balance. Use of outcome tool(s) and clinical judgement create a POC that gives a: Clear prediction of patient's progress: LOW COMPLEXITY            TREATMENT:   (In addition to Assessment/Re-Assessment sessions the following treatments were rendered)   Pre-treatment Symptoms/Complaints:  Anxious  Pain: Initial:   Pain Intensity 1: 0  Post Session:  0     Assessment/Reassessment only, no treatment provided today    Braces/Orthotics/Lines/Etc:   · IV  · O2 Device: Nasal cannula  Treatment/Session Assessment:    · Response to Treatment:  Pt tolerated well but anxious about mobilizing. · Interdisciplinary Collaboration:   o Physical Therapist  o Occupational Therapist  o Registered Nurse  · After treatment position/precautions:   o Up in chair  o Bed alarm/tab alert on  o Bed/Chair-wheels locked  o Call light within reach   · Compliance with Program/Exercises: Will assess as treatment progresses. · Recommendations/Intent for next treatment session: \"Next visit will focus on advancements to more challenging activities and reduction in assistance provided\".   Total Treatment Duration:  PT Patient Time In/Time Out  Time In: 1020  Time Out: [address] [facility] Nasim, PT, DPT

## 2018-03-30 NOTE — PROGRESS NOTES
Problem: Nutrition Deficit  Goal: *Optimize nutritional status  Nutrition  Reason for assessment: Referral received from nursing admission Malnutrition Screening Tool for recently lost \"a few pounds over a few days\" without trying and eating poorly due to decreased appetite. Assessment:   Diet order(s): Cardiac CCHO 1800 jaime, 2L FR; ensure clear TID and ensure enlive TID ordered. Food/Nutrition Patient History:  The patient has a h/o CHF, CAD, HTN and is admitted with SOB and decreased appetite over the past couple of days. The patient reports that her family nearby provides her with meals and she does drink ensure. She reports drinking one ensure per day unless she \"feels weak\" then she will drink two. Per patient, she does not eat salt or fat. Patient is seen eating a cookie and states that she ate 100% of breakfast this morning. Anthropometrics:Height: 5' 2\" (157.5 cm),  Weight: 68.3 kg (150 lb 9.2 oz), Weight Source: Bed, Body mass index is 27.54 kg/(m^2). BMI class of normal weight for age > 72 years. Edema: 2+ pitting to BLEs  WT / BMI 3/30/2018 10/10/2017 4/10/2017   WEIGHT 150 lb 9.2 oz 139 lb 137 lb   Per weights in EMR, potential for an 11 pound weight gain over ~5 months, potentially fluid related. Macronutrient needs:  EER:  6263-9073 kcal /day (20-25 kcal/kg listed BW)  EPR:  50-60 grams protein/day (1-1.2 grams/kg IBW)  Intake/Comparative Standards: Per RD meal rounds: eating a cookie. No recorded meal intake. Nutrition Diagnosis: Inadequate oral intake r/t decreased ability to consume sufficient oral intake as evidenced by patient report of decreased appetite and limited oral intake with intake of ensure for ~1 week PTA. Intervention:  Meals and snacks: Continue current diet but remove CCHO diet restriction. Nutrition Supplement Therapy: d/c ensure clear TID, continue ensure enlive TID   Discharge nutrition plan: Continue ensure as needed.     Leonila Lowe MS, RD, LD, [phone]

## 2018-03-31 LAB
ANION GAP SERPL CALC-SCNC: 9 MMOL/L (ref 7–16)
BUN SERPL-MCNC: 18 MG/DL (ref 8–23)
CALCIUM SERPL-MCNC: 8.9 MG/DL (ref 8.3–10.4)
CHLORIDE SERPL-SCNC: 101 MMOL/L (ref 98–107)
CO2 SERPL-SCNC: 29 MMOL/L (ref 21–32)
CREAT SERPL-MCNC: 0.49 MG/DL (ref 0.6–1)
ERYTHROCYTE [DISTWIDTH] IN BLOOD BY AUTOMATED COUNT: 13.3 % (ref 11.9–14.6)
GLUCOSE SERPL-MCNC: 114 MG/DL (ref 65–100)
HCT VFR BLD AUTO: 35.7 % (ref 35.8–46.3)
HGB BLD-MCNC: 11.8 G/DL (ref 11.7–15.4)
MAGNESIUM SERPL-MCNC: 1.6 MG/DL (ref 1.8–2.4)
MCH RBC QN AUTO: 30.6 PG (ref 26.1–32.9)
MCHC RBC AUTO-ENTMCNC: 33.1 G/DL (ref 31.4–35)
MCV RBC AUTO: 92.7 FL (ref 79.6–97.8)
MM INDURATION POC: 0 MM (ref 0–5)
PLATELET # BLD AUTO: 338 K/UL (ref 150–450)
PMV BLD AUTO: 10.2 FL (ref 10.8–14.1)
POTASSIUM SERPL-SCNC: 4.2 MMOL/L (ref 3.5–5.1)
PPD POC: NEGATIVE NEGATIVE
RBC # BLD AUTO: 3.85 M/UL (ref 4.05–5.25)
SODIUM SERPL-SCNC: 139 MMOL/L (ref 136–145)
WBC # BLD AUTO: 10.1 K/UL (ref 4.3–11.1)

## 2018-03-31 PROCEDURE — 74011250637 HC RX REV CODE- 250/637: Performed by: PHYSICIAN ASSISTANT

## 2018-03-31 PROCEDURE — 74011250637 HC RX REV CODE- 250/637: Performed by: NURSE PRACTITIONER

## 2018-03-31 PROCEDURE — 77010033678 HC OXYGEN DAILY

## 2018-03-31 PROCEDURE — 97530 THERAPEUTIC ACTIVITIES: CPT

## 2018-03-31 PROCEDURE — 83735 ASSAY OF MAGNESIUM: CPT | Performed by: NURSE PRACTITIONER

## 2018-03-31 PROCEDURE — 74011250637 HC RX REV CODE- 250/637: Performed by: INTERNAL MEDICINE

## 2018-03-31 PROCEDURE — 36415 COLL VENOUS BLD VENIPUNCTURE: CPT | Performed by: NURSE PRACTITIONER

## 2018-03-31 PROCEDURE — 74011000250 HC RX REV CODE- 250: Performed by: INTERNAL MEDICINE

## 2018-03-31 PROCEDURE — 80048 BASIC METABOLIC PNL TOTAL CA: CPT | Performed by: NURSE PRACTITIONER

## 2018-03-31 PROCEDURE — 94760 N-INVAS EAR/PLS OXIMETRY 1: CPT

## 2018-03-31 PROCEDURE — 65660000000 HC RM CCU STEPDOWN

## 2018-03-31 PROCEDURE — 85027 COMPLETE CBC AUTOMATED: CPT | Performed by: NURSE PRACTITIONER

## 2018-03-31 RX ORDER — CARVEDILOL 3.12 MG/1
3.12 TABLET ORAL 2 TIMES DAILY WITH MEALS
Status: DISCONTINUED | OUTPATIENT
Start: 2018-03-31 | End: 2018-04-05

## 2018-03-31 RX ORDER — SPIRONOLACTONE 25 MG/1
25 TABLET ORAL DAILY
Status: DISCONTINUED | OUTPATIENT
Start: 2018-03-31 | End: 2018-04-05 | Stop reason: HOSPADM

## 2018-03-31 RX ORDER — DILTIAZEM HYDROCHLORIDE 60 MG/1
60 TABLET, FILM COATED ORAL
Status: DISCONTINUED | OUTPATIENT
Start: 2018-03-31 | End: 2018-03-31

## 2018-03-31 RX ORDER — METOPROLOL TARTRATE 5 MG/5ML
2.5 INJECTION INTRAVENOUS
Status: DISCONTINUED | OUTPATIENT
Start: 2018-03-31 | End: 2018-03-31

## 2018-03-31 RX ORDER — DILTIAZEM HYDROCHLORIDE 60 MG/1
60 TABLET, FILM COATED ORAL EVERY 8 HOURS
Status: DISCONTINUED | OUTPATIENT
Start: 2018-03-31 | End: 2018-03-31

## 2018-03-31 RX ORDER — DILTIAZEM HYDROCHLORIDE 60 MG/1
60 TABLET, FILM COATED ORAL EVERY 8 HOURS
Status: DISCONTINUED | OUTPATIENT
Start: 2018-03-31 | End: 2018-04-01

## 2018-03-31 RX ADMIN — FENOFIBRATE 160 MG: 160 TABLET ORAL at 08:22

## 2018-03-31 RX ADMIN — DILTIAZEM HYDROCHLORIDE 60 MG: 60 TABLET, FILM COATED ORAL at 17:04

## 2018-03-31 RX ADMIN — PRAVASTATIN SODIUM 80 MG: 80 TABLET ORAL at 08:22

## 2018-03-31 RX ADMIN — LORAZEPAM 0.5 MG: 0.5 TABLET ORAL at 21:54

## 2018-03-31 RX ADMIN — PANTOPRAZOLE SODIUM 40 MG: 40 TABLET, DELAYED RELEASE ORAL at 06:29

## 2018-03-31 RX ADMIN — DILTIAZEM HYDROCHLORIDE 60 MG: 60 TABLET, FILM COATED ORAL at 22:04

## 2018-03-31 RX ADMIN — HYDROCODONE BITARTRATE AND ACETAMINOPHEN 1 TABLET: 5; 325 TABLET ORAL at 00:27

## 2018-03-31 RX ADMIN — ASPIRIN 81 MG: 81 TABLET, COATED ORAL at 08:21

## 2018-03-31 RX ADMIN — POLYETHYLENE GLYCOL (3350) 17 G: 17 POWDER, FOR SOLUTION ORAL at 08:31

## 2018-03-31 RX ADMIN — FUROSEMIDE 40 MG: 40 TABLET ORAL at 08:22

## 2018-03-31 RX ADMIN — LORAZEPAM 0.5 MG: 0.5 TABLET ORAL at 02:25

## 2018-03-31 RX ADMIN — LORAZEPAM 0.5 MG: 0.5 TABLET ORAL at 10:47

## 2018-03-31 RX ADMIN — METOROPROLOL TARTRATE 2.5 MG: 5 INJECTION, SOLUTION INTRAVENOUS at 02:26

## 2018-03-31 RX ADMIN — SUCRALFATE 1 G: 1 TABLET ORAL at 21:53

## 2018-03-31 RX ADMIN — SUCRALFATE 1 G: 1 TABLET ORAL at 08:21

## 2018-03-31 RX ADMIN — APIXABAN 5 MG: 5 TABLET, FILM COATED ORAL at 08:22

## 2018-03-31 RX ADMIN — SUCRALFATE 1 G: 1 TABLET ORAL at 12:14

## 2018-03-31 RX ADMIN — FUROSEMIDE 40 MG: 40 TABLET ORAL at 17:04

## 2018-03-31 RX ADMIN — SPIRONOLACTONE 25 MG: 25 TABLET, FILM COATED ORAL at 08:22

## 2018-03-31 RX ADMIN — APIXABAN 5 MG: 5 TABLET, FILM COATED ORAL at 21:53

## 2018-03-31 RX ADMIN — CARVEDILOL 3.12 MG: 3.12 TABLET, FILM COATED ORAL at 08:22

## 2018-03-31 RX ADMIN — HYDROCODONE BITARTRATE AND ACETAMINOPHEN 1 TABLET: 5; 325 TABLET ORAL at 21:53

## 2018-03-31 RX ADMIN — DILTIAZEM HYDROCHLORIDE 60 MG: 60 TABLET, FILM COATED ORAL at 08:38

## 2018-03-31 RX ADMIN — CARVEDILOL 3.12 MG: 3.12 TABLET, FILM COATED ORAL at 17:04

## 2018-03-31 RX ADMIN — SUCRALFATE 1 G: 1 TABLET ORAL at 17:04

## 2018-03-31 RX ADMIN — DULOXETINE HYDROCHLORIDE 60 MG: 60 CAPSULE, DELAYED RELEASE ORAL at 08:22

## 2018-03-31 RX ADMIN — LEVOTHYROXINE SODIUM 50 MCG: 50 TABLET ORAL at 06:29

## 2018-03-31 NOTE — PROGRESS NOTES
[facility] CARDIOLOGY PROGRESS NOTE           3/31/2018 7:43 AM    Admit Date: 3/29/2018    Admit Diagnosis: Atrial fibrillation with RVR (HCC)      Subjective:   No complaints this AM, no chest pain or shortness of breath    Interval History: (History of pertinent interval events obtained from nursing staff)  Remains in aflutter with RVR this AM    ROS:  GEN:  No fever or chills  Cardiovascular:  As noted above  Pulmonary:  As noted above  Neuro:  No new focal motor or sensory loss      Objective:     Vitals:    03/30/18 1800 03/30/18 2045 03/31/18 0048 03/31/18 0514   BP:  94/52 122/76 111/77   Pulse: 96 90 (!) 131 (!) 130   Resp:  18 22 16   Temp:  97.1 °F (36.2 °C) 97.7 °F (36.5 °C) 97.6 °F (36.4 °C)   SpO2:  93% 98% 95%   Weight:    64.6 kg (142 lb 8 oz)   Height:           Physical Exam:  General- frail elderly female, NAD  Neck- supple, no JVD  CV- irreg, tachycardic, distant S1, S2  Lung- decreased BS B/L  Abd- soft, nontender, nondistended  Ext- no edema bilaterally.   Skin- warm and dry    Current Facility-Administered Medications   Medication Dose Route Frequency    metoprolol (LOPRESSOR) injection 2.5 mg  2.5 mg IntraVENous Q6H PRN    HYDROcodone-acetaminophen (NORCO) 5-325 mg per tablet 1 Tab  1 Tab Oral Q4H PRN    ondansetron (ZOFRAN) injection 4 mg  4 mg IntraVENous Q6H PRN    diphenhydrAMINE (BENADRYL) capsule 50 mg  50 mg Oral QHS PRN    potassium chloride (KAON 10%) 20 mEq/15 mL oral liquid 40 mEq  40 mEq Oral BID    dilTIAZem (CARDIZEM) IR tablet 30 mg  30 mg Oral TIDAC    apixaban (ELIQUIS) tablet 5 mg  5 mg Oral BID    furosemide (LASIX) tablet 40 mg  40 mg Oral ACB&D    LORazepam (ATIVAN) tablet 0.5 mg  0.5 mg Oral Q6H PRN    dilTIAZem (CARDIZEM) 100 mg in 0.9% sodium chloride (MBP/ADV) 100 mL infusion  0-15 mg/hr IntraVENous TITRATE    aspirin delayed-release tablet 81 mg  81 mg Oral DAILY    carvedilol (COREG) tablet 6.25 mg  6.25 mg Oral BID WITH MEALS    DULoxetine (CYMBALTA) capsule 60 mg  60 mg Oral DAILY    fenofibrate (LOFIBRA) tablet 160 mg  160 mg Oral DAILY    pantoprazole (PROTONIX) tablet 40 mg  40 mg Oral ACB    polyethylene glycol (MIRALAX) packet 17 g  17 g Oral DAILY    pravastatin (PRAVACHOL) tablet 80 mg  80 mg Oral DAILY    spironolactone (ALDACTONE) tablet 50 mg  50 mg Oral DAILY    sucralfate (CARAFATE) tablet 1 g  1 g Oral AC&HS    levothyroxine (SYNTHROID) tablet 50 mcg  50 mcg Oral ACB    NUTRITIONAL SUPPORT ELECTROLYTE PRN ORDERS   Does Not Apply PRN    lip protectant (BLISTEX) ointment   Topical PRN     Data Review:   Recent Results (from the past 24 hour(s))   MAGNESIUM    Collection Time: 03/30/18 10:06 AM   Result Value Ref Range    Magnesium 2.2 1.8 - 2.4 mg/dL   METABOLIC PANEL, BASIC    Collection Time: 03/31/18  4:10 AM   Result Value Ref Range    Sodium 139 136 - 145 mmol/L    Potassium 4.2 3.5 - 5.1 mmol/L    Chloride 101 98 - 107 mmol/L    CO2 29 21 - 32 mmol/L    Anion gap 9 7 - 16 mmol/L    Glucose 114 (H) 65 - 100 mg/dL    BUN 18 8 - 23 MG/DL    Creatinine 0.49 (L) 0.6 - 1.0 MG/DL    GFR est AA >60 >60 ml/min/1.73m2    GFR est non-AA >60 >60 ml/min/1.73m2    Calcium 8.9 8.3 - 10.4 MG/DL   MAGNESIUM    Collection Time: 03/31/18  4:10 AM   Result Value Ref Range    Magnesium 1.6 (L) 1.8 - 2.4 mg/dL   CBC W/O DIFF    Collection Time: 03/31/18  4:10 AM   Result Value Ref Range    WBC 10.1 4.3 - 11.1 K/uL    RBC 3.85 (L) 4.05 - 5.25 M/uL    HGB 11.8 11.7 - 15.4 g/dL    HCT 35.7 (L) 35.8 - 46.3 %    MCV 92.7 79.6 - 97.8 FL    MCH 30.6 26.1 - 32.9 PG    MCHC 33.1 31.4 - 35.0 g/dL    RDW 13.3 11.9 - 14.6 %    PLATELET 636 919 - 092 K/uL    MPV 10.2 (L) 10.8 - 14.1 FL       EKG:  (EKG has been independently visualized by me with interpretation below) atypical atrial flutter vs atrial tachycardia, LBBB  Assessment:     Principal Problem:    Atrial fibrillation with RVR ([address] (3/29/2018)    Active Problems:    HLD (hyperlipidemia) (9/2/2016)      HTN (hypertension) (9/2/2016)      CAD (coronary artery disease) ()      CHF (congestive heart failure) ([facility] (3/29/2018)      Plan:   1. Afib/flutter, uncontrolled: Dr. Shravan Ratliff has discussed this admission with family management options and plan at this point is rate control. Will increase diltiazem to 60mg Q8H today, decrease coreg to 3.125 and decreased spironolactone to 25mg to allow for higher blood pressures to allow for higher doses of rate control meds, cont eliquis   2. CHF, uncontrolled: Pt last echo with severe LV dysfunction, excellent diuresis over last 24 hours, cont current meds  3. Mod to sev MR, uncontrolled: complicated arrhythmia and CHF, very limited options, with age, severe systolic heart failure, and valvular disease tends toward poor prognosis overall  4. HTN: blood pressures low  5. CAD: no active chest pain  6. PPx: tana Haynes MD  Cardiology/Electrophysiology

## 2018-03-31 NOTE — PROGRESS NOTES
Problem: Mobility Impaired (Adult and Pediatric)  Goal: *Acute Goals and Plan of Care (Insert Text)  LTG:  (1.)Ms. Jeri Cortez will move from supine to sit and sit to supine , scoot up and down and roll side to side in bed with MODIFIED INDEPENDENCE within 7 treatment day(s). (2.)Ms. Jeri Cortez will transfer from bed to chair and chair to bed with MODIFIED INDEPENDENCE using the least restrictive device within 7 treatment day(s). (3.)Ms. Jeri Cortez will ambulate with SUPERVISION for 250 feet with the least restrictive device within 7 treatment day(s). (4.)Ms. Jeri Cortez will participate in therapeutic activity/exerices x 23 minutes for increased strength within 7 days. ________________________________________________________________________________________________      PHYSICAL THERAPY: Daily Note, PM 3/31/2018  INPATIENT: Hospital Day: 3  [place] / Product Type: Medicare /      NAME/AGE/GENDER: Ottoniel Menchaca is a 80 y.o. female   PRIMARY DIAGNOSIS: Atrial fibrillation with RVR ([facility]) Atrial fibrillation with RVR (HCC) Atrial fibrillation with RVR ([facility] [address]        ICD-10: Treatment Diagnosis:    · Generalized Muscle Weakness (M62.81)  · Difficulty in walking, Not elsewhere classified (R26.2)  · History of falling (Z91.81)   Precaution/Allergies:  Keflex [cephalexin]; Pcn [penicillins]; and Sulfa (sulfonamide antibiotics)      ASSESSMENT:     Ms. Jeri Cortez is a 80 y.o. female in the hospital for the above with a recent history of falling. Pt reports that she lives in a one story house by herself with a ramp to enter. She reported being independent with ADLs and ambulates with rollator. Pt present to PT with generalized weakness in B LEs and decreased ROM in L knee with history of patellar fracture. Pt given CGA x 1  for STS and demonstrated fair standing balance. She ambulated with RW for 50 feet in room and santillan with CGA x 1.   Ms. Wilcox Casselman ambulated by leading with MIRACLE OBANDO and with decreased R step length. Pt also observed to have increased trunk flexion in standing but corrected posture with cuing. Her 02 nasal cannula has be d/c'd secondary to 02 saturation improvements. Pt appears to be functioning below her baseline and could benefit from skilled PT. This section established at most recent assessment   PROBLEM LIST (Impairments causing functional limitations):  1. Decreased Strength  2. Decreased Transfer Abilities  3. Decreased Ambulation Ability/Technique  4. Decreased Balance   INTERVENTIONS PLANNED: (Benefits and precautions of physical therapy have been discussed with the patient.)  1. Balance Exercise  2. Bed Mobility  3. Family Education  4. Gait Training  5. Therapeutic Activites  6. Therapeutic Exercise/Strengthening  7. Transfer Training  8. Group Therapy     TREATMENT PLAN: Frequency/Duration: 3 times a week for duration of hospital stay  Rehabilitation Potential For Stated Goals: Good     RECOMMENDED REHABILITATION/EQUIPMENT: (at time of discharge pending progress): Due to the probability of continued deficits (see above) this patient will likely need continued skilled physical therapy after discharge. Equipment:    None at this time              HISTORY:   History of Present Injury/Illness (Reason for Referral):  Per H&P: \"   HPI:  Magdalena Hurtado is a 80 y.o. female with past medical history of CAD, CHF, depression, HTN, hypothyroidism, and PUD who presented to the ER from [facility] with complaints of weakness and shortness of breath. Patient reports decreased appetite over the past several days. EKG done at urgent care center showed atrial fibrillation with RVR and she was sent to the ER for further evaluation. Upon arrival to the ER, repeat EKG confirmed AFIB with RVR with rate of 130. CXR shows worsening cardiomegaly with pulmonary edema with BNP of 351. While in the ER, she was given 10mg IV Cardizem bolus x 2 followed by drip and 40 mg IV Lasix.  After Lasix administration, wayne was placed with good urine output. \"  Past Medical History/Comorbidities:   Ms. Rubin Valente  has a past medical history of Arthritis; CAD (coronary artery disease); CHF (congestive heart failure) ([address] Depression; Hypertension; Hypothyroidism; Neuropathy; and PUD (peptic ulcer disease). Ms. Rubin Valente  has a past surgical history that includes pr cardiac surg procedure unlist; hx appendectomy; hx cholecystectomy; hx orthopaedic; hx heent; and hx heart catheterization. Social History/Living Environment:   Home Environment: Private residence  # Steps to Enter: 0  Rails to Enter: Yes  Wheelchair Ramp: Yes  One/Two Story Residence: One story  Living Alone: Yes  Support Systems: Child(ulisses)  Patient Expects to be Discharged to[de-identified] Private residence  Current DME Used/Available at Home: Jase [place], rollator, Grab bars, Shower chair  Tub or Shower Type: Shower  Prior Level of Function/Work/Activity:  Pt lives alone in a one story house with ramp to enter. Independent with ADLs and uses rollator for ambulation. Number of Personal Factors/Comorbidities that affect the Plan of Care:  fall 3+: HIGH COMPLEXITY   EXAMINATION:   Most Recent Physical Functioning:   Gross Assessment:                  Posture:     Balance:  Sitting: Intact; With support  Sitting - Static: Fair (occasional)  Sitting - Dynamic: Fair (occasional)  Standing: Impaired  Standing - Static: Fair  Standing - Dynamic : Poor Bed Mobility:  Rolling: Contact guard assistance  Supine to Sit: Contact guard assistance  Sit to Supine: Contact guard assistance  Scooting: Contact guard assistance  Wheelchair Mobility:     Transfers:  Sit to Stand: Contact guard assistance  Stand to Sit: Contact guard assistance  Bed to Chair: Contact guard assistance  Gait:     Speed/Priya: Fluctuations; Pace decreased (<100 feet/min); Shuffled; Slow  Step Length: Left shortened;Right shortened  Stance: Left decreased;Right decreased;Time;Weight shift  Gait Abnormalities: Antalgic; Shuffling gait; Steppage gait; Step to gait;Trunk sway increased  Distance (ft): 50 Feet (ft)  Assistive Device: Walker, rolling  Ambulation - Level of Assistance: Contact guard assistance      Body Structures Involved:  1. Heart  2. Muscles Body Functions Affected:  1. Cardio  2. Neuromusculoskeletal  3. Movement Related Activities and Participation Affected:  1. Mobility  2. Domestic Life  3. Community, Social and [employer] [place]   Number of elements that affect the Plan of Care: 4+: HIGH COMPLEXITY   CLINICAL PRESENTATION:   Presentation: Stable and uncomplicated: LOW COMPLEXITY   CLINICAL DECISION MAKIN [facility] Mobility Inpatient Short Form  How much difficulty does the patient currently have. .. Unable A Lot A Little None   1. Turning over in bed (including adjusting bedclothes, sheets and blankets)? [] 1   [] 2   [] 3   [x] 4   2. Sitting down on and standing up from a chair with arms ( e.g., wheelchair, bedside commode, etc.)   [] 1   [] 2   [x] 3   [] 4   3. Moving from lying on back to sitting on the side of the bed? [] 1   [] 2   [] 3   [x] 4   How much help from another person does the patient currently need. .. Total A Lot A Little None   4. Moving to and from a bed to a chair (including a wheelchair)? [] 1   [] 2   [x] 3   [] 4   5. Need to walk in hospital room? [] 1   [] 2   [x] 3   [] 4   6. Climbing 3-5 steps with a railing? [] 1   [] 2   [x] 3   [] 4   © , Trustees of [address] [place] [address] [address] [address], under license to [employer]. All rights reserved      Score:  Initial: 20 Most Recent: X (Date: -- )    Interpretation of Tool:  Represents activities that are increasingly more difficult (i.e. Bed mobility, Transfers, Gait). Score 24 23 22-20 19-15 14-10 9-7 6     Modifier CH CI CJ CK CL CM CN      ?  Mobility - Walking and Moving Around:     - CURRENT STATUS: CJ - 20%-39% impaired, limited or restricted    - GOAL STATUS: CI - 1%-19% impaired, limited or restricted    - D/C STATUS:  ---------------To be determined---------------  [place] / [place] / Product Type: Medicare /      Medical Necessity:     · Patient demonstrates good rehab potential due to higher previous functional level. Reason for Services/Other Comments:  · Patient continues to require skilled intervention due to decreased functional mobility and balance. Use of outcome tool(s) and clinical judgement create a POC that gives a: Clear prediction of patient's progress: LOW COMPLEXITY            TREATMENT:   (In addition to Assessment/Re-Assessment sessions the following treatments were rendered)   Pre-treatment Symptoms/Complaints:  Patient is very talkative and performs all therapeutic activity slowly. Pain: Initial:   Pain Intensity 1: 0  Post Session:  0     Therapeutic Activity: (    38 mins): Therapeutic activities including Bed transfers, Chair transfers and Ambulation on level ground to improve mobility, strength, balance, coordination and dynamic movement of leg - bilateral to improve functional dynamic mobility. Required minimal   to promote motor control of bilateral, lower extremity(s). Date:   Date:   Date:     Activity/Exercise Parameters Parameters Parameters                                               Braces/Orthotics/Lines/Etc:   · IV  · O2 Device: Nasal cannula  Treatment/Session Assessment:    · Response to Treatment:  Pt tolerated well but anxious about mobilizing. · Interdisciplinary Collaboration:   o Physical Therapist  o Registered Nurse  · After treatment position/precautions:   o Supine in bed  o Bed alarm/tab alert on  o Bed/Chair-wheels locked  o Bed in low position  o Call light within reach   · Compliance with Program/Exercises: Will assess as treatment progresses. · Recommendations/Intent for next treatment session:   \"Next visit will focus on advancements to more challenging activities and reduction in assistance provided\".   Total Treatment Duration:  PT Patient Time In/Time Out  Time In: [address]  Time Out: [address] [place], [place]

## 2018-03-31 NOTE — PROGRESS NOTES
Verbal bedside report given to Christianne Leventhal., oncoming RN. Patient's situation, background, assessment and recommendations provided. Opportunity for questions provided. Oncoming RN assumed care of patient.

## 2018-03-31 NOTE — PROGRESS NOTES
Bedside report given to JEREL Aviles. Reviewed kardex, mars, results and plan of care. Patient resting comfortably, family at bedside. v/s/s, no c/o pain, sob, n/v/d, no s/s of distress noted at this time. All safety measures in place, call bell within reach.

## 2018-03-31 NOTE — PROGRESS NOTES
Verbal bedside report received from Blas Sears, [facility]. Assumed care of patient.

## 2018-04-01 LAB
ANION GAP SERPL CALC-SCNC: 7 MMOL/L (ref 7–16)
BUN SERPL-MCNC: 17 MG/DL (ref 8–23)
CALCIUM SERPL-MCNC: 9.2 MG/DL (ref 8.3–10.4)
CHLORIDE SERPL-SCNC: 97 MMOL/L (ref 98–107)
CO2 SERPL-SCNC: 31 MMOL/L (ref 21–32)
CREAT SERPL-MCNC: 0.42 MG/DL (ref 0.6–1)
ERYTHROCYTE [DISTWIDTH] IN BLOOD BY AUTOMATED COUNT: 13.4 % (ref 11.9–14.6)
GLUCOSE SERPL-MCNC: 93 MG/DL (ref 65–100)
HCT VFR BLD AUTO: 34.3 % (ref 35.8–46.3)
HGB BLD-MCNC: 11.2 G/DL (ref 11.7–15.4)
MAGNESIUM SERPL-MCNC: 1.5 MG/DL (ref 1.8–2.4)
MCH RBC QN AUTO: 30.3 PG (ref 26.1–32.9)
MCHC RBC AUTO-ENTMCNC: 32.7 G/DL (ref 31.4–35)
MCV RBC AUTO: 92.7 FL (ref 79.6–97.8)
MM INDURATION POC: 0 MM (ref 0–5)
PLATELET # BLD AUTO: 329 K/UL (ref 150–450)
PMV BLD AUTO: 10.1 FL (ref 10.8–14.1)
POTASSIUM SERPL-SCNC: 3.9 MMOL/L (ref 3.5–5.1)
PPD POC: NEGATIVE NEGATIVE
RBC # BLD AUTO: 3.7 M/UL (ref 4.05–5.25)
SODIUM SERPL-SCNC: 135 MMOL/L (ref 136–145)
WBC # BLD AUTO: 8.6 K/UL (ref 4.3–11.1)

## 2018-04-01 PROCEDURE — 83735 ASSAY OF MAGNESIUM: CPT | Performed by: NURSE PRACTITIONER

## 2018-04-01 PROCEDURE — 74011250637 HC RX REV CODE- 250/637: Performed by: INTERNAL MEDICINE

## 2018-04-01 PROCEDURE — 80048 BASIC METABOLIC PNL TOTAL CA: CPT | Performed by: NURSE PRACTITIONER

## 2018-04-01 PROCEDURE — 74011250637 HC RX REV CODE- 250/637: Performed by: PHYSICIAN ASSISTANT

## 2018-04-01 PROCEDURE — 74011250637 HC RX REV CODE- 250/637: Performed by: NURSE PRACTITIONER

## 2018-04-01 PROCEDURE — 85027 COMPLETE CBC AUTOMATED: CPT | Performed by: NURSE PRACTITIONER

## 2018-04-01 PROCEDURE — 36415 COLL VENOUS BLD VENIPUNCTURE: CPT | Performed by: NURSE PRACTITIONER

## 2018-04-01 PROCEDURE — 65660000000 HC RM CCU STEPDOWN

## 2018-04-01 RX ORDER — DILTIAZEM HYDROCHLORIDE 180 MG/1
180 CAPSULE, COATED, EXTENDED RELEASE ORAL DAILY
Status: DISCONTINUED | OUTPATIENT
Start: 2018-04-01 | End: 2018-04-02

## 2018-04-01 RX ADMIN — SUCRALFATE 1 G: 1 TABLET ORAL at 12:23

## 2018-04-01 RX ADMIN — SUCRALFATE 1 G: 1 TABLET ORAL at 17:22

## 2018-04-01 RX ADMIN — POLYETHYLENE GLYCOL (3350) 17 G: 17 POWDER, FOR SOLUTION ORAL at 09:55

## 2018-04-01 RX ADMIN — FUROSEMIDE 40 MG: 40 TABLET ORAL at 17:22

## 2018-04-01 RX ADMIN — HYDROCODONE BITARTRATE AND ACETAMINOPHEN 1 TABLET: 5; 325 TABLET ORAL at 23:56

## 2018-04-01 RX ADMIN — FUROSEMIDE 40 MG: 40 TABLET ORAL at 05:53

## 2018-04-01 RX ADMIN — SPIRONOLACTONE 25 MG: 25 TABLET, FILM COATED ORAL at 09:57

## 2018-04-01 RX ADMIN — SUCRALFATE 1 G: 1 TABLET ORAL at 21:36

## 2018-04-01 RX ADMIN — CARVEDILOL 3.12 MG: 3.12 TABLET, FILM COATED ORAL at 09:57

## 2018-04-01 RX ADMIN — PANTOPRAZOLE SODIUM 40 MG: 40 TABLET, DELAYED RELEASE ORAL at 05:52

## 2018-04-01 RX ADMIN — DULOXETINE HYDROCHLORIDE 60 MG: 60 CAPSULE, DELAYED RELEASE ORAL at 09:58

## 2018-04-01 RX ADMIN — PRAVASTATIN SODIUM 80 MG: 80 TABLET ORAL at 09:58

## 2018-04-01 RX ADMIN — DILTIAZEM HYDROCHLORIDE 60 MG: 60 TABLET, FILM COATED ORAL at 05:53

## 2018-04-01 RX ADMIN — APIXABAN 5 MG: 5 TABLET, FILM COATED ORAL at 09:58

## 2018-04-01 RX ADMIN — DIPHENHYDRAMINE HYDROCHLORIDE 50 MG: 25 CAPSULE ORAL at 23:56

## 2018-04-01 RX ADMIN — SUCRALFATE 1 G: 1 TABLET ORAL at 09:57

## 2018-04-01 RX ADMIN — DILTIAZEM HYDROCHLORIDE 180 MG: 180 CAPSULE, COATED, EXTENDED RELEASE ORAL at 09:57

## 2018-04-01 RX ADMIN — ASPIRIN 81 MG: 81 TABLET, COATED ORAL at 09:58

## 2018-04-01 RX ADMIN — CARVEDILOL 3.12 MG: 3.12 TABLET, FILM COATED ORAL at 17:22

## 2018-04-01 RX ADMIN — APIXABAN 5 MG: 5 TABLET, FILM COATED ORAL at 21:36

## 2018-04-01 RX ADMIN — LORAZEPAM 0.5 MG: 0.5 TABLET ORAL at 21:36

## 2018-04-01 RX ADMIN — LORAZEPAM 0.5 MG: 0.5 TABLET ORAL at 15:30

## 2018-04-01 RX ADMIN — FENOFIBRATE 160 MG: 160 TABLET ORAL at 09:57

## 2018-04-01 RX ADMIN — LEVOTHYROXINE SODIUM 50 MCG: 50 TABLET ORAL at 05:50

## 2018-04-01 NOTE — PROGRESS NOTES
Bedside and Verbal shift change report given to self (oncoming nurse) by Tonie Ogden (offgoing nurse). Report included the following information SBAR, Kardex, MAR and Recent Results.

## 2018-04-01 NOTE — PROGRESS NOTES
Bedside and Verbal shift change report received from Yoko Kauffman RN. Report included the following information SBAR, Kardex, Procedure Summary, Intake/Output, MAR, Recent Results and Cardiac Rhythm A-Fib.

## 2018-04-01 NOTE — PROGRESS NOTES
Verbal bedside report given to Belle Lopez oncoming RN. Patient's situation, background, assessment and recommendations provided. Opportunity for questions provided. Oncoming RN assumed care of patient.

## 2018-04-01 NOTE — PROGRESS NOTES
Shift change, with bedside reporting completed. Reinforced Safety precautions: Call for assistance prior to activity, and use of nonskid socks before getting out of bed (OOB). Verbalized understanding of safety instructions. Hand held call-light within reach. Bed alarm on and functioning as intended.

## 2018-04-01 NOTE — PROGRESS NOTES
Ativan 0.5 mg tablet given per patient request, \"need Lorazepam pill for nerves and Anxiety, take nerve pill daily. .. Lost a baby, my son lost his leg, lost my  10 years ago, my nerves are bad, have to have my nerve pill. \"

## 2018-04-01 NOTE — PROGRESS NOTES
[facility] CARDIOLOGY PROGRESS NOTE           4/1/2018 7:44 AM    Admit Date: 3/29/2018    Admit Diagnosis: Atrial fibrillation with RVR (HCC)      Subjective:   No complaints this AM, no chest pain or shortness of breath    Interval History: (History of pertinent interval events obtained from nursing staff)    ROS:  GEN:  No fever or chills  Cardiovascular:  As noted above  Pulmonary:  As noted above  Neuro:  No new focal motor or sensory loss      Objective:     Vitals:    03/31/18 2044 03/31/18 2204 04/01/18 0109 04/01/18 0425   BP: 116/44 110/65 102/49 100/65   Pulse: 98 (!) 105 98 92   Resp: 18 17 18   Temp: 98.5 °F (36.9 °C)  98 °F (36.7 °C) 97.3 °F (36.3 °C)   SpO2: 95%  95% 93%   Weight:    62.6 kg (138 lb 1.6 oz)   Height:           Physical Exam:  General- frail elderly female, NAD  Neck- supple, no JVD  CV- irreg, tachycardic, distant S1, S2  Lung- decreased BS B/L  Abd- soft, nontender, nondistended  Ext- no edema bilaterally.   Skin- warm and dry    Current Facility-Administered Medications   Medication Dose Route Frequency    carvedilol (COREG) tablet 3.125 mg  3.125 mg Oral BID WITH MEALS    spironolactone (ALDACTONE) tablet 25 mg  25 mg Oral DAILY    dilTIAZem (CARDIZEM) IR tablet 60 mg  60 mg Oral Q8H    HYDROcodone-acetaminophen (NORCO) 5-325 mg per tablet 1 Tab  1 Tab Oral Q4H PRN    ondansetron (ZOFRAN) injection 4 mg  4 mg IntraVENous Q6H PRN    diphenhydrAMINE (BENADRYL) capsule 50 mg  50 mg Oral QHS PRN    apixaban (ELIQUIS) tablet 5 mg  5 mg Oral BID    furosemide (LASIX) tablet 40 mg  40 mg Oral ACB&D    LORazepam (ATIVAN) tablet 0.5 mg  0.5 mg Oral Q6H PRN    aspirin delayed-release tablet 81 mg  81 mg Oral DAILY    DULoxetine (CYMBALTA) capsule 60 mg  60 mg Oral DAILY    fenofibrate (LOFIBRA) tablet 160 mg  160 mg Oral DAILY    pantoprazole (PROTONIX) tablet 40 mg  40 mg Oral ACB    polyethylene glycol (MIRALAX) packet 17 g  17 g Oral DAILY    pravastatin (PRAVACHOL) tablet 80 mg  80 mg Oral DAILY    sucralfate (CARAFATE) tablet 1 g  1 g Oral AC&HS    levothyroxine (SYNTHROID) tablet 50 mcg  50 mcg Oral ACB    NUTRITIONAL SUPPORT ELECTROLYTE PRN ORDERS   Does Not Apply PRN    lip protectant (BLISTEX) ointment   Topical PRN     Data Review:   Recent Results (from the past 24 hour(s))   PLEASE READ & DOCUMENT PPD TEST IN 48 HRS    Collection Time: 04/01/18 12:00 AM   Result Value Ref Range    PPD negative Negative    mm Induration 0 mm   METABOLIC PANEL, BASIC    Collection Time: 04/01/18  4:23 AM   Result Value Ref Range    Sodium 135 (L) 136 - 145 mmol/L    Potassium 3.9 3.5 - 5.1 mmol/L    Chloride 97 (L) 98 - 107 mmol/L    CO2 31 21 - 32 mmol/L    Anion gap 7 7 - 16 mmol/L    Glucose 93 65 - 100 mg/dL    BUN 17 8 - 23 MG/DL    Creatinine 0.42 (L) 0.6 - 1.0 MG/DL    GFR est AA >60 >60 ml/min/1.73m2    GFR est non-AA >60 >60 ml/min/1.73m2    Calcium 9.2 8.3 - 10.4 MG/DL   MAGNESIUM    Collection Time: 04/01/18  4:23 AM   Result Value Ref Range    Magnesium 1.5 (L) 1.8 - 2.4 mg/dL   CBC W/O DIFF    Collection Time: 04/01/18  4:23 AM   Result Value Ref Range    WBC 8.6 4.3 - 11.1 K/uL    RBC 3.70 (L) 4.05 - 5.25 M/uL    HGB 11.2 (L) 11.7 - 15.4 g/dL    HCT 34.3 (L) 35.8 - 46.3 %    MCV 92.7 79.6 - 97.8 FL    MCH 30.3 26.1 - 32.9 PG    MCHC 32.7 31.4 - 35.0 g/dL    RDW 13.4 11.9 - 14.6 %    PLATELET 083 596 - 789 K/uL    MPV 10.1 (L) 10.8 - 14.1 FL       EKG:  (EKG has been independently visualized by me with interpretation below)  Assessment:     Principal Problem:    Atrial fibrillation with RVR ([address] (3/29/2018)    Active Problems:    HLD (hyperlipidemia) (9/2/2016)      HTN (hypertension) (9/2/2016)      CAD (coronary artery disease) ()      CHF (congestive heart failure) ([facility] (3/29/2018)      Plan:   1. Afib/flutter, uncontrolled: Dr. Henny Bull has discussed this admission with family management options and plan at this point is rate control.  Oskar transition to long acting diltiazem today, cont eliquis   2. CHF, uncontrolled: Pt last echo with severe LV dysfunction, excellent diuresis over last 24 hours, now on oral lasix with continued diuresis  3. Mod to sev MR, uncontrolled: complicated arrhythmia and CHF, very limited options, with age, severe systolic heart failure, and valvular disease tends toward poor prognosis overall  4. HTN: blood pressures low  5. CAD: no active chest pain  6. PPx: tana Haynes MD  Cardiology/Electrophysiology

## 2018-04-02 LAB
ANION GAP SERPL CALC-SCNC: 10 MMOL/L (ref 7–16)
BUN SERPL-MCNC: 15 MG/DL (ref 8–23)
CALCIUM SERPL-MCNC: 9.5 MG/DL (ref 8.3–10.4)
CHLORIDE SERPL-SCNC: 94 MMOL/L (ref 98–107)
CO2 SERPL-SCNC: 30 MMOL/L (ref 21–32)
CREAT SERPL-MCNC: 0.57 MG/DL (ref 0.6–1)
GLUCOSE SERPL-MCNC: 88 MG/DL (ref 65–100)
MAGNESIUM SERPL-MCNC: 1.6 MG/DL (ref 1.8–2.4)
MM INDURATION POC: 0 MM (ref 0–5)
POTASSIUM SERPL-SCNC: 3.8 MMOL/L (ref 3.5–5.1)
PPD POC: NEGATIVE NEGATIVE
SODIUM SERPL-SCNC: 134 MMOL/L (ref 136–145)

## 2018-04-02 PROCEDURE — 80048 BASIC METABOLIC PNL TOTAL CA: CPT | Performed by: NURSE PRACTITIONER

## 2018-04-02 PROCEDURE — 36415 COLL VENOUS BLD VENIPUNCTURE: CPT | Performed by: NURSE PRACTITIONER

## 2018-04-02 PROCEDURE — 74011250637 HC RX REV CODE- 250/637: Performed by: PHYSICIAN ASSISTANT

## 2018-04-02 PROCEDURE — 83735 ASSAY OF MAGNESIUM: CPT | Performed by: NURSE PRACTITIONER

## 2018-04-02 PROCEDURE — 77030019605

## 2018-04-02 PROCEDURE — 74011250637 HC RX REV CODE- 250/637: Performed by: INTERNAL MEDICINE

## 2018-04-02 PROCEDURE — 65660000000 HC RM CCU STEPDOWN

## 2018-04-02 PROCEDURE — 74011250637 HC RX REV CODE- 250/637: Performed by: NURSE PRACTITIONER

## 2018-04-02 RX ORDER — DILTIAZEM HYDROCHLORIDE 240 MG/1
240 CAPSULE, COATED, EXTENDED RELEASE ORAL DAILY
Status: DISCONTINUED | OUTPATIENT
Start: 2018-04-02 | End: 2018-04-05

## 2018-04-02 RX ORDER — DIGOXIN 125 MCG
0.12 TABLET ORAL DAILY
Status: DISCONTINUED | OUTPATIENT
Start: 2018-04-02 | End: 2018-04-03

## 2018-04-02 RX ADMIN — CARVEDILOL 3.12 MG: 3.12 TABLET, FILM COATED ORAL at 07:38

## 2018-04-02 RX ADMIN — HYDROCODONE BITARTRATE AND ACETAMINOPHEN 1 TABLET: 5; 325 TABLET ORAL at 22:06

## 2018-04-02 RX ADMIN — PANTOPRAZOLE SODIUM 40 MG: 40 TABLET, DELAYED RELEASE ORAL at 05:46

## 2018-04-02 RX ADMIN — DULOXETINE HYDROCHLORIDE 60 MG: 60 CAPSULE, DELAYED RELEASE ORAL at 09:12

## 2018-04-02 RX ADMIN — FUROSEMIDE 40 MG: 40 TABLET ORAL at 07:38

## 2018-04-02 RX ADMIN — FENOFIBRATE 160 MG: 160 TABLET ORAL at 09:12

## 2018-04-02 RX ADMIN — DILTIAZEM HYDROCHLORIDE 240 MG: 240 CAPSULE, COATED, EXTENDED RELEASE ORAL at 09:12

## 2018-04-02 RX ADMIN — SUCRALFATE 1 G: 1 TABLET ORAL at 07:38

## 2018-04-02 RX ADMIN — ASPIRIN 81 MG: 81 TABLET, COATED ORAL at 09:12

## 2018-04-02 RX ADMIN — SUCRALFATE 1 G: 1 TABLET ORAL at 11:40

## 2018-04-02 RX ADMIN — PRAVASTATIN SODIUM 80 MG: 80 TABLET ORAL at 09:12

## 2018-04-02 RX ADMIN — APIXABAN 5 MG: 5 TABLET, FILM COATED ORAL at 22:00

## 2018-04-02 RX ADMIN — LORAZEPAM 0.5 MG: 0.5 TABLET ORAL at 22:00

## 2018-04-02 RX ADMIN — APIXABAN 5 MG: 5 TABLET, FILM COATED ORAL at 09:12

## 2018-04-02 RX ADMIN — HYDROCODONE BITARTRATE AND ACETAMINOPHEN 1 TABLET: 5; 325 TABLET ORAL at 14:32

## 2018-04-02 RX ADMIN — SUCRALFATE 1 G: 1 TABLET ORAL at 17:03

## 2018-04-02 RX ADMIN — LEVOTHYROXINE SODIUM 50 MCG: 50 TABLET ORAL at 05:46

## 2018-04-02 RX ADMIN — CARVEDILOL 3.12 MG: 3.12 TABLET, FILM COATED ORAL at 17:03

## 2018-04-02 RX ADMIN — SUCRALFATE 1 G: 1 TABLET ORAL at 22:00

## 2018-04-02 RX ADMIN — DIPHENHYDRAMINE HYDROCHLORIDE 50 MG: 25 CAPSULE ORAL at 22:00

## 2018-04-02 RX ADMIN — LORAZEPAM 0.5 MG: 0.5 TABLET ORAL at 09:29

## 2018-04-02 RX ADMIN — SPIRONOLACTONE 25 MG: 25 TABLET, FILM COATED ORAL at 09:12

## 2018-04-02 RX ADMIN — POLYETHYLENE GLYCOL (3350) 17 G: 17 POWDER, FOR SOLUTION ORAL at 09:13

## 2018-04-02 RX ADMIN — FUROSEMIDE 40 MG: 40 TABLET ORAL at 17:03

## 2018-04-02 RX ADMIN — DIGOXIN 0.12 MG: 125 TABLET ORAL at 17:03

## 2018-04-02 NOTE — PROGRESS NOTES
Received a call back from Malika Lewis ([phone])([facility]) and he states they should have beds available. He will pull information from [facility] and call CM back in the morning.

## 2018-04-02 NOTE — PROGRESS NOTES
Problem: Pressure Injury - Risk of  Goal: *Prevention of pressure ulcer  Outcome: Progressing Towards Goal   04/01/18 2033   Wound Prevention and Protection Methods   Orientation of Wound Prevention Mid;Posterior   Location of Wound Prevention Sacrum/Coccyx   Dressing Present  Yes   Dressing Status Intact   Read Only, Retired: Wound Treatment (non-mechanical)   Wound Offloading (Prevention Methods) Bed, pressure reduction mattress; Foam silicone

## 2018-04-02 NOTE — PROGRESS NOTES
PT Note:  Attempted PT. Patient refused PT stating that her knee is hurting. Encouraged patient to ask for pain medication and she agreed to call Rn. Will attempt another time/day as schedule permits.   Anat Gutierrez, PT, DPT

## 2018-04-02 NOTE — PROGRESS NOTES
Problem: Nutrition Deficit  Goal: *Optimize nutritional status  Outcome: Not Progressing Towards Goal  Patient not eating entire meals    Problem: Falls - Risk of  Goal: *Absence of Falls  Document Sofie Fall Risk and appropriate interventions in the flowsheet.    Outcome: Progressing Towards Goal  Fall Risk Interventions:  Mobility Interventions: Bed/chair exit alarm         Medication Interventions: Bed/chair exit alarm    Elimination Interventions: Call light in reach, Bed/chair exit alarm, Patient to call for help with toileting needs    History of Falls Interventions: Door open when patient unattended, Bed/chair exit alarm

## 2018-04-02 NOTE — PROGRESS NOTES
Bedside and Verbal shift change report given to Elaine Kaba RN (oncoming nurse) by self (offgoing nurse). Report included the following information SBAR, Kardex, MAR and Recent Results.

## 2018-04-02 NOTE — PROGRESS NOTES
Called intake / admissions at [facility] / no answer, left VM. Referral to [facility] sent through [facility]. PPD placed on 3/30/18.

## 2018-04-02 NOTE — PROGRESS NOTES
4/2/2018 8:03 AM    Admit Date: 3/29/2018    Admit Diagnosis: Atrial fibrillation with RVR (HCC)      Subjective:   No cp or sob      Objective:      Visit Vitals    /85 (BP 1 Location: Right arm, BP Patient Position: Sitting)    Pulse (!) 126    Temp 98.3 °F (36.8 °C)    Resp 20    Ht 5' 2\" (1.575 m)    Wt 61 kg (134 lb 6.4 oz)    SpO2 94%    Breastfeeding No    BMI 24.58 kg/m2       Physical Exam:  [facility], Well Nourished, No Acute Distress, Alert & Oriented x 3, appropriate mood. Neck- supple, no JVD  CV- regular rate and rhythm no MRG  Lung- clear bilaterally  Abd- soft, nontender, nondistended  Ext- no edema bilaterally. Skin- warm and dry        Data Review:   Recent Labs      04/02/18   0557  04/01/18   0423   NA  134*  135*   K  3.8  3.9   BUN  15  17   CREA  0.57*  0.42*   WBC   --   8.6   HGB   --   11.2*   HCT   --   34.3*   PLT   --   329       Assessment/Plan:     Principal Problem:    Atrial fibrillation with RVR (HCC) (3/29/2018)- rate has been well controlled now elevated- increase long acting cardizem    Active Problems:    HLD (hyperlipidemia) (9/2/2016)      HTN (hypertension) (9/2/2016)Stable. Continue current medical therapy. CAD (coronary artery disease) ()    Acute systolic  CHF (congestive heart failure) ([facility] [address] (3/29/2018)Improved with current therapy.  Will continue medications

## 2018-04-02 NOTE — PROGRESS NOTES
Bedside and Verbal shift change report given to self (oncoming nurse) by Barry Vela RN (offgoing nurse). Report included the following information SBAR, Kardex, Intake/Output, MAR and Recent Results.

## 2018-04-02 NOTE — PROGRESS NOTES
Called Dr. Radha Buckley about patient heart rate running in the 100's and patient blood pressure 100's/60s and creatinine at 0.57. Verbal order received for 0.125 digoxin daily. Will continue to monitor.

## 2018-04-02 NOTE — PROGRESS NOTES
Bedside and Verbal shift change report given to Josh Travis RN (oncoming nurse) by self Chen Argue nurse). Report included the following information SBAR, Kardex, Intake/Output, MAR and Recent Results.

## 2018-04-03 LAB
ANION GAP SERPL CALC-SCNC: 10 MMOL/L (ref 7–16)
ATRIAL RATE: 127 BPM
ATRIAL RATE: 129 BPM
BACTERIA SPEC CULT: NORMAL
BACTERIA SPEC CULT: NORMAL
BUN SERPL-MCNC: 24 MG/DL (ref 8–23)
CALCIUM SERPL-MCNC: 9.2 MG/DL (ref 8.3–10.4)
CALCULATED P AXIS, ECG09: 68 DEGREES
CALCULATED P AXIS, ECG09: 73 DEGREES
CALCULATED R AXIS, ECG10: -39 DEGREES
CALCULATED R AXIS, ECG10: -5 DEGREES
CALCULATED T AXIS, ECG11: 116 DEGREES
CALCULATED T AXIS, ECG11: 134 DEGREES
CHLORIDE SERPL-SCNC: 92 MMOL/L (ref 98–107)
CO2 SERPL-SCNC: 32 MMOL/L (ref 21–32)
CREAT SERPL-MCNC: 0.57 MG/DL (ref 0.6–1)
DIAGNOSIS, 93000: NORMAL
DIAGNOSIS, 93000: NORMAL
GLUCOSE SERPL-MCNC: 96 MG/DL (ref 65–100)
MAGNESIUM SERPL-MCNC: 1.7 MG/DL (ref 1.8–2.4)
P-R INTERVAL, ECG05: 128 MS
P-R INTERVAL, ECG05: 94 MS
POTASSIUM SERPL-SCNC: 3.8 MMOL/L (ref 3.5–5.1)
Q-T INTERVAL, ECG07: 346 MS
Q-T INTERVAL, ECG07: 352 MS
QRS DURATION, ECG06: 156 MS
QRS DURATION, ECG06: 166 MS
QTC CALCULATION (BEZET), ECG08: 502 MS
QTC CALCULATION (BEZET), ECG08: 515 MS
SERVICE CMNT-IMP: NORMAL
SERVICE CMNT-IMP: NORMAL
SODIUM SERPL-SCNC: 134 MMOL/L (ref 136–145)
VENTRICULAR RATE, ECG03: 127 BPM
VENTRICULAR RATE, ECG03: 129 BPM

## 2018-04-03 PROCEDURE — 83735 ASSAY OF MAGNESIUM: CPT | Performed by: NURSE PRACTITIONER

## 2018-04-03 PROCEDURE — 65660000000 HC RM CCU STEPDOWN

## 2018-04-03 PROCEDURE — 74011250637 HC RX REV CODE- 250/637: Performed by: PHYSICIAN ASSISTANT

## 2018-04-03 PROCEDURE — 74011250637 HC RX REV CODE- 250/637: Performed by: INTERNAL MEDICINE

## 2018-04-03 PROCEDURE — 74011250637 HC RX REV CODE- 250/637: Performed by: NURSE PRACTITIONER

## 2018-04-03 PROCEDURE — 93005 ELECTROCARDIOGRAM TRACING: CPT | Performed by: INTERNAL MEDICINE

## 2018-04-03 PROCEDURE — 97530 THERAPEUTIC ACTIVITIES: CPT

## 2018-04-03 PROCEDURE — 80048 BASIC METABOLIC PNL TOTAL CA: CPT | Performed by: NURSE PRACTITIONER

## 2018-04-03 PROCEDURE — 36415 COLL VENOUS BLD VENIPUNCTURE: CPT | Performed by: NURSE PRACTITIONER

## 2018-04-03 RX ADMIN — HYDROCODONE BITARTRATE AND ACETAMINOPHEN 1 TABLET: 5; 325 TABLET ORAL at 15:31

## 2018-04-03 RX ADMIN — CARVEDILOL 3.12 MG: 3.12 TABLET, FILM COATED ORAL at 09:13

## 2018-04-03 RX ADMIN — SUCRALFATE 1 G: 1 TABLET ORAL at 22:54

## 2018-04-03 RX ADMIN — CARVEDILOL 3.12 MG: 3.12 TABLET, FILM COATED ORAL at 16:23

## 2018-04-03 RX ADMIN — LORAZEPAM 0.5 MG: 0.5 TABLET ORAL at 09:12

## 2018-04-03 RX ADMIN — APIXABAN 5 MG: 5 TABLET, FILM COATED ORAL at 09:13

## 2018-04-03 RX ADMIN — APIXABAN 2.5 MG: 2.5 TABLET, FILM COATED ORAL at 22:54

## 2018-04-03 RX ADMIN — DIGOXIN 0.12 MG: 125 TABLET ORAL at 09:13

## 2018-04-03 RX ADMIN — DULOXETINE HYDROCHLORIDE 60 MG: 60 CAPSULE, DELAYED RELEASE ORAL at 09:13

## 2018-04-03 RX ADMIN — DILTIAZEM HYDROCHLORIDE 240 MG: 240 CAPSULE, COATED, EXTENDED RELEASE ORAL at 09:13

## 2018-04-03 RX ADMIN — SUCRALFATE 1 G: 1 TABLET ORAL at 09:13

## 2018-04-03 RX ADMIN — SUCRALFATE 1 G: 1 TABLET ORAL at 11:34

## 2018-04-03 RX ADMIN — PRAVASTATIN SODIUM 80 MG: 80 TABLET ORAL at 09:13

## 2018-04-03 RX ADMIN — PANTOPRAZOLE SODIUM 40 MG: 40 TABLET, DELAYED RELEASE ORAL at 06:32

## 2018-04-03 RX ADMIN — FUROSEMIDE 40 MG: 40 TABLET ORAL at 15:32

## 2018-04-03 RX ADMIN — LEVOTHYROXINE SODIUM 50 MCG: 50 TABLET ORAL at 06:32

## 2018-04-03 RX ADMIN — HYDROCODONE BITARTRATE AND ACETAMINOPHEN 1 TABLET: 5; 325 TABLET ORAL at 23:35

## 2018-04-03 RX ADMIN — SPIRONOLACTONE 25 MG: 25 TABLET, FILM COATED ORAL at 09:13

## 2018-04-03 RX ADMIN — LORAZEPAM 0.5 MG: 0.5 TABLET ORAL at 22:54

## 2018-04-03 RX ADMIN — ASPIRIN 81 MG: 81 TABLET, COATED ORAL at 09:13

## 2018-04-03 RX ADMIN — FENOFIBRATE 160 MG: 160 TABLET ORAL at 09:13

## 2018-04-03 RX ADMIN — FUROSEMIDE 40 MG: 40 TABLET ORAL at 09:13

## 2018-04-03 RX ADMIN — DIPHENHYDRAMINE HYDROCHLORIDE 50 MG: 25 CAPSULE ORAL at 22:54

## 2018-04-03 RX ADMIN — SUCRALFATE 1 G: 1 TABLET ORAL at 15:32

## 2018-04-03 RX ADMIN — POLYETHYLENE GLYCOL (3350) 17 G: 17 POWDER, FOR SOLUTION ORAL at 09:13

## 2018-04-03 NOTE — PROGRESS NOTES
OT NOTE    Attempted to see pt this p.m. Pt stated that she had just got done eating and was full. She asked if therapy could return later on. Will attempt treatment later.     Reyna Friday, OT

## 2018-04-03 NOTE — PROGRESS NOTES
Bedside and Verbal shift change report given to Magdalena Oconnor RN (oncoming nurse) by self Collette Heredia nurse). Report included the following information SBAR, Kardex, Intake/Output, MAR and Recent Results.

## 2018-04-03 NOTE — PROGRESS NOTES
Bedside and Verbal shift change report given to self (oncoming nurse) by Lainey Ge RN (offgoing nurse). Report included the following information SBAR, Kardex, Intake/Output, MAR and Recent Results.

## 2018-04-03 NOTE — PROGRESS NOTES
Problem: Pressure Injury - Risk of  Goal: *Prevention of pressure ulcer  Outcome: Progressing Towards Goal  Back and

## 2018-04-03 NOTE — PROGRESS NOTES
[facility] CARDIOLOGY PROGRESS NOTE           4/3/2018 3:51 PM    Admit Date: 3/29/2018    Admit Diagnosis: Atrial fibrillation with RVR (HCC)      Subjective:   No complaint, no chest pain or shortness of breath    Interval History: (History of pertinent interval events obtained from nursing staff)  Continues with paroxysms of rapid ventricular response    ROS:  GEN:  No fever or chills  Cardiovascular:  As noted above  Pulmonary:  As noted above  Neuro:  No new focal motor or sensory loss      Objective:     Vitals:    04/03/18 0432 04/03/18 0835 04/03/18 1225 04/03/18 1345   BP: 116/83 115/77 100/62    Pulse: (!) 126 (!) 128 (!) 129 88   Resp: 18 20 18    Temp: 97.9 °F (36.6 °C) 97.6 °F (36.4 °C) 96 °F (35.6 °C)    SpO2: 96% 94% 95%    Weight: 56.5 kg (124 lb 9.6 oz)      Height:           Physical Exam:  General- frail elderly female, NAD  Neck- supple, no JVD  CV- irreg, tachycardic, distant S1, S2  Lung- decreased BS B/L  Abd- soft, nontender, nondistended  Ext- no edema bilaterally.   Skin- warm and dry    Current Facility-Administered Medications   Medication Dose Route Frequency    apixaban (ELIQUIS) tablet 2.5 mg  2.5 mg Oral BID    dilTIAZem CD (CARDIZEM CD) capsule 240 mg  240 mg Oral DAILY    digoxin (LANOXIN) tablet 0.125 mg  0.125 mg Oral DAILY    carvedilol (COREG) tablet 3.125 mg  3.125 mg Oral BID WITH MEALS    spironolactone (ALDACTONE) tablet 25 mg  25 mg Oral DAILY    HYDROcodone-acetaminophen (NORCO) 5-325 mg per tablet 1 Tab  1 Tab Oral Q4H PRN    ondansetron (ZOFRAN) injection 4 mg  4 mg IntraVENous Q6H PRN    diphenhydrAMINE (BENADRYL) capsule 50 mg  50 mg Oral QHS PRN    furosemide (LASIX) tablet 40 mg  40 mg Oral ACB&D    LORazepam (ATIVAN) tablet 0.5 mg  0.5 mg Oral Q6H PRN    aspirin delayed-release tablet 81 mg  81 mg Oral DAILY    DULoxetine (CYMBALTA) capsule 60 mg  60 mg Oral DAILY    fenofibrate (LOFIBRA) tablet 160 mg  160 mg Oral DAILY    pantoprazole (PROTONIX) tablet 40 mg  40 mg Oral ACB    polyethylene glycol (MIRALAX) packet 17 g  17 g Oral DAILY    pravastatin (PRAVACHOL) tablet 80 mg  80 mg Oral DAILY    sucralfate (CARAFATE) tablet 1 g  1 g Oral AC&HS    levothyroxine (SYNTHROID) tablet 50 mcg  50 mcg Oral ACB    NUTRITIONAL SUPPORT ELECTROLYTE PRN ORDERS   Does Not Apply PRN    lip protectant (BLISTEX) ointment   Topical PRN     Data Review:   Recent Results (from the past 24 hour(s))   METABOLIC PANEL, BASIC    Collection Time: 04/03/18  4:25 AM   Result Value Ref Range    Sodium 134 (L) 136 - 145 mmol/L    Potassium 3.8 3.5 - 5.1 mmol/L    Chloride 92 (L) 98 - 107 mmol/L    CO2 32 21 - 32 mmol/L    Anion gap 10 7 - 16 mmol/L    Glucose 96 65 - 100 mg/dL    BUN 24 (H) 8 - 23 MG/DL    Creatinine 0.57 (L) 0.6 - 1.0 MG/DL    GFR est AA >60 >60 ml/min/1.73m2    GFR est non-AA >60 >60 ml/min/1.73m2    Calcium 9.2 8.3 - 10.4 MG/DL   MAGNESIUM    Collection Time: 04/03/18  4:25 AM   Result Value Ref Range    Magnesium 1.7 (L) 1.8 - 2.4 mg/dL   EKG, 12 LEAD, INITIAL    Collection Time: 04/03/18  8:09 AM   Result Value Ref Range    Ventricular Rate 129 BPM    Atrial Rate 129 BPM    P-R Interval 94 ms    QRS Duration 166 ms    Q-T Interval 352 ms    QTC Calculation (Bezet) 515 ms    Calculated P Axis 68 degrees    Calculated R Axis -5 degrees    Calculated T Axis 134 degrees    Diagnosis       !!! Poor data quality, interpretation may be adversely affected  Sinus tachycardia with short ID  Left bundle branch block  Abnormal ECG    Confirmed by ST ODILIA MONTANEZ MD (), MAGDALENA VICENTE ([id number]) on 4/3/2018 9:59:32 AM         EKG:  (EKG has been independently visualized by me with interpretation below)  Assessment:     Principal Problem:    Atrial fibrillation with RVR ([address] (3/29/2018)    Active Problems:    HLD (hyperlipidemia) (9/2/2016)      HTN (hypertension) (9/2/2016)      CAD (coronary artery disease) ()      CHF (congestive heart failure) (HCC) (3/29/2018)      Plan:   1. Afib/atypical atrial flutter with RVR, uncontrolled: pt continues to fail a rate control strategy, discussed the option of PABLITO guided DCCV and initation of amiodarone, discussed risks/benefits and alternatives today. Unfortunately, unable to pass PABLITO probe, either adult probe or pediatric probe even using a Mac blade to visualization. Discussed with the family the option of DCCV without PABLITO, using ICE to visualized the NEY and undergoing DCCV, and PPM and AV node ablation. Pt is failing rate control, HRs remain in the 130s. Family would like to proceed with ICE guided DCCV with the understanding this is \"off label\" and is not the gold standard for visualization of the NEY. 2. Esophageal ?stricture/mass: Pt needs work up for esophageal stricture or mass    Jazmine Haynes MD  Cardiology/Electrophysiology

## 2018-04-03 NOTE — PROGRESS NOTES
MEWS score noted to be 3. High MEWS score noted due to heart rate of 126. Patient is known to be in Afib with heart rate in the 110's to 120's. Will continue to monitor.

## 2018-04-03 NOTE — PROGRESS NOTES
Problem: Mobility Impaired (Adult and Pediatric)  Goal: *Acute Goals and Plan of Care (Insert Text)  LTG:  (1.)Ms. Giovanni Huddleston will move from supine to sit and sit to supine , scoot up and down and roll side to side in bed with MODIFIED INDEPENDENCE within 7 treatment day(s). (2.)Ms. Giovanni Huddleston will transfer from bed to chair and chair to bed with MODIFIED INDEPENDENCE using the least restrictive device within 7 treatment day(s). (3.)Ms. Giovanni Huddleston will ambulate with SUPERVISION for 250 feet with the least restrictive device within 7 treatment day(s). (4.)Ms. Giovanni Huddleston will participate in therapeutic activity/exerices x 23 minutes for increased strength within 7 days. PHYSICAL THERAPY: Daily Note, Treatment Day: 2nd, AM 4/3/2018  INPATIENT: Hospital Day: 6  [place] / [place] / Product Type: Medicare /      NAME/AGE/GENDER: Gideon Aguero is a 80 y.o. female   PRIMARY DIAGNOSIS: Atrial fibrillation with RVR ([facility]) Atrial fibrillation with RVR ([facility]) Atrial fibrillation with RVR [facility]        ICD-10: Treatment Diagnosis:    · Generalized Muscle Weakness (M62.81)  · Difficulty in walking, Not elsewhere classified (R26.2)  · History of falling (Z91.81)   Precaution/Allergies:  Keflex [cephalexin]; Pcn [penicillins]; and Sulfa (sulfonamide antibiotics)      ASSESSMENT:     Ms. Giovanni Huddleston is a 80 y.o. female in the hospital for the above with a recent history of falling. Pt reports that she lives in a one story house by herself with a ramp to enter. She reported being independent with ADLs and ambulates with rollator. Pt presents supine in bed and agreeable to PT.  on monitor. Transitioned to sit with SBA. Patient stood with SBA and ambulated into restroom with CGA. She then ambulated with RW for 80 feet in room and santillan with CGA x 1. Ms. Giovanni Huddleston ambulated by leading with L LE and with decreased R step length. Back to room and sat in recliner, performed LE AROM.   HR 129-130 throughout PT session. Patient demonstrated progress with all mobility today. Pt appears to be functioning below her baseline and could benefit from skilled PT. This section established at most recent assessment   PROBLEM LIST (Impairments causing functional limitations):  1. Decreased Strength  2. Decreased Transfer Abilities  3. Decreased Ambulation Ability/Technique  4. Decreased Balance   INTERVENTIONS PLANNED: (Benefits and precautions of physical therapy have been discussed with the patient.)  1. Balance Exercise  2. Bed Mobility  3. Family Education  4. Gait Training  5. Therapeutic Activites  6. Therapeutic Exercise/Strengthening  7. Transfer Training  8. Group Therapy     TREATMENT PLAN: Frequency/Duration: 3 times a week for duration of hospital stay  Rehabilitation Potential For Stated Goals: Good     RECOMMENDED REHABILITATION/EQUIPMENT: (at time of discharge pending progress): Due to the probability of continued deficits (see above) this patient will likely need continued skilled physical therapy after discharge. Equipment:    None at this time              HISTORY:   History of Present Injury/Illness (Reason for Referral):  Per H&P: \"   HPI:  Jeni Plaza is a 80 y.o. female with past medical history of CAD, CHF, depression, HTN, hypothyroidism, and PUD who presented to the ER from [facility] with complaints of weakness and shortness of breath. Patient reports decreased appetite over the past several days. EKG done at urgent care center showed atrial fibrillation with RVR and she was sent to the ER for further evaluation. Upon arrival to the ER, repeat EKG confirmed AFIB with RVR with rate of 130. CXR shows worsening cardiomegaly with pulmonary edema with BNP of 351. While in the ER, she was given 10mg IV Cardizem bolus x 2 followed by drip and 40 mg IV Lasix. After Lasix administration, wayne was placed with good urine output.  \"  Past Medical History/Comorbidities:   Ms. Pacheco Montes  has a past medical history of Arthritis; CAD (coronary artery disease); CHF (congestive heart failure) ([address] Depression; Hypertension; Hypothyroidism; Neuropathy; and PUD (peptic ulcer disease). Ms. Rosa Nurse  has a past surgical history that includes pr cardiac surg procedure unlist; hx appendectomy; hx cholecystectomy; hx orthopaedic; hx heent; and hx heart catheterization. Social History/Living Environment:   Home Environment: Private residence  # Steps to Enter: 0  Rails to Enter: Yes  Wheelchair Ramp: Yes  One/Two Story Residence: One story  Living Alone: Yes  Support Systems: Child(ulisses)  Patient Expects to be Discharged to[de-identified] Private residence  Current DME Used/Available at Home: Daiana Janene, rollator, Grab bars, Shower chair  Tub or Shower Type: Shower  Prior Level of Function/Work/Activity:  Pt lives alone in a one story house with ramp to enter. Independent with ADLs and uses rollator for ambulation. Number of Personal Factors/Comorbidities that affect the Plan of Care:  fall 3+: HIGH COMPLEXITY   EXAMINATION:   Most Recent Physical Functioning:   Gross Assessment:                  Posture:  Posture (WDL): Exceptions to WDL  Posture Assessment: Kyphosis, Forward head, Rounded shoulders  Balance:  Sitting: Intact  Standing: Impaired  Standing - Static: Constant support  Standing - Dynamic : Poor Bed Mobility:  Supine to Sit: Stand-by assistance  Scooting: Stand-by assistance  Wheelchair Mobility:     Transfers:  Sit to Stand: Stand-by assistance  Stand to Sit: Stand-by assistance  Gait:     Speed/Rpiya: Slow;Shuffled  Step Length: Right shortened;Left shortened  Gait Abnormalities: Step to gait  Distance (ft): 80 Feet (ft)  Assistive Device: Walker, rolling;Gait belt  Ambulation - Level of Assistance: Contact guard assistance      Body Structures Involved:  1. Heart  2. Muscles Body Functions Affected:  1. Cardio  2. Neuromusculoskeletal  3.  Movement Related Activities and Participation Affected:  1. Mobility  2. Domestic Life  3. Community, Social and [place]   Number of elements that affect the Plan of Care: 4+: HIGH COMPLEXITY   CLINICAL PRESENTATION:   Presentation: Stable and uncomplicated: LOW COMPLEXITY   CLINICAL DECISION MAKIN [facility] Inpatient Short Form  How much difficulty does the patient currently have. .. Unable A Lot A Little None   1. Turning over in bed (including adjusting bedclothes, sheets and blankets)? [] 1   [] 2   [] 3   [x] 4   2. Sitting down on and standing up from a chair with arms ( e.g., wheelchair, bedside commode, etc.)   [] 1   [] 2   [x] 3   [] 4   3. Moving from lying on back to sitting on the side of the bed? [] 1   [] 2   [] 3   [x] 4   How much help from another person does the patient currently need. .. Total A Lot A Little None   4. Moving to and from a bed to a chair (including a wheelchair)? [] 1   [] 2   [x] 3   [] 4   5. Need to walk in hospital room? [] 1   [] 2   [x] 3   [] 4   6. Climbing 3-5 steps with a railing? [] 1   [] 2   [x] 3   [] 4   © , Trustees of [address] [place] [address] [address] [id number], under license to [employer]. All rights reserved      Score:  Initial: 20 Most Recent: X (Date: -- )    Interpretation of Tool:  Represents activities that are increasingly more difficult (i.e. Bed mobility, Transfers, Gait). Score 24 23 22-20 19-15 14-10 9-7 6     Modifier CH CI CJ CK CL CM CN      ? Mobility - Walking and Moving Around:     - CURRENT STATUS: CJ - 20%-39% impaired, limited or restricted    - GOAL STATUS: CI - 1%-19% impaired, limited or restricted    - D/C STATUS:  ---------------To be determined---------------  [place] / Product Type: Medicare /      Medical Necessity:     · Patient demonstrates good rehab potential due to higher previous functional level.   Reason for Services/Other Comments:  · Patient continues to require skilled intervention due to decreased functional mobility and balance. Use of outcome tool(s) and clinical judgement create a POC that gives a: Clear prediction of patient's progress: LOW COMPLEXITY            TREATMENT:   (In addition to Assessment/Re-Assessment sessions the following treatments were rendered)   Pre-treatment Symptoms/Complaints:  Patient is very talkative and performs all therapeutic activity slowly. Pain: Initial:   Pain Intensity 1: 0  Post Session:  0     Therapeutic Activity: (    25 mins): Therapeutic activities including Bed transfers, Chair transfers, toilet transfers and Ambulation on level ground to improve mobility, strength, balance, coordination and dynamic movement of leg - bilateral to improve functional dynamic mobility. Required minimal cueing   to promote motor control of bilateral, lower extremity(s). Date:  4/3/18 Date:   Date:     Activity/Exercise Parameters Parameters Parameters   Long arc quads x20 AB     Hip flexion in sit x20 AB     Ankle pumps x20 AB                               Braces/Orthotics/Lines/Etc:   · Eagle monitor  Treatment/Session Assessment:    · Response to Treatment:    · Interdisciplinary Collaboration:   o Physical Therapist  o Registered Nurse  · After treatment position/precautions:   o Up in chair  o Bed alarm/tab alert on  o Bed/Chair-wheels locked  o Call light within reach  o PCT notified   · Compliance with Program/Exercises: Will assess as treatment progresses. · Recommendations/Intent for next treatment session: \"Next visit will focus on advancements to more challenging activities and reduction in assistance provided\".   Total Treatment Duration:  PT Patient Time In/Time Out  Time In: 1125  Time Out: Kimberly PT, DPT

## 2018-04-03 NOTE — PROGRESS NOTES
MONICA has spoken with Dave Roberts at [facility]. They have accepted pt for rehab placement and son at bedside has been updated and is agreeable to placement. Will follow up with rounding physician about anticipated D/C date and update facility and family to coordinate pt transfer.

## 2018-04-03 NOTE — PROGRESS NOTES
4/3/2018 1:27 PM    Admit Date: 3/29/2018    Admit Diagnosis: Atrial fibrillation with RVR (HCC)      Subjective:   No cp or sob      Objective:      Visit Vitals    /62 (BP 1 Location: Right arm, BP Patient Position: Sitting)    Pulse (!) 129    Temp 96 °F (35.6 °C)    Resp 18    Ht 5' 2\" (1.575 m)    Wt 56.5 kg (124 lb 9.6 oz)    SpO2 95%    Breastfeeding No    BMI 22.79 kg/m2       Physical Exam:  General-Well Developed, Well Nourished, No Acute Distress, Alert & Oriented x 3, appropriate mood. Neck- supple, no JVD  CV- regular rate, tachy and rhythm no MRG  Lung- clear bilaterally  Abd- soft, nontender, nondistended  Ext- no edema bilaterally. Skin- warm and dry        Data Review:   Recent Labs      04/03/18   0425   04/01/18   0423   NA  134*   < >  135*   K  3.8   < >  3.9   BUN  24*   < >  17   CREA  0.57*   < >  0.42*   WBC   --    --   8.6   HGB   --    --   11.2*   HCT   --    --   34.3*   PLT   --    --   329    < > = values in this interval not displayed. Assessment/Plan:     Principal Problem:    Atrial fibrillation with RVR ([facility] (3/29/2018)- now rrr, tachy- order 12 lead ekg- eps to see    Active Problems:    HLD (hyperlipidemia) (9/2/2016)      HTN (hypertension) (9/2/2016)Stable. Continue current medical therapy. CAD (coronary artery disease) ()Stable. Continue current medical therapy.       CHF (congestive heart failure) ([facility] (3/29/2018)

## 2018-04-04 ENCOUNTER — ANESTHESIA EVENT (OUTPATIENT)
Dept: SURGERY | Age: 83
DRG: 308 | End: 2018-04-04
Payer: MEDICARE

## 2018-04-04 ENCOUNTER — ANESTHESIA (OUTPATIENT)
Dept: SURGERY | Age: 83
DRG: 308 | End: 2018-04-04
Payer: MEDICARE

## 2018-04-04 LAB
ANION GAP SERPL CALC-SCNC: 10 MMOL/L (ref 7–16)
APPEARANCE UR: ABNORMAL
BACTERIA URNS QL MICRO: ABNORMAL /HPF
BILIRUB UR QL: NEGATIVE
BUN SERPL-MCNC: 27 MG/DL (ref 8–23)
CALCIUM SERPL-MCNC: 9.8 MG/DL (ref 8.3–10.4)
CASTS URNS QL MICRO: ABNORMAL /LPF
CHLORIDE SERPL-SCNC: 91 MMOL/L (ref 98–107)
CO2 SERPL-SCNC: 32 MMOL/L (ref 21–32)
COLOR UR: YELLOW
CREAT SERPL-MCNC: 0.56 MG/DL (ref 0.6–1)
EPI CELLS #/AREA URNS HPF: 0 /HPF
GLUCOSE SERPL-MCNC: 89 MG/DL (ref 65–100)
GLUCOSE UR STRIP.AUTO-MCNC: NEGATIVE MG/DL
HGB UR QL STRIP: NEGATIVE
KETONES UR QL STRIP.AUTO: NEGATIVE MG/DL
LEUKOCYTE ESTERASE UR QL STRIP.AUTO: ABNORMAL
MAGNESIUM SERPL-MCNC: 1.7 MG/DL (ref 1.8–2.4)
NITRITE UR QL STRIP.AUTO: NEGATIVE
PH UR STRIP: 8 [PH] (ref 5–9)
POTASSIUM SERPL-SCNC: 3.9 MMOL/L (ref 3.5–5.1)
PROT UR STRIP-MCNC: NEGATIVE MG/DL
RBC #/AREA URNS HPF: ABNORMAL /HPF
SODIUM SERPL-SCNC: 133 MMOL/L (ref 136–145)
SP GR UR REFRACTOMETRY: 1.01 (ref 1–1.02)
UROBILINOGEN UR QL STRIP.AUTO: 0.2 EU/DL (ref 0.2–1)
WBC URNS QL MICRO: >100 /HPF

## 2018-04-04 PROCEDURE — 76060000032 HC ANESTHESIA 0.5 TO 1 HR: Performed by: INTERNAL MEDICINE

## 2018-04-04 PROCEDURE — 77030019605

## 2018-04-04 PROCEDURE — 65660000000 HC RM CCU STEPDOWN

## 2018-04-04 PROCEDURE — 74011250637 HC RX REV CODE- 250/637: Performed by: HOSPITALIST

## 2018-04-04 PROCEDURE — 93799 UNLISTED CV SVC/PROCEDURE: CPT

## 2018-04-04 PROCEDURE — B246ZZZ ULTRASONOGRAPHY OF RIGHT AND LEFT HEART: ICD-10-PCS | Performed by: INTERNAL MEDICINE

## 2018-04-04 PROCEDURE — 76937 US GUIDE VASCULAR ACCESS: CPT

## 2018-04-04 PROCEDURE — 74011000250 HC RX REV CODE- 250

## 2018-04-04 PROCEDURE — 74011000258 HC RX REV CODE- 258: Performed by: INTERNAL MEDICINE

## 2018-04-04 PROCEDURE — 80048 BASIC METABOLIC PNL TOTAL CA: CPT | Performed by: NURSE PRACTITIONER

## 2018-04-04 PROCEDURE — C1759 CATH, INTRA ECHOCARDIOGRAPHY: HCPCS

## 2018-04-04 PROCEDURE — 97530 THERAPEUTIC ACTIVITIES: CPT

## 2018-04-04 PROCEDURE — 77030013687 HC GD NDL BARD -B

## 2018-04-04 PROCEDURE — 74011250637 HC RX REV CODE- 250/637: Performed by: PHYSICIAN ASSISTANT

## 2018-04-04 PROCEDURE — 74011250636 HC RX REV CODE- 250/636: Performed by: INTERNAL MEDICINE

## 2018-04-04 PROCEDURE — C1894 INTRO/SHEATH, NON-LASER: HCPCS

## 2018-04-04 PROCEDURE — 74011250637 HC RX REV CODE- 250/637: Performed by: INTERNAL MEDICINE

## 2018-04-04 PROCEDURE — 74011250636 HC RX REV CODE- 250/636

## 2018-04-04 PROCEDURE — 92960 CARDIOVERSION ELECTRIC EXT: CPT

## 2018-04-04 PROCEDURE — 5A2204Z RESTORATION OF CARDIAC RHYTHM, SINGLE: ICD-10-PCS | Performed by: INTERNAL MEDICINE

## 2018-04-04 PROCEDURE — 36415 COLL VENOUS BLD VENIPUNCTURE: CPT | Performed by: NURSE PRACTITIONER

## 2018-04-04 PROCEDURE — 93662 INTRACARDIAC ECG (ICE): CPT

## 2018-04-04 PROCEDURE — 81001 URINALYSIS AUTO W/SCOPE: CPT | Performed by: NURSE PRACTITIONER

## 2018-04-04 PROCEDURE — 83735 ASSAY OF MAGNESIUM: CPT | Performed by: NURSE PRACTITIONER

## 2018-04-04 PROCEDURE — 33999 UNLISTED PX CARDIAC SURGERY: CPT

## 2018-04-04 PROCEDURE — 74011250637 HC RX REV CODE- 250/637: Performed by: NURSE PRACTITIONER

## 2018-04-04 RX ORDER — LIDOCAINE HYDROCHLORIDE 20 MG/ML
INJECTION, SOLUTION EPIDURAL; INFILTRATION; INTRACAUDAL; PERINEURAL AS NEEDED
Status: DISCONTINUED | OUTPATIENT
Start: 2018-04-04 | End: 2018-04-04 | Stop reason: HOSPADM

## 2018-04-04 RX ORDER — PROPOFOL 10 MG/ML
INJECTION, EMULSION INTRAVENOUS AS NEEDED
Status: DISCONTINUED | OUTPATIENT
Start: 2018-04-04 | End: 2018-04-04 | Stop reason: HOSPADM

## 2018-04-04 RX ORDER — HYDROCODONE BITARTRATE AND ACETAMINOPHEN 5; 325 MG/1; MG/1
1 TABLET ORAL ONCE
Status: COMPLETED | OUTPATIENT
Start: 2018-04-04 | End: 2018-04-04

## 2018-04-04 RX ORDER — MAGNESIUM SULFATE HEPTAHYDRATE 40 MG/ML
2 INJECTION, SOLUTION INTRAVENOUS ONCE
Status: COMPLETED | OUTPATIENT
Start: 2018-04-04 | End: 2018-04-04

## 2018-04-04 RX ORDER — PROPOFOL 10 MG/ML
INJECTION, EMULSION INTRAVENOUS
Status: DISCONTINUED | OUTPATIENT
Start: 2018-04-04 | End: 2018-04-04 | Stop reason: HOSPADM

## 2018-04-04 RX ORDER — SODIUM CHLORIDE, SODIUM LACTATE, POTASSIUM CHLORIDE, CALCIUM CHLORIDE 600; 310; 30; 20 MG/100ML; MG/100ML; MG/100ML; MG/100ML
INJECTION, SOLUTION INTRAVENOUS
Status: DISCONTINUED | OUTPATIENT
Start: 2018-04-04 | End: 2018-04-04 | Stop reason: HOSPADM

## 2018-04-04 RX ADMIN — MAGNESIUM SULFATE HEPTAHYDRATE 2 G: 40 INJECTION, SOLUTION INTRAVENOUS at 17:08

## 2018-04-04 RX ADMIN — LORAZEPAM 0.5 MG: 0.5 TABLET ORAL at 09:08

## 2018-04-04 RX ADMIN — PROPOFOL 20 MG: 10 INJECTION, EMULSION INTRAVENOUS at 15:31

## 2018-04-04 RX ADMIN — LEVOTHYROXINE SODIUM 50 MCG: 50 TABLET ORAL at 06:27

## 2018-04-04 RX ADMIN — FUROSEMIDE 40 MG: 40 TABLET ORAL at 09:09

## 2018-04-04 RX ADMIN — SUCRALFATE 1 G: 1 TABLET ORAL at 22:47

## 2018-04-04 RX ADMIN — PROPOFOL 20 MG: 10 INJECTION, EMULSION INTRAVENOUS at 15:20

## 2018-04-04 RX ADMIN — FUROSEMIDE 40 MG: 40 TABLET ORAL at 17:01

## 2018-04-04 RX ADMIN — CARVEDILOL 3.12 MG: 3.12 TABLET, FILM COATED ORAL at 17:01

## 2018-04-04 RX ADMIN — SODIUM CHLORIDE, SODIUM LACTATE, POTASSIUM CHLORIDE, CALCIUM CHLORIDE: 600; 310; 30; 20 INJECTION, SOLUTION INTRAVENOUS at 15:14

## 2018-04-04 RX ADMIN — PRAVASTATIN SODIUM 80 MG: 80 TABLET ORAL at 09:08

## 2018-04-04 RX ADMIN — HYDROCODONE BITARTRATE AND ACETAMINOPHEN 1 TABLET: 5; 325 TABLET ORAL at 03:19

## 2018-04-04 RX ADMIN — APIXABAN 2.5 MG: 2.5 TABLET, FILM COATED ORAL at 20:24

## 2018-04-04 RX ADMIN — PROPOFOL 20 MG: 10 INJECTION, EMULSION INTRAVENOUS at 15:22

## 2018-04-04 RX ADMIN — SUCRALFATE 1 G: 1 TABLET ORAL at 09:08

## 2018-04-04 RX ADMIN — AMIODARONE HYDROCHLORIDE 1 MG/MIN: 50 INJECTION, SOLUTION INTRAVENOUS at 17:51

## 2018-04-04 RX ADMIN — SODIUM CHLORIDE, SODIUM LACTATE, POTASSIUM CHLORIDE, CALCIUM CHLORIDE: 600; 310; 30; 20 INJECTION, SOLUTION INTRAVENOUS at 12:12

## 2018-04-04 RX ADMIN — SUCRALFATE 1 G: 1 TABLET ORAL at 17:00

## 2018-04-04 RX ADMIN — APIXABAN 2.5 MG: 2.5 TABLET, FILM COATED ORAL at 09:08

## 2018-04-04 RX ADMIN — SPIRONOLACTONE 25 MG: 25 TABLET, FILM COATED ORAL at 09:08

## 2018-04-04 RX ADMIN — PANTOPRAZOLE SODIUM 40 MG: 40 TABLET, DELAYED RELEASE ORAL at 06:27

## 2018-04-04 RX ADMIN — HYDROCODONE BITARTRATE AND ACETAMINOPHEN 1 TABLET: 5; 325 TABLET ORAL at 02:51

## 2018-04-04 RX ADMIN — CARVEDILOL 3.12 MG: 3.12 TABLET, FILM COATED ORAL at 09:07

## 2018-04-04 RX ADMIN — PROPOFOL 100 MCG/KG/MIN: 10 INJECTION, EMULSION INTRAVENOUS at 12:28

## 2018-04-04 RX ADMIN — LORAZEPAM 0.5 MG: 0.5 TABLET ORAL at 20:24

## 2018-04-04 RX ADMIN — PROPOFOL 30 MG: 10 INJECTION, EMULSION INTRAVENOUS at 15:41

## 2018-04-04 RX ADMIN — FENOFIBRATE 160 MG: 160 TABLET ORAL at 09:09

## 2018-04-04 RX ADMIN — LIDOCAINE HYDROCHLORIDE 40 MG: 20 INJECTION, SOLUTION EPIDURAL; INFILTRATION; INTRACAUDAL; PERINEURAL at 12:17

## 2018-04-04 RX ADMIN — DILTIAZEM HYDROCHLORIDE 240 MG: 240 CAPSULE, COATED, EXTENDED RELEASE ORAL at 09:08

## 2018-04-04 RX ADMIN — HYDROCODONE BITARTRATE AND ACETAMINOPHEN 1 TABLET: 5; 325 TABLET ORAL at 23:12

## 2018-04-04 RX ADMIN — HYDROCODONE BITARTRATE AND ACETAMINOPHEN 1 TABLET: 5; 325 TABLET ORAL at 03:18

## 2018-04-04 RX ADMIN — DULOXETINE HYDROCHLORIDE 60 MG: 60 CAPSULE, DELAYED RELEASE ORAL at 09:08

## 2018-04-04 RX ADMIN — PROPOFOL 20 MG: 10 INJECTION, EMULSION INTRAVENOUS at 15:28

## 2018-04-04 RX ADMIN — PROPOFOL 40 MG: 10 INJECTION, EMULSION INTRAVENOUS at 12:18

## 2018-04-04 RX ADMIN — ASPIRIN 81 MG: 81 TABLET, COATED ORAL at 09:08

## 2018-04-04 RX ADMIN — PROPOFOL 100 MCG/KG/MIN: 10 INJECTION, EMULSION INTRAVENOUS at 15:20

## 2018-04-04 RX ADMIN — HYDROCODONE BITARTRATE AND ACETAMINOPHEN 1 TABLET: 5; 325 TABLET ORAL at 17:00

## 2018-04-04 RX ADMIN — PROPOFOL 20 MG: 10 INJECTION, EMULSION INTRAVENOUS at 12:19

## 2018-04-04 RX ADMIN — PROPOFOL 20 MG: 10 INJECTION, EMULSION INTRAVENOUS at 15:34

## 2018-04-04 NOTE — PROGRESS NOTES
Patient c/o leg and lower back painl 9/10. She states that \"\"it's possibly related to PT/OT from previous day, it's more than usual\". Patient is noted to be in occasional Vtach, with HR hovering 128 bpm through out the shift. MD Rishabh Marquez notified, see new orders for pain level.

## 2018-04-04 NOTE — PROGRESS NOTES
Report received from Dylan Hollingsworth and Ezekiel LOVE. Procedural findings communicated. Intra procedural  medication administration reviewed. Progression of care discussed. Patient received into [address] post unsuccessful PABLITO/Cardioversion.      Routine post procedural vital signs initiated

## 2018-04-04 NOTE — PROGRESS NOTES
TRANSFER - IN REPORT:    Verbal report received from [facility], RN(name) on Jocelyne Valdez  being received from telemetry room 317(unit) for ordered procedure      Report consisted of patients Situation, Background, Assessment and   Recommendations(SBAR). Information from the following report(s) SBAR was reviewed with the receiving nurse. Opportunity for questions and clarification was provided. Assessment completed upon patients arrival to unit and care assumed.

## 2018-04-04 NOTE — PROGRESS NOTES
Received call from Dr. Daivd Cogan. Patient to be on bedrest for 3 hours. New orders received to start 1 mg/min amio gtt tonight.

## 2018-04-04 NOTE — ANESTHESIA POSTPROCEDURE EVALUATION
Post-Anesthesia Evaluation and Assessment    Patient: Patsy Dhillon MRN: [id number]  SSN: [id number]    YOB: 1928  Age: 80 y.o. Sex: female       Cardiovascular Function/Vital Signs  Visit Vitals    /58    Pulse 74    Temp 36.5 °C (97.7 °F)    Resp 16    Ht 5' 2\" (1.575 m)    Wt 59.2 kg (130 lb 8 oz)    SpO2 96%    Breastfeeding No    BMI 23.87 kg/m2       Patient is status post total IV anesthesia anesthesia for Procedure(s):  TRANSESOPHAGEAL ECHOCARDIOGRAPHY GUIDED CARDIOVERSION. Nausea/Vomiting: None    Postoperative hydration reviewed and adequate. Pain:  Pain Scale 1: Numeric (0 - 10) (04/04/18 1600)  Pain Intensity 1: 0 (04/04/18 1600)   Managed    Neurological Status:   Neuro  Neurologic State: Alert (04/04/18 0800)  Orientation Level: Oriented X4 (04/04/18 0800)  Cognition: Follows commands (04/04/18 0800)  Speech: Clear (04/04/18 0800)  Assessment L Pupil: Brisk;Round (03/30/18 0700)  Size L Pupil (mm): 3 (03/30/18 0700)  Assessment R Pupil: Brisk;Round (03/30/18 0700)  Size R Pupil (mm): 3 (03/30/18 0700)  LUE Motor Response: Purposeful;Spontaneous ;Weak (03/31/18 1945)  LLE Motor Response: Purposeful;Spontaneous ;Weak (03/31/18 1945)  RUE Motor Response: Purposeful;Spontaneous ;Weak (03/31/18 1945)  RLE Motor Response: Purposeful;Spontaneous ;Weak (03/31/18 1945)   At baseline    Mental Status and Level of Consciousness: Arousable    Pulmonary Status:   O2 Device: Nasal cannula (04/04/18 1600)   Adequate oxygenation and airway patent    Complications related to anesthesia: None    Post-anesthesia assessment completed.  No concerns    Signed By: Philomena Nicholson MD     April 4, 2018

## 2018-04-04 NOTE — PROGRESS NOTES
Bedside and Verbal shift change report given to self (oncoming nurse) by [facility], [facility], RN (offgoing nurse). Report included the following information SBAR, Kardex, MAR and Recent Results.

## 2018-04-04 NOTE — ANESTHESIA PREPROCEDURE EVALUATION
Anesthetic History               Review of Systems / Medical History  Patient summary reviewed and pertinent labs reviewed    Pulmonary                   Neuro/Psych         Psychiatric history     Cardiovascular    Hypertension: well controlled        Dysrhythmias : atrial fibrillation  CAD and cardiac stents    Exercise tolerance: <4 METS     GI/Hepatic/Renal           PUD     Endo/Other      Hypothyroidism  Arthritis     Other Findings              Physical Exam    Airway  Mallampati: I  TM Distance: > 6 cm  Neck ROM: normal range of motion   Mouth opening: Normal     Cardiovascular    Rhythm: irregular  Rate: abnormal         Dental    Dentition: Full lower dentures and Full upper dentures     Pulmonary  Breath sounds clear to auscultation               Abdominal         Other Findings            Anesthetic Plan    ASA: 4  Anesthesia type: total IV anesthesia            Anesthetic plan and risks discussed with: Patient

## 2018-04-04 NOTE — PROGRESS NOTES
Verbal bedside report given to Dariana Jordan oncoming RN. Patient's situation, background, assessment and recommendations provided. Opportunity for questions provided. Oncoming RN assumed care of patient.

## 2018-04-04 NOTE — PROGRESS NOTES
Problem: Mobility Impaired (Adult and Pediatric)  Goal: *Acute Goals and Plan of Care (Insert Text)  LTG:  (1.)Ms. Zoe Severe will move from supine to sit and sit to supine , scoot up and down and roll side to side in bed with MODIFIED INDEPENDENCE within 7 treatment day(s). (2.)Ms. Zoe Severe will transfer from bed to chair and chair to bed with MODIFIED INDEPENDENCE using the least restrictive device within 7 treatment day(s). (3.)Ms. Zoe Severe will ambulate with SUPERVISION for 250 feet with the least restrictive device within 7 treatment day(s). (4.)Ms. Zoe Severe will participate in therapeutic activity/exerices x 23 minutes for increased strength within 7 days. PHYSICAL THERAPY: Daily Note, Treatment Day: 3rd, AM 4/4/2018  INPATIENT: Hospital Day: 7  [place] / [place] / Product Type: Medicare /      NAME/AGE/GENDER: Janell Jimenez is a 80 y.o. female   PRIMARY DIAGNOSIS: Atrial fibrillation with RVR (HCC)  A Fib Atrial fibrillation with RVR (HCC) Atrial fibrillation with RVR (HCC)  Procedure(s) (LRB):  TRANSESOPHAGEAL ECHOCARDIOGRAPHY GUIDED CARDIOVERSION (N/A)  Day of Surgery  ICD-10: Treatment Diagnosis:    · Generalized Muscle Weakness (M62.81)  · Difficulty in walking, Not elsewhere classified (R26.2)  · History of falling (Z91.81)   Precaution/Allergies:  Keflex [cephalexin]; Pcn [penicillins]; and Sulfa (sulfonamide antibiotics)      ASSESSMENT:     Ms. Zoe Severe is a 80 y.o. female in the hospital for the above with a recent history of falling. Pt reports that she lives in a one story house by herself with a ramp to enter. She reported being independent with ADLs and ambulates with rollator. Pt presents supine in bed and refused PT initially due to had increased pain last night after more active day. However, patient agreeable to limited PT.  on monitor. Transitioned to sit with supervision. Patient stood with SBA and ambulated into restroom with CGA. Required cueing to keep RW close during hand washing, etc. She then ambulated back to bed with RW. Sit to supine with supervision. Ms. Calderón Skippjeff ambulated by leading with L LE and with decreased R step length. -130 throughout PT session. Progressing slowly, limited by knee pain. This section established at most recent assessment   PROBLEM LIST (Impairments causing functional limitations):  1. Decreased Strength  2. Decreased Transfer Abilities  3. Decreased Ambulation Ability/Technique  4. Decreased Balance   INTERVENTIONS PLANNED: (Benefits and precautions of physical therapy have been discussed with the patient.)  1. Balance Exercise  2. Bed Mobility  3. Family Education  4. Gait Training  5. Therapeutic Activites  6. Therapeutic Exercise/Strengthening  7. Transfer Training  8. Group Therapy     TREATMENT PLAN: Frequency/Duration: 3 times a week for duration of hospital stay  Rehabilitation Potential For Stated Goals: Good     RECOMMENDED REHABILITATION/EQUIPMENT: (at time of discharge pending progress): Due to the probability of continued deficits (see above) this patient will likely need continued skilled physical therapy after discharge. Equipment:    None at this time              HISTORY:   History of Present Injury/Illness (Reason for Referral):  Per H&P: \"   HPI:  Deana Stevens is a 80 y.o. female with past medical history of CAD, CHF, depression, HTN, hypothyroidism, and PUD who presented to the ER from [facility] with complaints of weakness and shortness of breath. Patient reports decreased appetite over the past several days. EKG done at urgent care center showed atrial fibrillation with RVR and she was sent to the ER for further evaluation. Upon arrival to the ER, repeat EKG confirmed AFIB with RVR with rate of 130. CXR shows worsening cardiomegaly with pulmonary edema with BNP of 351. While in the ER, she was given 10mg IV Cardizem bolus x 2 followed by drip and 40 mg IV Lasix.  After Lasix administration, wayne was placed with good urine output. \"  Past Medical History/Comorbidities:   Ms. [employer]  has a past medical history of Arthritis; CAD (coronary artery disease); CHF (congestive heart failure) ([address] Depression; Hypertension; Hypothyroidism; Neuropathy; and PUD (peptic ulcer disease). Ms. [employer]  has a past surgical history that includes pr cardiac surg procedure unlist; hx appendectomy; hx cholecystectomy; hx orthopaedic; hx heent; and hx heart catheterization. Social History/Living Environment:   Home Environment: Private residence  # Steps to Enter: 0  Rails to Enter: Yes  Wheelchair Ramp: Yes  One/Two Story Residence: One story  Living Alone: Yes  Support Systems: Child(ulisses)  Patient Expects to be Discharged to[de-identified] Private residence  Current DME Used/Available at Home: Ania Handy, rollator, Grab bars, Shower chair  Tub or Shower Type: Shower  Prior Level of Function/Work/Activity:  Pt lives alone in a one story house with ramp to enter. Independent with ADLs and uses rollator for ambulation. Number of Personal Factors/Comorbidities that affect the Plan of Care:  fall 3+: HIGH COMPLEXITY   EXAMINATION:   Most Recent Physical Functioning:   Gross Assessment:                  Posture:  Posture (WDL): Exceptions to WDL  Posture Assessment: Kyphosis, Forward head, Rounded shoulders  Balance:  Sitting: Intact  Standing: Impaired  Standing - Static: Constant support  Standing - Dynamic : Poor Bed Mobility:  Supine to Sit: Supervision  Sit to Supine: Supervision  Scooting: Supervision  Duration: 16 Minutes  Wheelchair Mobility:     Transfers:  Sit to Stand: Stand-by assistance; Additional time  Stand to Sit: Stand-by assistance  Gait:     Speed/Priya: Slow  Step Length: Right shortened;Left shortened  Gait Abnormalities: Step to gait  Distance (ft): 10 Feet (ft) (x2)  Assistive Device: Walker, rolling  Ambulation - Level of Assistance: Contact guard assistance      Body Structures Involved:  1. Heart  2. Muscles Body Functions Affected:  1. Cardio  2. Neuromusculoskeletal  3. Movement Related Activities and Participation Affected:  1. Mobility  2. Domestic Life  3. Community, Social and [place]   Number of elements that affect the Plan of Care: 4+: HIGH COMPLEXITY   CLINICAL PRESENTATION:   Presentation: Stable and uncomplicated: LOW COMPLEXITY   CLINICAL DECISION MAKIN [facility] Mobility Inpatient Short Form  How much difficulty does the patient currently have. .. Unable A Lot A Little None   1. Turning over in bed (including adjusting bedclothes, sheets and blankets)? [] 1   [] 2   [] 3   [x] 4   2. Sitting down on and standing up from a chair with arms ( e.g., wheelchair, bedside commode, etc.)   [] 1   [] 2   [x] 3   [] 4   3. Moving from lying on back to sitting on the side of the bed? [] 1   [] 2   [] 3   [x] 4   How much help from another person does the patient currently need. .. Total A Lot A Little None   4. Moving to and from a bed to a chair (including a wheelchair)? [] 1   [] 2   [x] 3   [] 4   5. Need to walk in hospital room? [] 1   [] 2   [x] 3   [] 4   6. Climbing 3-5 steps with a railing? [] 1   [] 2   [x] 3   [] 4   © , Trustees of [address] [place] [address], under license to "[employer]". All rights reserved      Score:  Initial: 20 Most Recent: X (Date: -- )    Interpretation of Tool:  Represents activities that are increasingly more difficult (i.e. Bed mobility, Transfers, Gait). Score 24 23 22-20 19-15 14-10 9-7 6     Modifier CH CI CJ CK CL CM CN      ?  Mobility - Walking and Moving Around:     - CURRENT STATUS: CJ - 20%-39% impaired, limited or restricted    - GOAL STATUS: CI - 1%-19% impaired, limited or restricted    - D/C STATUS:  ---------------To be determined---------------  [place] / Product Type: Medicare /      Medical Necessity:     · Patient demonstrates good rehab potential due to higher previous functional level. Reason for Services/Other Comments:  · Patient continues to require skilled intervention due to decreased functional mobility and balance. Use of outcome tool(s) and clinical judgement create a POC that gives a: Clear prediction of patient's progress: LOW COMPLEXITY            TREATMENT:   (In addition to Assessment/Re-Assessment sessions the following treatments were rendered)   Pre-treatment Symptoms/Complaints:  Patient is very talkative and performs all therapeutic activity slowly. Pain: Initial:   Pain Intensity 1: 5  Pain Location 1: Knee  Post Session:  Unchanged. Therapeutic Activity: (16 Minutes   ):  Therapeutic activities including Bed transfers,  toilet transfers, standing balance activities in restroom, and Ambulation on level ground to improve mobility, strength, balance, coordination and dynamic movement of leg - bilateral to improve functional dynamic mobility. Required minimal cueing   to promote motor control of bilateral, lower extremity(s). Date:  4/3/18 Date:   Date:     Activity/Exercise Parameters Parameters Parameters   Long arc quads x20 AB     Hip flexion in sit x20 AB     Ankle pumps x20 AB                               Braces/Orthotics/Lines/Etc:   · Eagle monitor  Treatment/Session Assessment:    · Response to Treatment:  See above. · Interdisciplinary Collaboration:   o Physical Therapist  o Registered Nurse  · After treatment position/precautions:   o Supine in bed  o Bed alarm/tab alert on  o Bed/Chair-wheels locked  o Bed in low position  o Call light within reach  o Family at bedside   · Compliance with Program/Exercises: Will assess as treatment progresses. · Recommendations/Intent for next treatment session: \"Next visit will focus on advancements to more challenging activities and reduction in assistance provided\".   Total Treatment Duration:  PT Patient Time In/Time Out  Time In: 1041  Time Out: [address], PT, DPT

## 2018-04-04 NOTE — ANESTHESIA PREPROCEDURE EVALUATION
Anesthetic History               Review of Systems / Medical History  Patient summary reviewed, nursing notes reviewed and pertinent labs reviewed    Pulmonary                   Neuro/Psych         Psychiatric history     Cardiovascular    Hypertension: well controlled        Dysrhythmias : atrial fibrillation  CAD and cardiac stents    Exercise tolerance: <4 METS     GI/Hepatic/Renal           PUD     Endo/Other      Hypothyroidism  Arthritis     Other Findings              Physical Exam    Airway  Mallampati: I  TM Distance: > 6 cm  Neck ROM: normal range of motion   Mouth opening: Normal     Cardiovascular    Rhythm: irregular  Rate: abnormal         Dental    Dentition: Full lower dentures and Full upper dentures     Pulmonary  Breath sounds clear to auscultation               Abdominal  GI exam deferred       Other Findings            Anesthetic Plan    ASA: 4  Anesthesia type: total IV anesthesia            Anesthetic plan and risks discussed with: Patient and Family

## 2018-04-04 NOTE — PROGRESS NOTES
Notified Erwin Gentile NP regarding patient's UA of 2+ bacteria. NP stated patient is allergic to multiple antibiotics and will not treat at this moment. Will continue to monitor.

## 2018-04-04 NOTE — PROGRESS NOTES
TRANSFER - IN REPORT:    Verbal report received from JEREL Pope on Lawanda Guevara  being received from [address] [place] [address] for routine progression of care. Report consisted of patients Situation, Background, Assessment and   Recommendations(SBAR). Information from the following reports was reviewed: Kardex, Procedure Summary, MAR and Recent Results. Opportunity for questions and clarification was provided. Patient received to room 317 and connected to telemetry monitor. Assessment completed and plan of care reviewed. right groin site dry and intact without hematoma. BP cycling every 15 minutes. Patient oriented to room and call light. Patient voiced understanding to lay flat with affected leg straight. Patient aware of NPO status. Patient voiced understanding to use call light to communicate needs.

## 2018-04-04 NOTE — PROGRESS NOTES
Verbal report received from Aj Lowe RN. Reviewed kardex, mars, results, and plan of care. Patient resting comfortably in recliner, v/s/s, no c/o pain and /or discomfort, sob, n/v/d at this time. No s/s of distress noted. All safety measures in place, call bell within reach, will continue to monitor.

## 2018-04-04 NOTE — ANESTHESIA POSTPROCEDURE EVALUATION
Post-Anesthesia Evaluation and Assessment    Patient: Maddi Juarez MRN: [id number]  SSN: [id number]    YOB: 1928  Age: 80 y.o. Sex: female       Cardiovascular Function/Vital Signs  Visit Vitals    /58    Pulse 74    Temp 36.5 °C (97.7 °F)    Resp 16    Ht 5' 2\" (1.575 m)    Wt 59.2 kg (130 lb 8 oz)    SpO2 96%    Breastfeeding No    BMI 23.87 kg/m2       Patient is status post total IV anesthesia anesthesia for Procedure(s):  ULTRASOUND GUIDED CARDIOVERSION. Nausea/Vomiting: None    Postoperative hydration reviewed and adequate. Pain:  Pain Scale 1: Numeric (0 - 10) (04/04/18 1600)  Pain Intensity 1: 0 (04/04/18 1600)   Managed    Neurological Status:   Neuro  Neurologic State: Alert (04/04/18 0800)  Orientation Level: Oriented X4 (04/04/18 0800)  Cognition: Follows commands (04/04/18 0800)  Speech: Clear (04/04/18 0800)  Assessment L Pupil: Brisk;Round (03/30/18 0700)  Size L Pupil (mm): 3 (03/30/18 0700)  Assessment R Pupil: Brisk;Round (03/30/18 0700)  Size R Pupil (mm): 3 (03/30/18 0700)  LUE Motor Response: Purposeful;Spontaneous ;Weak (03/31/18 1945)  LLE Motor Response: Purposeful;Spontaneous ;Weak (03/31/18 1945)  RUE Motor Response: Purposeful;Spontaneous ;Weak (03/31/18 1945)  RLE Motor Response: Purposeful;Spontaneous ;Weak (03/31/18 1945)   At baseline    Mental Status and Level of Consciousness: Arousable    Pulmonary Status:   O2 Device: Nasal cannula (04/04/18 1600)   Adequate oxygenation and airway patent    Complications related to anesthesia: None    Post-anesthesia assessment completed.  No concerns    Signed By: Agustin Gustafson MD     April 4, 2018

## 2018-04-04 NOTE — PROGRESS NOTES
Patient returned from procedure, connected to monitor. Right groin stable no bleeding or hematoma noted.

## 2018-04-04 NOTE — PROCEDURES
Pre-Procedure Diagnosis  1. Atrial fibrilllation     Procedure Performed  1. Intracardiac echocardiography  2. Direct Current Cardioversion    Anesthesia: MAC     Specimens: * No specimens in log *     Estimated Blood Loss: None, not applicable    Procedural Description: The patient was brought to the operating suite in a fasting, nonsedated state. The risks, benefits and alternatives of the procedure were reviewed with the patient, and final questions answered. Informed consent was confirmed. A procedural timeout was called and completed per institutional policy. Once appropriate monitors were applied, procedural MAC anesthesia was induced and maintained by a staff anesthesiologist. Pt was prepped and draped in sterile fashion. Local anesthetic was delivered to the region over the right femoral vein. Access x 1 was obtained under ultrasound guidance using a modified Seldinger technique with placement of a short sheath into the right femoral vein. An 8F Accnav ICE catheter was inserted into the 8Fr sheath and used to image the NEY. There was no evidence of spontaneous echo contrast or thrombus. Once an appropriate level of sedation was achieved, the patient was converted to sinus rhythm with pads across the anterior and posterior chest wall. The patient awoke from his procedure without overt complications. Post Procedure Diagnosis: Normal sinus rhythm    Michele Haynes MD, MS  Clinical Cardiac Electrophysiology  4/4/2018  3:47 PM

## 2018-04-04 NOTE — PROGRESS NOTES
Verbal bedside report received from [facility]. Assumed care of patient.

## 2018-04-05 ENCOUNTER — APPOINTMENT (OUTPATIENT)
Dept: GENERAL RADIOLOGY | Age: 83
DRG: 308 | End: 2018-04-05
Attending: INTERNAL MEDICINE
Payer: MEDICARE

## 2018-04-05 VITALS
RESPIRATION RATE: 16 BRPM | HEIGHT: 62 IN | SYSTOLIC BLOOD PRESSURE: 114 MMHG | BODY MASS INDEX: 21.99 KG/M2 | OXYGEN SATURATION: 96 % | WEIGHT: 119.5 LBS | HEART RATE: 75 BPM | TEMPERATURE: 97.8 F | DIASTOLIC BLOOD PRESSURE: 63 MMHG

## 2018-04-05 LAB
ANION GAP SERPL CALC-SCNC: 8 MMOL/L (ref 7–16)
BUN SERPL-MCNC: 25 MG/DL (ref 8–23)
CALCIUM SERPL-MCNC: 9.4 MG/DL (ref 8.3–10.4)
CHLORIDE SERPL-SCNC: 91 MMOL/L (ref 98–107)
CO2 SERPL-SCNC: 33 MMOL/L (ref 21–32)
CREAT SERPL-MCNC: 0.64 MG/DL (ref 0.6–1)
GLUCOSE SERPL-MCNC: 98 MG/DL (ref 65–100)
MAGNESIUM SERPL-MCNC: 2 MG/DL (ref 1.8–2.4)
POTASSIUM SERPL-SCNC: 3.8 MMOL/L (ref 3.5–5.1)
SODIUM SERPL-SCNC: 132 MMOL/L (ref 136–145)

## 2018-04-05 PROCEDURE — 74011250637 HC RX REV CODE- 250/637: Performed by: INTERNAL MEDICINE

## 2018-04-05 PROCEDURE — 74011250637 HC RX REV CODE- 250/637: Performed by: NURSE PRACTITIONER

## 2018-04-05 PROCEDURE — 74220 X-RAY XM ESOPHAGUS 1CNTRST: CPT

## 2018-04-05 PROCEDURE — 74011000258 HC RX REV CODE- 258: Performed by: INTERNAL MEDICINE

## 2018-04-05 PROCEDURE — 80048 BASIC METABOLIC PNL TOTAL CA: CPT | Performed by: NURSE PRACTITIONER

## 2018-04-05 PROCEDURE — 83735 ASSAY OF MAGNESIUM: CPT | Performed by: NURSE PRACTITIONER

## 2018-04-05 PROCEDURE — 36415 COLL VENOUS BLD VENIPUNCTURE: CPT | Performed by: NURSE PRACTITIONER

## 2018-04-05 PROCEDURE — 74011250636 HC RX REV CODE- 250/636: Performed by: INTERNAL MEDICINE

## 2018-04-05 PROCEDURE — 74011000255 HC RX REV CODE- 255: Performed by: INTERNAL MEDICINE

## 2018-04-05 PROCEDURE — 74011250637 HC RX REV CODE- 250/637: Performed by: PHYSICIAN ASSISTANT

## 2018-04-05 RX ORDER — NITROFURANTOIN MACROCRYSTALS 50 MG/1
50 CAPSULE ORAL EVERY 6 HOURS
Qty: 20 CAP | Refills: 0 | Status: SHIPPED | OUTPATIENT
Start: 2018-04-05 | End: 2018-04-10

## 2018-04-05 RX ORDER — LISINOPRIL 2.5 MG/1
2.5 TABLET ORAL DAILY
Qty: 30 TAB | Refills: 6 | Status: SHIPPED | OUTPATIENT
Start: 2018-04-06

## 2018-04-05 RX ORDER — NITROFURANTOIN MACROCRYSTALS 50 MG/1
50 CAPSULE ORAL EVERY 6 HOURS
Status: DISCONTINUED | OUTPATIENT
Start: 2018-04-05 | End: 2018-04-05 | Stop reason: HOSPADM

## 2018-04-05 RX ORDER — LISINOPRIL 5 MG/1
2.5 TABLET ORAL DAILY
Status: DISCONTINUED | OUTPATIENT
Start: 2018-04-05 | End: 2018-04-05 | Stop reason: HOSPADM

## 2018-04-05 RX ORDER — AMIODARONE HYDROCHLORIDE 200 MG/1
200 TABLET ORAL 2 TIMES DAILY
Qty: 60 TAB | Refills: 3 | Status: SHIPPED | OUTPATIENT
Start: 2018-04-05 | End: 2020-10-07

## 2018-04-05 RX ORDER — AMIODARONE HYDROCHLORIDE 200 MG/1
400 TABLET ORAL 2 TIMES DAILY
Status: DISCONTINUED | OUTPATIENT
Start: 2018-04-05 | End: 2018-04-05

## 2018-04-05 RX ORDER — CARVEDILOL 6.25 MG/1
6.25 TABLET ORAL 2 TIMES DAILY WITH MEALS
Status: DISCONTINUED | OUTPATIENT
Start: 2018-04-05 | End: 2018-04-05 | Stop reason: HOSPADM

## 2018-04-05 RX ORDER — FENTANYL 25 UG/1
1 PATCH TRANSDERMAL
Status: DISCONTINUED | OUTPATIENT
Start: 2018-04-05 | End: 2018-04-05 | Stop reason: HOSPADM

## 2018-04-05 RX ORDER — AMIODARONE HYDROCHLORIDE 200 MG/1
200 TABLET ORAL 2 TIMES DAILY
Status: DISCONTINUED | OUTPATIENT
Start: 2018-04-05 | End: 2018-04-05 | Stop reason: HOSPADM

## 2018-04-05 RX ORDER — FUROSEMIDE 40 MG/1
40 TABLET ORAL 2 TIMES DAILY
Qty: 60 TAB | Refills: 6 | Status: SHIPPED | OUTPATIENT
Start: 2018-04-05 | End: 2020-10-07

## 2018-04-05 RX ADMIN — SPIRONOLACTONE 25 MG: 25 TABLET, FILM COATED ORAL at 08:39

## 2018-04-05 RX ADMIN — DULOXETINE HYDROCHLORIDE 60 MG: 60 CAPSULE, DELAYED RELEASE ORAL at 08:39

## 2018-04-05 RX ADMIN — LISINOPRIL 2.5 MG: 5 TABLET ORAL at 08:38

## 2018-04-05 RX ADMIN — PRAVASTATIN SODIUM 80 MG: 80 TABLET ORAL at 08:39

## 2018-04-05 RX ADMIN — AMIODARONE HYDROCHLORIDE 0.5 MG/MIN: 50 INJECTION, SOLUTION INTRAVENOUS at 04:03

## 2018-04-05 RX ADMIN — ASPIRIN 81 MG: 81 TABLET, COATED ORAL at 08:38

## 2018-04-05 RX ADMIN — AMIODARONE HYDROCHLORIDE 400 MG: 200 TABLET ORAL at 08:40

## 2018-04-05 RX ADMIN — LEVOTHYROXINE SODIUM 50 MCG: 50 TABLET ORAL at 07:20

## 2018-04-05 RX ADMIN — NITROFURANTOIN MACROCRYSTALS 50 MG: 50 CAPSULE ORAL at 12:50

## 2018-04-05 RX ADMIN — PANTOPRAZOLE SODIUM 40 MG: 40 TABLET, DELAYED RELEASE ORAL at 07:20

## 2018-04-05 RX ADMIN — SUCRALFATE 1 G: 1 TABLET ORAL at 16:22

## 2018-04-05 RX ADMIN — WITCH HAZEL 1 PAD: 500 SOLUTION RECTAL; TOPICAL at 01:07

## 2018-04-05 RX ADMIN — DIPHENHYDRAMINE HYDROCHLORIDE 50 MG: 25 CAPSULE ORAL at 02:36

## 2018-04-05 RX ADMIN — CARVEDILOL 6.25 MG: 6.25 TABLET, FILM COATED ORAL at 08:40

## 2018-04-05 RX ADMIN — FENOFIBRATE 160 MG: 160 TABLET ORAL at 08:39

## 2018-04-05 RX ADMIN — SUCRALFATE 1 G: 1 TABLET ORAL at 08:39

## 2018-04-05 RX ADMIN — BARIUM SULFATE 150 ML: 0.6 SUSPENSION ORAL at 12:20

## 2018-04-05 RX ADMIN — LORAZEPAM 0.5 MG: 0.5 TABLET ORAL at 09:08

## 2018-04-05 RX ADMIN — HYDROCODONE BITARTRATE AND ACETAMINOPHEN 1 TABLET: 5; 325 TABLET ORAL at 04:46

## 2018-04-05 RX ADMIN — FUROSEMIDE 40 MG: 40 TABLET ORAL at 08:40

## 2018-04-05 RX ADMIN — HYDROCODONE BITARTRATE AND ACETAMINOPHEN 1 TABLET: 5; 325 TABLET ORAL at 13:40

## 2018-04-05 RX ADMIN — FUROSEMIDE 40 MG: 40 TABLET ORAL at 16:22

## 2018-04-05 RX ADMIN — APIXABAN 2.5 MG: 2.5 TABLET, FILM COATED ORAL at 08:39

## 2018-04-05 NOTE — PROGRESS NOTES
4/5/2018 2:35 PM    Admit Date: 3/29/2018    Admit Diagnosis: At[facility] fibrillation with [facility] A Fib;A-TYPICAL ATRIAL F*      Subjective:   No cp or sob- in nsr      Objective:      Visit Vitals    /63 (BP 1 Location: Right arm, BP Patient Position: At rest)    Pulse 75    Temp 97.8 °F (36.6 °C)    Resp 16    Ht 5' 2\" (1.575 m)    Wt 54.2 kg (119 lb 8 oz)    SpO2 96%    Breastfeeding No    BMI 21.86 kg/m2       Physical Exam:  [facility], Well Nourished, No Acute Distress, Alert & Oriented x 3, appropriate mood. Neck- supple, no JVD  CV- regular rate and rhythm no MRG  Lung- clear bilaterally  Abd- soft, nontender, nondistended  Ext- no edema bilaterally. Skin- warm and dry        Data Review:   Recent Labs      04/05/18   0353   NA  132*   K  3.8   BUN  25*   CREA  0.64       Assessment/Plan:     Principal Problem:    Atrial fibrillation with RVR (HCC) (3/29/2018)Improved with current therapy. Will continue medications  Change to po amio- barium swallow- hopefully to NH soon    Active Problems:    HLD (hyperlipidemia) (9/2/2016)      HTN (hypertension) (9/2/2016)Stable. Continue current medical therapy.       CAD (coronary artery disease) ()      CHF (congestive heart failure) ([facility] (3/29/2018)

## 2018-04-05 NOTE — PROGRESS NOTES
Care Management Interventions  PCP Verified by CM: Yes  Palliative Care Criteria Met (RRAT>21 & CHF Dx)?: Yes  Mode of Transport at Discharge: BLS [facility])  Transition of Care Consult (CM Consult): SNF [facility])  Partner SNF: No  Reason Why Partner SNF Not Chosen: Location  Physical Therapy Consult: Yes  Occupational Therapy Consult: Yes  Current Support Network: Own Home, Family Lives Nearby  Confirm Follow Up Transport: Family  Plan discussed with Pt/Family/Caregiver: Yes  Freedom of Choice Offered: Yes  Discharge Location  Discharge Placement: Skilled nursing facility  Pt for D/C to [facility] via [facility]. RN updated Son Basia Friend by phone to [employer].

## 2018-04-05 NOTE — PROGRESS NOTES
TRANSFER - OUT REPORT:    Verbal report given to Renetta Wilkes RN on Adam Ovalle being transferred to [facility] for routine progression of care       Report consisted of patients Situation, Background, Assessment and Recommendations (SBAR). Information from the following report(s) SBAR, Kardex, [facility] and Recent Results was reviewed with the receiving nurse. Opportunity for questions and clarification was provided.

## 2018-04-05 NOTE — PROGRESS NOTES
Notified by Dr. Laquita Jim to call radiology to ensure patient's barium swallow is to be read. Patient's discharge is pending. Radiology called and notified.

## 2018-04-05 NOTE — PROGRESS NOTES
Problem: Self Care Deficits Care Plan (Adult)  Goal: *Acute Goals and Plan of Care (Insert Text)  1. Patient will complete lower body bathing and dressing with minimal assistance and adaptive equipment as needed. 2. Patient will complete toileting with minimal assistance . 3. Patient will tolerate 25 minutes of OT treatment with 2-3 rest breaks to increase activity tolerance for ADLs. 4. Patient will complete functional transfers with supervision and adaptive equipment as needed. 5. Patient will complete functional mobility with appropriate safety and supervision for household distances. Timeframe: 7 visits       OCCUPATIONAL THERAPY: Daily Note, Treatment Day: 1st and PM 4/5/2018  INPATIENT: Hospital Day: 8  [place] / [place] / Product Type: Medicare /      NAME/AGE/GENDER: Ramon Lima is a 80 y.o. female   PRIMARY DIAGNOSIS:  Atrial fibrillation with RVR (HCC)  A Fib  A-TYPICAL ATRIAL FLUTTER Atrial fibrillation with RVR (HCC) Atrial fibrillation with RVR (HCC)  Procedure(s) (LRB):  ULTRASOUND GUIDED CARDIOVERSION (N/A)  1 Day Post-Op  ICD-10: Treatment Diagnosis:    · Generalized Muscle Weakness (M62.81)  · Other lack of cordination (R27.8)  · History of falling (Z91.81)   Precautions/Allergies:     Keflex [cephalexin]; Pcn [penicillins]; and Sulfa (sulfonamide antibiotics)      ASSESSMENT:     Ms. Klaudia Acevedo presents to the hospital with a-fib with RVR. Pt was sitting up in the chair upon arrival. Pt verbalizes that she is nervous and demonstrates extremely anxious behavior. Pt reports that she lives alone. Pt does have hx of falls and unable to recall if she had therapy in the past. Pt states she had a recent CTS surgery in the L UE and has multiple deformities of the hands and hx of arthritis. Pt presents with overall generalized weakness. Pt requires minimal assistance and additional time to stand from chair.  Pt needed time to adjust LEs with hx of injury to L knee. Pt completed functional mobility with walker in room and needed assurance throughout. Pt required minimal assistance and cues to hold head at midline (forward flexion at the neck). Pt left up working with PT. Pt currently appears fairly frail and debilitated impacting safety and independence with ADL. Pt states normally she is able to complete basic ADL, but I imagine it is with difficulty. Pt appears anxious about therapy working with her to get her back in shape to go home. Pt is currently functioning below baseline and will benefit from OT services to address stated goals and plan of care. This section established at most recent assessment   PROBLEM LIST (Impairments causing functional limitations):  1. Decreased Strength  2. Decreased ADL/Functional Activities  3. Decreased Transfer Abilities  4. Decreased Ambulation Ability/Technique  5. Decreased Balance  6. Increased Pain  7. Decreased Activity Tolerance  8. Decreased Flexibility/Joint Mobility  9. Decreased Skin Integrity/Hygeine  10. Decreased Crane with Home Exercise Program  11. Decreased Cognition   INTERVENTIONS PLANNED: (Benefits and precautions of occupational therapy have been discussed with the patient.)  1. Activities of daily living training  2. Adaptive equipment training  3. Balance training  4. Clothing management  5. Cognitive training  6. Donning&doffing training  7. Group therapy  8. Neuromuscular re-eduation  9. Therapeutic activity  10. Therapeutic exercise     TREATMENT PLAN: Frequency/Duration: Follow patient 3 times per week to address above goals. Rehabilitation Potential For Stated Goals: Good     RECOMMENDED REHABILITATION/EQUIPMENT: (at time of discharge pending progress): Due to the probability of continued deficits (see above) this patient will likely need continued skilled occupational therapy after discharge.   Equipment:    TBD              OCCUPATIONAL PROFILE AND HISTORY:   History of Present Injury/Illness (Reason for Referral):  See H&P  Past Medical History/Comorbidities:   Ms. Natividad Trujillo  has a past medical history of Arthritis; CAD (coronary artery disease); CHF (congestive heart failure) ([facility] [address] Depression; Hypertension; Hypothyroidism; Neuropathy; and PUD (peptic ulcer disease). Ms. Natividad Trujillo  has a past surgical history that includes pr cardiac surg procedure unlist; hx appendectomy; hx cholecystectomy; hx orthopaedic; hx heent; and hx heart catheterization. Social History/Living Environment:   Home Environment: Private residence  # Steps to Enter: 0  Rails to Enter: Yes  Wheelchair Ramp: Yes  One/Two Story Residence: One story  Living Alone: Yes  Support Systems: Child(ulisses)  Patient Expects to be Discharged to[de-identified] Private residence  Current DME Used/Available at Home: Elveria Kartik, rollator, Grab bars, Shower chair  Tub or Shower Type: Shower  Prior Level of Function/Work/Activity:  Pt lives at home alone. Pt reports family brings her meals. Pt typically completes ADL/functional mobility with assistive device. Pt has rollator, shower chair, and grab bars. Personal Factors:          Age:  80 y.o. Social Background:  Live alone with hx of falls        Overall Behavior:  Anxious        Other factors that influence how disability is experienced by the patient:  Multiple co-morbidities    Number of Personal Factors/Comorbidities that affect the Plan of Care: Extensive review of physical, cognitive, and psychosocial performance (3+):  HIGH COMPLEXITY   ASSESSMENT OF OCCUPATIONAL PERFORMANCE[de-identified]   Activities of Daily Living:           Basic ADLs (From Assessment) Complex ADLs (From Assessment)   Basic ADL  Feeding: Supervision  Oral Facial Hygiene/Grooming: Minimum assistance  Bathing: Moderate assistance  Type of Bath: Chlorhexidine (CHG)  Upper Body Dressing: Moderate assistance  Lower Body Dressing: Maximum assistance  Toileting:  Moderate assistance Instrumental ADL  Meal Preparation: Maximum assistance  Homemaking: Total assistance   Grooming/Bathing/Dressing Activities of Daily Living                                     Most Recent Physical Functioning:   Gross Assessment:                  Posture:  Posture (WDL): Exceptions to WDL  Posture Assessment: Kyphosis, Forward head, Rounded shoulders  Balance:    Bed Mobility:     Wheelchair Mobility:     Transfers:                 Patient Vitals for the past 6 hrs:   BP BP Patient Position SpO2 Pulse   18 1245 114/63 At rest 96 % 75       Mental Status  Neurologic State: Alert  Orientation Level: Oriented X4  Cognition: Follows commands  Perception: Appears intact  Perseveration: Perseverates during conversation  Safety/Judgement: Fall prevention                          Physical Skills Involved:  1. Range of Motion  2. Balance  3. Strength  4. Activity Tolerance  5. Fine Motor Control  6. Gross Motor Control  7. Pain (Chronic)  8. Edema  9. Skin Integrity Cognitive Skills Affected (resulting in the inability to perform in a timely and safe manner):  1. Executive Function  2. Divided Attention Psychosocial Skills Affected:  1. Habits/Routines  2. Environmental Adaptation  3. Self-Awareness   Number of elements that affect the Plan of Care: 5+:  HIGH COMPLEXITY   CLINICAL DECISION MAKIN [facility] [address] [id number] AM-PAC 6 Clicks   Daily Activity Inpatient Short Form  How much help from another person does the patient currently need. .. Total A Lot A Little None   1. Putting on and taking off regular lower body clothing? [] 1   [x] 2   [] 3   [] 4   2. Bathing (including washing, rinsing, drying)? [] 1   [x] 2   [] 3   [] 4   3. Toileting, which includes using toilet, bedpan or urinal?   [] 1   [x] 2   [] 3   [] 4   4. Putting on and taking off regular upper body clothing? [] 1   [x] 2   [] 3   [] 4   5. Taking care of personal grooming such as brushing teeth? [] 1   [] 2   [x] 3   [] 4   6. Eating meals?    [] 1   [] 2   [x] 3   [] 4   © 2007, Trustees of [employer], under license to [employer]. All rights reserved      Score:  Initial: 14 Most Recent: X (Date: -- )    Interpretation of Tool:  Represents activities that are increasingly more difficult (i.e. Bed mobility, Transfers, Gait). Score 24 23 22-20 19-15 14-10 9-7 6     Modifier CH CI CJ CK CL CM CN      ? Self Care:     - CURRENT STATUS: CL - 60%-79% impaired, limited or restricted    - GOAL STATUS: CK - 40%-59% impaired, limited or restricted    - D/C STATUS:  ---------------To be determined---------------  [place] / [place] / Product Type: Medicare /      Medical Necessity:     · Patient demonstrates good rehab potential due to higher previous functional level. Reason for Services/Other Comments:  · Patient continues to require skilled intervention due to decreased independence with ADL/functional transfers. Use of outcome tool(s) and clinical judgement create a POC that gives a: MODERATE COMPLEXITY         TREATMENT:   (In addition to Assessment/Re-Assessment sessions the following treatments were rendered)     Pre-treatment Symptoms/Complaints:  \"I just feel so weak in my legs. I think I'm going to rehab today. \"  Pain: Initial:   Pain Intensity 1: 0  Post Session:  0/10     Self Care: (16 min): Procedure(s) (per grid) utilized to improve and/or restore self-care/home management as related to toileting and grooming. Required minimal verbal and   cueing to facilitate activities of daily living skills. Pt completed toileting with CGA and toileting hygiene with set-up. Pt able to wash hands and face with supervision, as well as wash teeth with supervision. Therapeutic Activity: (   10 min): Therapeutic activities including Chair transfers, Ambulation on slight incline  to improve mobility, strength and balance. Required minimal   to promote dynamic balance in standing. Pt performed sit to stand transfer from chair with CGA.  Pt able to ambulate ~15 ft from chair to hallway with CGA and RW. Pt returned to upright in chair with needs met. Braces/Orthotics/Lines/Etc:   · IV  · O2 Device: Nasal cannula  Treatment/Session Assessment:    · Response to Treatment:  Evaluation only. · Interdisciplinary Collaboration:   o Occupational Therapist  o Registered Nurse  o Certified Nursing Assistant/Patient Care Technician  · After treatment position/precautions:   o Up in chair  o Bed alarm/tab alert on  o Bed/Chair-wheels locked  o Call light within reach  o PCT at bedside   · Compliance with Program/Exercises: Will assess as treatment progresses. · Recommendations/Intent for next treatment session: \"Next visit will focus on advancements to more challenging activities and reduction in assistance provided\".   Total Treatment Duration:  OT Patient Time In/Time Out  Time In: 1510  Time Out: 1400 [facility],

## 2018-04-05 NOTE — PROGRESS NOTES
Called patient's son Basia Friend [phone] and informed patient cleared by  and [facility] Cardiology to go to rehab today. Informed of transport time 1600. Son verbalizes understanding.

## 2018-04-05 NOTE — PROGRESS NOTES
Bedside and Verbal shift change report given to [facility], [facility], RN (oncoming nurse) by self (offgoing nurse). Report included the following information SBAR, Kardex, MAR and Recent Results.

## 2018-04-05 NOTE — PROGRESS NOTES
Pt request for home dose of fentanyl patch. Notified Dr. Gannon Left. Orders received to order home dose.

## 2018-04-05 NOTE — PROGRESS NOTES
Verbal bedside report given to [facility] JHA PARK, oncoming RN. Patient's situation, background, assessment and recommendations provided. Opportunity for questions provided. Oncoming RN assumed care of patient.

## 2018-04-05 NOTE — CONSULTS
[facility] Consult Note       Primary GI Physician: Dr. Melquiades Madden, formerly    Referring Physician:  Arpan Vásquez NP    Consult Date:  4/5/2018    Admit Date:  3/29/2018    Chief Complaint:  Dysphagia    Subjective:     History of Present Illness:  Patient is a 80 y.o. female with PMH of (but not limited to) CAD, CHF with chronic systolic dysfunction with echocardiogram 3/29/18 revealing 20-25% EF, HTN, Hypothyroid, PUD, who is seen in consultation at the request of JIN Ramos NP for dysphagia and abnormal BS. She was admitted on 3/29/18 with A fib and possible exacerbation of her CHF. She was started on Cardizem drip and underwent cardioversion on 4/4/18(according to cardiology, unable to hold Eliquis for one month) She is on Eliquis and Plavix for CAD with history of stents several years ago. She has been off Plavix since her admission, but has remained on Eliquis. She has rare dysphagia to \"large pills\" but denies any dysphagia to solids or liquids. She denies any GERD, epigastric abdominal pain,N&V, weight loss or melena. She underwent a Barium swallow due to inability to pass a PABLITO scope. BS revealed results as below. She is on Protonix and carafate. She is ready for discharge to NH. EGD on 5/25/06 by Dr. Melquiades Madden revealed ring at the GE junction dilated with #48 Fr Sellbox NORTH dilator, diffuse gastritis. Large amount of bile retention, nonhealing gastric ulcer in the antrum. Pathology revealed focal acute gastritis. Colonoscopy on 5/25/06 revealed internal hemorrhoids, diverticulosis. ESOPHAGRAM.4/5/18     HISTORY:  Esophageal stricture. Unable to pass transesophageal echo pediatric  scope.     TECHNIQUE:  Single contrast examination was performed utilizing 250 cc thin  barium solution.     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 The  patient had difficulty swallowing large boluses.  Remainder the esophagus was  widely patent all there is a corkscrew type esophageal spasm and presbyesophagus  with nonpropulsive tertiary contractions. Moderate-sized hiatal hernia.     Tablet was not given as the patient had difficulty swallowing large boluses of  water and for fear that it would hang up at the lower cervical stricture.     Fluoroscopy time:  4.4 minutes. Static images: 92.     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 There is a small  type I sliding hiatal hernia, delayed esophageal emptying presbyesophagus,  esophageal spasm and gastroesophageal reflux. PMH:  Past Medical History:   Diagnosis Date    Arthritis     CAD (coronary artery disease)     CHF (congestive heart failure) (HCC)     chronic systolic dysfunction    Depression     Hypertension     Hypothyroidism     Neuropathy     PUD (peptic ulcer disease)        PSH:  Past Surgical History:   Procedure Laterality Date    CARDIAC SURG PROCEDURE UNLIST      stent x 2    HX APPENDECTOMY      HX CHOLECYSTECTOMY      HX HEART CATHETERIZATION      HX HEENT      HX ORTHOPAEDIC         Allergies: Allergies   Allergen Reactions    Keflex [Cephalexin] Other (comments)    Pcn [Penicillins] Rash    Sulfa (Sulfonamide Antibiotics) Rash       Home Medications:  Prior to Admission medications    Medication Sig Start Date End Date Taking? Authorizing Provider   amiodarone (CORDARONE) 200 mg tablet Take 1 Tab by mouth two (2) times a day. Take 200 mg twice a day for 14 days then decrease to once a day . 4/5/18  Yes Frank Merrill MD   apixaban (ELIQUIS) 2.5 mg tablet Take 1 Tab by mouth two (2) times a day. 4/5/18  Yes Frank Merrill MD   furosemide (LASIX) 40 mg tablet Take 1 Tab by mouth two (2) times a day. 4/5/18  Yes Frank Merrill MD   lisinopril (PRINIVIL, ZESTRIL) 2.5 mg tablet Take 1 Tab by mouth daily.  4/6/18  Yes Frank Merrill MD   nitrofurantoin CITLALLI BUI [facility]) 50 mg capsule Take 1 Cap by mouth every six (6) hours for 5 days. 4/5/18 4/10/18 Yes Ousmane Martinez MD   HYDROcodone-acetaminophen [facility]) 7.5-325 mg per tablet Take 1 Tab by mouth every six (6) hours as needed for Pain. Yes Historical Provider   gabapentin (NEURONTIN) 400 mg capsule Take 400 mg by mouth three (3) times daily. Yes Historical Provider   aspirin delayed-release 81 mg tablet Take 81 mg by mouth daily. Yes Historical Provider   carvedilol (COREG) 6.25 mg tablet Take 3.125 mg by mouth two (2) times daily (with meals). Yes Historical Provider   cholecalciferol, vitamin D3, (VITAMIN D3) 2,000 unit tab Take  by mouth. Yes Historical Provider   fenofibrate micronized (LOFIBRA) 134 mg capsule Take  by mouth every morning. Yes Historical Provider   fluticasone (FLONASE) 50 mcg/actuation nasal spray 2 Sprays by Both Nostrils route daily. Yes Historical Provider   omeprazole (PRILOSEC) 20 mg capsule Take 20 mg by mouth two (2) times a day. Yes Historical Provider   oxymetazoline (AFRIN) 0.05 % nasal spray 2 Sprays two (2) times a day. Yes Historical Provider   polyethylene glycol (MIRALAX) 17 gram packet Take 17 g by mouth daily. Yes Historical Provider   sucralfate (CARAFATE) 1 gram tablet Take 1 g by mouth four (4) times daily. Yes Historical Provider   fentaNYL (DURAGESIC) 25 mcg/hr PATCH 1 Patch by TransDERmal route every seventy-two (72) hours. Yes aMrky Ledesma MD   iron, carbonyl (FEOSOL) 45 mg tab Take 1 Tab by mouth daily. Yes Marky Ledesma MD   multivitamin (ONE A DAY) tablet Take 1 Tab by mouth daily. Yes Marky Ledesma MD   calcium-cholecalciferol, d3, (CALCIUM 600 + D) 600-125 mg-unit tab Take 500 mg by mouth. Indications: HYPOCALCEMIA   Yes Historical Provider   PRAVASTATIN 80 mg tablet take 80 mg by mouth daily. Yes Marky Ledesma MD   LORAZEPAM 1 mg tablet Take 1 mg by mouth every eight (8) hours as needed for Anxiety.    Yes Marky Ledesma MD   CYMBALTA 60 mg CpDR take 60 mg by mouth daily. Yes Marky Ledesma MD   PLAVIX 75 mg Tab take 75 mg by mouth daily. Yes Marky Ledesma MD   DIGOXIN 0.125 mg tablet take 125 mcg by mouth daily. Yes Marky Ledesma MD   SYNTHROID 50 mcg Tab take 50 mcg by mouth daily. Yes Marky Ledesma MD   IMITREX 100 mg Tab take 100 mg by mouth once as needed for Migraine. Yes Marky Ledesma MD   nitroglycerin (NITROSTAT) 0.4 mg SL tablet 1 Tab by SubLINGual route every five (5) minutes as needed for Chest Pain. 7/15/16   Mary Jo Medina MD   spironolactone (ALDACTONE) 50 mg tablet Take 50 mg by mouth daily. Marky Ledesma MD   oxyCODONE-acetaminophen (PERCOCET)  mg per tablet Take 1 Tab by mouth every six (6) hours as needed for Pain. 5/30/14   Gemma Madsen MD   NITROFURANTOIN 100 mg Cap Take  by mouth two (2) times a day.     Marky Ledesma MD       Hospital Medications:  Current Facility-Administered Medications   Medication Dose Route Frequency    witch hazel-glycerin (TUCKS) 12.5-50 % pads 1 Pad  1 Pad PeriANAL PRN    carvedilol (COREG) tablet 6.25 mg  6.25 mg Oral BID WITH MEALS    lisinopril (PRINIVIL, ZESTRIL) tablet 2.5 mg  2.5 mg Oral DAILY    fentaNYL (DURAGESIC) 25 mcg/hr patch 1 Patch  1 Patch TransDERmal Q72H    barium Sulfate (E-Z-HD) 98 % contrast susp 135 mL  135 mL Oral RAD ONCE    barium sulfate (E-Z-DISK) contrast tablet 700 mg  700 mg Oral RAD ONCE    sod bicarb-citric ac-simeth (EZ GAS II) 2.21-1.53 gram/4 gram contrast packet 4 g  4 g Oral RAD ONCE    amiodarone (CORDARONE) tablet 200 mg  200 mg Oral BID    nitrofurantoin (MACRODANTIN) capsule 50 mg  50 mg Oral Q6H    apixaban (ELIQUIS) tablet 2.5 mg  2.5 mg Oral BID    spironolactone (ALDACTONE) tablet 25 mg  25 mg Oral DAILY    HYDROcodone-acetaminophen (NORCO) 5-325 mg per tablet 1 Tab  1 Tab Oral Q4H PRN    ondansetron (ZOFRAN) injection 4 mg  4 mg IntraVENous Q6H PRN    diphenhydrAMINE (BENADRYL) capsule 50 mg  50 mg Oral QHS PRN    furosemide (LASIX) tablet 40 mg  40 mg Oral ACB&D    LORazepam (ATIVAN) tablet 0.5 mg  0.5 mg Oral Q6H PRN    aspirin delayed-release tablet 81 mg  81 mg Oral DAILY    DULoxetine (CYMBALTA) capsule 60 mg  60 mg Oral DAILY    fenofibrate (LOFIBRA) tablet 160 mg  160 mg Oral DAILY    pantoprazole (PROTONIX) tablet 40 mg  40 mg Oral ACB    polyethylene glycol (MIRALAX) packet 17 g  17 g Oral DAILY    pravastatin (PRAVACHOL) tablet 80 mg  80 mg Oral DAILY    sucralfate (CARAFATE) tablet 1 g  1 g Oral AC&HS    levothyroxine (SYNTHROID) tablet 50 mcg  50 mcg Oral ACB    NUTRITIONAL SUPPORT ELECTROLYTE PRN ORDERS   Does Not Apply PRN    lip protectant (BLISTEX) ointment   Topical PRN       Social History:  Social History   Substance Use Topics    Smoking status: Never Smoker    Smokeless tobacco: Not on file    Alcohol use No       Family History:  Family History   Problem Relation Age of Onset    Hypertension Other        Review of Systems:  A detailed 10 system ROS is obtained, with pertinent positives as listed above. All others are negative. Diet:  Cardiac diet    Objective:     Physical Exam:  Vitals:  Visit Vitals    /63 (BP 1 Location: Right arm, BP Patient Position: At rest)    Pulse 75    Temp 97.8 °F (36.6 °C)    Resp 16    Ht 5' 2\" (1.575 m)    Wt 54.2 kg (119 lb 8 oz)    SpO2 96%    Breastfeeding No    BMI 21.86 kg/m2     Gen:  Pt is alert, cooperative, no acute distress SITTING UP IN A CHAIR EATING A TURKEY SANDWICH AND PEARS WITHOUT DIFFICULTY  Skin:  Extremities and face reveal no rashes. HEENT: Sclerae anicteric. Extra-occular muscles are intact. No oral ulcers. No abnormal pigmentation of the lips. The neck is supple. Cardiovascular: Regular rate and REGULAR RHYTHM. Respiratory:  Comfortable breathing with no accessory muscle use. Clear breath sounds anteriorly with no wheezes, rales, or rhonchi. GI:  Abdomen nondistended, soft, and nontender. Normal active bowel sounds.  No enlargement of the liver or spleen. No masses palpable. Rectal:  Deferred  Musculoskeletal:  No pitting edema of the lower legs. Neurological:  Gross memory appears intact. Patient is alert and oriented. Psychiatric:  Mood appears appropriate with judgement intact. Lymphatic:  No cervical or supraclavicular adenopathy. Laboratory:    Recent Labs      04/05/18   0353  04/04/18   0417  04/03/18   0425   NA  132*  133*  134*   K  3.8  3.9  3.8   CL  91*  91*  92*   CO2  33*  32  32   BUN  25*  27*  24*   CREA  0.64  0.56*  0.57*   CA  9.4  9.8  9.2   MG  2.0  1.7*  1.7*   GLU  98  89  96          Assessment:     Principal Problem:    Atrial fibrillation with RVR (HCC) (3/29/2018)    Active Problems:    HLD (hyperlipidemia) (9/2/2016)      HTN (hypertension) (9/2/2016)      CAD (coronary artery disease) ()      CHF (congestive heart failure) ([facility] [address] (3/29/2018)    80 y.o. female with PMH of (but not limited to) CAD, CHF with chronic systolic dysfunction with echocardiogram 3/29/18 revealing 20-25% EF, HTN, Hypothyroid, PUD, who is seen in consultation at the request of JIN Leach NP for dysphagia and abnormal BS. She was admitted on 3/29/18 with A fib and possible exacerbation of her CHF. She was started on Cardizem drip and underwent cardioversion on 4/4/18(according to cardiology, unable to hold Eliquis for one month). 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 There is a small type I sliding hiatal hernia, delayed esophageal emptying presbyesophagus,esophageal spasm and gastroesophageal reflux. She has minimal symptoms. Plan:     1.OK to discharge as planned  2. She will need to be on GI soft diet, ground meat until we are able to perform EGD/dil  3. We will make her an appt for follow up with us 6-8 weeks.  We will schedule her for outpatient EGD/dilation once we can hold her Eliquis (not for one month per Cardiology due to recent cardioversion). 4.Reinterated careful mastication    Joana De Santiago. [place], [address]  [facility]    Patient is seen and examined in collaboration with Dr. Hitesh Jimenez. Assessment and plan as per Dr. Hitesh Jimenez.

## 2018-04-05 NOTE — DISCHARGE INSTRUCTIONS
Deciding Between Electrical Cardioversion and Rate Control Medicines for Atrial Fibrillation  Deciding Between Electrical Cardioversion and Rate Control Medicines for Atrial Fibrillation    What is atrial fibrillation? Atrial fibrillation (say \"CÉSAR-tree-anastasia mmi-iulk-TVL-shun\") is a kind of uneven heartbeat. It can make you feel lightheaded and dizzy. You may feel weak. It also can make you more likely to have a stroke. Electrical cardioversion can return your heart to a normal rhythm. First you'll get medicines to make you sleepy and control pain. Then your doctor will use patches to send an electric current to your heart. This resets the rhythm of your heart. Not everyone with atrial fibrillation needs this treatment. For some people, taking medicines may be better. Most people can live with an uneven heartbeat. It just has to be kept under control so the heart does not beat too fast.  Use this information to help you and your doctor decide which treatment to choose for atrial fibrillation. What are boudreaux points about this decision? · Electrical cardioversion can return your heart to a normal rhythm. But the problem can come back. The longer you have had atrial fibrillation, the more likely it is to come back after this treatment. · Cardioversion may not work as well when an uneven heartbeat is caused by another heart disease, such as heart failure. · If your symptoms bother you a lot, you may want to try cardioversion. But even if it works, you may still need to take blood thinners to prevent a stroke. · If you don't have symptoms, or if they don't bother you much, you can try medicines to slow your heart rate. And you can take blood thinners to prevent a stroke. · Cardioversion does have risks, such as stroke. Discuss the risks with your doctor. Make sure you understand them. · You may have more than one heart problem. Cardioversion doesn't work as well if you have more than one heart problem.   Why might you choose electrical cardioversion? · It restores the normal heart rhythm for most people. · The idea of having an electric shock does not bother you. · Your symptoms bother you a lot. · You have had atrial fibrillation just one time. · You do not have other heart problems. · You may not have to take as many medicines. Or you may not need to take them as long. Why might you choose rate-control medicines? · These medicines keep many people from having symptoms. · You prefer to take medicines rather than have an electric shock. · Your symptoms don't bother you much. · If these medicines don't work, you can still try electrical cardioversion. Your decision  Thinking about the facts and your feelings can help you make a decision that is right for you. Be sure you understand the benefits and risks of your options. And think about what else you need to do before you make the decision. Where can you learn more? Go to http://leo-shmuel.info/. Enter L873 in the search box to learn more about \"Deciding Between Electrical Cardioversion and Rate Control Medicines for Atrial Fibrillation. \"  Current as of: September 21, 2016  Content Version: 11.4  © 0349-8398 [employer]. Care instructions adapted under license by [employer] (which disclaims liability or warranty for this information). If you have questions about a medical condition or this instruction, always ask your healthcare professional. [address] any warranty or liability for your use of this information.                      DISCHARGE SUMMARY from Nurse    PATIENT INSTRUCTIONS:    After general anesthesia or intravenous sedation, for 24 hours or while taking prescription Narcotics:  · Limit your activities  · Do not drive and operate hazardous machinery  · Do not make important personal or business decisions  · Do  not drink alcoholic beverages  · If you have not urinated within 8 hours after discharge, please contact your surgeon on call. Report the following to your surgeon:  · Excessive pain, swelling, redness or odor of or around the surgical area  · Temperature over 100.5  · Nausea and vomiting lasting longer than 4 hours or if unable to take medications  · Any signs of decreased circulation or nerve impairment to extremity: change in color, persistent  numbness, tingling, coldness or increase pain  · Any questions    What to do at Home:  *  Please give a list of your current medications to your Primary Care Provider. *  Please update this list whenever your medications are discontinued, doses are      changed, or new medications (including over-the-counter products) are added. *  Please carry medication information at all times in case of emergency situations. These are general instructions for a healthy lifestyle:    No smoking/ No tobacco products/ Avoid exposure to second hand smoke  Surgeon General's Warning:  Quitting smoking now greatly reduces serious risk to your health. Obesity, smoking, and sedentary lifestyle greatly increases your risk for illness    A healthy diet, regular physical exercise & weight monitoring are important for maintaining a healthy lifestyle    You may be retaining fluid if you have a history of heart failure or if you experience any of the following symptoms:  Weight gain of 3 pounds or more overnight or 5 pounds in a week, increased swelling in our hands or feet or shortness of breath while lying flat in bed. Please call your doctor as soon as you notice any of these symptoms; do not wait until your next office visit. Recognize signs and symptoms of STROKE:    F-face looks uneven    A-arms unable to move or move unevenly    S-speech slurred or non-existent    T-time-call 911 as soon as signs and symptoms begin-DO NOT go       Back to bed or wait to see if you get better-TIME IS BRAIN.     Warning Signs of HEART ATTACK     Call 911 if you have these symptoms:   Chest discomfort. Most heart attacks involve discomfort in the center of the chest that lasts more than a few minutes, or that goes away and comes back. It can feel like uncomfortable pressure, squeezing, fullness, or pain.  Discomfort in other areas of the upper body. Symptoms can include pain or discomfort in one or both arms, the back, neck, jaw, or stomach.  Shortness of breath with or without chest discomfort.  Other signs may include breaking out in a cold sweat, nausea, or lightheadedness. Don't wait more than five minutes to call 911 - MINUTES MATTER! Fast action can save your life. Calling 911 is almost always the fastest way to get lifesaving treatment. Emergency Medical Services staff can begin treatment when they arrive -- up to an hour sooner than if someone gets to the hospital by car. The discharge information has been reviewed with the patient. The patient verbalized understanding. Discharge medications reviewed with the patient and appropriate educational materials and side effects teaching were provided.   ___________________________________________________________________________________________________________________________________

## 2018-04-05 NOTE — PROGRESS NOTES
Marilin Alyssa Cochran will need extra water and/or fluids to avoid constipation due to barium study today

## 2018-04-05 NOTE — PROGRESS NOTES
Paged Dr. Cailin Franco regarding patient's complaint of pain 8/10, last norco given at 0446, order is for norco q 4.   Per Dr. Cailin Franco, RN is to wait and address patient's pain with day team.

## 2018-04-05 NOTE — PROGRESS NOTES
Bedside and Verbal shift change report given to self (oncoming nurse) by Stephanie Beltrán RN (offgoing nurse). Report included the following information SBAR, Kardex, MAR and Recent Results.

## 2018-04-05 NOTE — DISCHARGE SUMMARY
Physician Discharge Summary     Patient ID:  Flor Reid  [id number]  35 y.o.  12/28/1928    Admit date: 3/29/2018    Discharge date and time: 4/5/18    Admitting Physician: Mare Mcdonald MD     Primary Cardiologist:Dr. Vergil Rinne    Primary Care MD:Ezekiel Gunter MD    Discharge Physician: Meghan Whelan NP    Admission Diagnoses: Atrial fibrillation with RVR ([facility] [address]  A Fib  A-TYPICAL ATRIAL FLUTTER    Discharge Diagnoses:   Patient Active Problem List    Diagnosis Date Noted    Atrial fibrillation with RVR ([facility] [address] 03/29/2018    CHF (congestive heart failure) ([facility] [address] 03/29/2018    HLD (hyperlipidemia) 09/02/2016    HTN (hypertension) 09/02/2016    CAD (coronary artery disease)            Hospital Course: 81 yo female with known hx of HTN, CAD, SHF who presented with afib with RVR. She was admitted for further evaluation and management of her afib with RVR. She was placed on IV cardizem infusion. Subsequently on 4/4 she had PABLITO/ Cardioversion to Normal sinus rhythm. She was placed on amiodarone and eliquis for treatment of Afib. During her PABLITO it was noted difficulty passing probe and swallowing difficulty. Barium swallow study today performed noting small esophageal stricture. She was seen by GI associates with recommendations for soft mechanical diet and ground meat. Pt will continue amiodarone load 200 mg BID for two weeks then decrease to 200 mg daily. She will be transferred to rehab facility to improve her strength and mobility. She was placed on macrodantin for UTI. Her plavix was discontinued with newly Rx eliquis. It was discussed with patient the importance of oral anticoagulation, to take this every day and monitor stools for signs and symptoms of GI bleed. Report to us or PCP any dark tarry stools or huy blood in stool.                    Discharge Exam:     Visit Vitals    /65 (BP 1 Location: Right arm, BP Patient Position: Sitting)    Pulse 75    Temp 98.3 °F (36.8 °C)    Resp 16    Ht 5' 2\" (1.575 m)    Wt 54.2 kg (119 lb 8 oz)    SpO2 95%    Breastfeeding No    BMI 21.86 kg/m2     General Appearance:  Well developed, well nourished,alert and oriented x 3, and individual in no acute distress. Ears/Nose/Mouth/Throat:   Hearing grossly normal.         Neck: Supple. Chest:   Lungs clear to auscultation bilaterally. Cardiovascular:  Regular rate and rhythm, S1, S2 normal, no murmur. Abdomen:   Soft, non-tender, bowel sounds are active. Extremities: No edema bilaterally. Skin: Warm and dry.                Final Laboratory Data:  Recent Results (from the past 24 hour(s))   URINALYSIS W/ RFLX MICROSCOPIC    Collection Time: 04/04/18  5:19 PM   Result Value Ref Range    Color YELLOW      Appearance CLOUDY      Specific gravity 1.012 1.001 - 1.023      pH (UA) 8.0 5.0 - 9.0      Protein NEGATIVE  NEG mg/dL    Glucose NEGATIVE  mg/dL    Ketone NEGATIVE  NEG mg/dL    Bilirubin NEGATIVE  NEG      Blood NEGATIVE  NEG      Urobilinogen 0.2 0.2 - 1.0 EU/dL    Nitrites NEGATIVE  NEG      Leukocyte Esterase LARGE (A) NEG      WBC >100 (H) 0 /hpf    RBC 0-3 0 /hpf    Epithelial cells 0 0 /hpf    Bacteria 2+ (H) 0 /hpf    Casts 0-3 0 /lpf   METABOLIC PANEL, BASIC    Collection Time: 04/05/18  3:53 AM   Result Value Ref Range    Sodium 132 (L) 136 - 145 mmol/L    Potassium 3.8 3.5 - 5.1 mmol/L    Chloride 91 (L) 98 - 107 mmol/L    CO2 33 (H) 21 - 32 mmol/L    Anion gap 8 7 - 16 mmol/L    Glucose 98 65 - 100 mg/dL    BUN 25 (H) 8 - 23 MG/DL    Creatinine 0.64 0.6 - 1.0 MG/DL    GFR est AA >60 >60 ml/min/1.73m2    GFR est non-AA >60 >60 ml/min/1.73m2    Calcium 9.4 8.3 - 10.4 MG/DL   MAGNESIUM    Collection Time: 04/05/18  3:53 AM   Result Value Ref Range    Magnesium 2.0 1.8 - 2.4 mg/dL       Disposition: home    Patient Instructions:   Current Discharge Medication List      START taking these medications    Details   amiodarone (CORDARONE) 200 mg tablet Take 1 Tab by mouth two (2) times a day. Take 200 mg twice a day for 14 days then decrease to once a day . Qty: 60 Tab, Refills: 3      apixaban (ELIQUIS) 2.5 mg tablet Take 1 Tab by mouth two (2) times a day. Qty: 60 Tab, Refills: 6      furosemide (LASIX) 40 mg tablet Take 1 Tab by mouth two (2) times a day. Qty: 60 Tab, Refills: 6      lisinopril (PRINIVIL, ZESTRIL) 2.5 mg tablet Take 1 Tab by mouth daily. Qty: 30 Tab, Refills: 6      nitrofurantoin (MACRODANTIN) 50 mg capsule Take 1 Cap by mouth every six (6) hours for 5 days. Qty: 20 Cap, Refills: 0         CONTINUE these medications which have NOT CHANGED    Details   gabapentin (NEURONTIN) 400 mg capsule Take 400 mg by mouth three (3) times daily. aspirin delayed-release 81 mg tablet Take 81 mg by mouth daily. carvedilol (COREG) 6.25 mg tablet Take 3.125 mg by mouth two (2) times daily (with meals). cholecalciferol, vitamin D3, (VITAMIN D3) 2,000 unit tab Take  by mouth. fenofibrate micronized (LOFIBRA) 134 mg capsule Take  by mouth every morning. fluticasone (FLONASE) 50 mcg/actuation nasal spray 2 Sprays by Both Nostrils route daily. omeprazole (PRILOSEC) 20 mg capsule Take 20 mg by mouth two (2) times a day. oxymetazoline (AFRIN) 0.05 % nasal spray 2 Sprays two (2) times a day. polyethylene glycol (MIRALAX) 17 gram packet Take 17 g by mouth daily. sucralfate (CARAFATE) 1 gram tablet Take 1 g by mouth four (4) times daily. fentaNYL (DURAGESIC) 25 mcg/hr PATCH 1 Patch by TransDERmal route every seventy-two (72) hours. iron, carbonyl (FEOSOL) 45 mg tab Take 1 Tab by mouth daily. multivitamin (ONE A DAY) tablet Take 1 Tab by mouth daily. calcium-cholecalciferol, d3, (CALCIUM 600 + D) 600-125 mg-unit tab Take 500 mg by mouth. Indications: HYPOCALCEMIA      PRAVASTATIN 80 mg tablet take 80 mg by mouth daily. CYMBALTA 60 mg CpDR take 60 mg by mouth daily. SYNTHROID 50 mcg Tab take 50 mcg by mouth daily. IMITREX 100 mg Tab take 100 mg by mouth once as needed for Migraine. nitroglycerin (NITROSTAT) 0.4 mg SL tablet 1 Tab by SubLINGual route every five (5) minutes as needed for Chest Pain. Qty: 25 Tab, Refills: 6      spironolactone (ALDACTONE) 50 mg tablet Take 50 mg by mouth daily. NITROFURANTOIN 100 mg Cap Take  by mouth two (2) times a day. STOP taking these medications       HYDROcodone-acetaminophen (NORCO) 7.5-325 mg per tablet Comments:   Reason for Stopping:         LORAZEPAM 1 mg tablet Comments:   Reason for Stopping:         PLAVIX 75 mg Tab Comments:   Reason for Stopping:         DIGOXIN 0.125 mg tablet Comments:   Reason for Stopping:         oxyCODONE-acetaminophen (PERCOCET)  mg per tablet Comments:   Reason for Stopping:                 Referenced discharge instructions provided by nursing for diet and activity. Follow-up:  Primary Cardiologist:Dr. Zakiya Ramirez in one week  PCP: Ming Smalls MD) in about 4 weeks.     Signed:  Danelle Arguelles NP  4/5/2018  10:10 AM

## 2018-04-05 NOTE — PROGRESS NOTES
Cardiac Rehab: Referral received for diagnosis of CHF. Cardiac rehab participation is appropriate for those with stable, chronic heart failure defined as patients with left ventricular ejection fraction of 35% or less and New York Heart Association (NYHA) class II to IV symptoms despite being on optimal heart failure therapy for at least six weeks. Stable patients are defined as pateints who have not had recent (6 weeks or less) or planned (6 months or less) major cardiovascular hospitalizations or procedures. Patient's EF20-25%, NYHA class (unknown), CHF is chronic(yes). Spoke with patient and she is planning to be discharged to [address] [place] [address]. She states she has done cardiac rehab in the past at [facility]. She is unsure if she wants to do cardiac rehab again and would have to depend on son for transportation. Pt instructed to discuss with MD upon discharge from Rehab facility. Pt voices understanding.

## 2018-04-16 ENCOUNTER — HOSPITAL ENCOUNTER (OUTPATIENT)
Dept: LAB | Age: 83
Discharge: HOME | End: 2018-04-16

## 2018-04-16 LAB
APPEARANCE UR: ABNORMAL
BACTERIA URNS QL MICRO: ABNORMAL /HPF
BILIRUB UR QL: NEGATIVE
COLOR UR: YELLOW
EPI CELLS #/AREA URNS HPF: ABNORMAL /HPF
GLUCOSE UR STRIP.AUTO-MCNC: NEGATIVE MG/DL
HGB UR QL STRIP: ABNORMAL
KETONES UR QL STRIP.AUTO: NEGATIVE MG/DL
LEUKOCYTE ESTERASE UR QL STRIP.AUTO: ABNORMAL
NITRITE UR QL STRIP.AUTO: NEGATIVE
OTHER OBSERVATIONS,UCOM: ABNORMAL
PH UR STRIP: 6.5 [PH] (ref 5–9)
PROT UR STRIP-MCNC: ABNORMAL MG/DL
RBC #/AREA URNS HPF: ABNORMAL /HPF
SP GR UR REFRACTOMETRY: 1 (ref 1–1.02)
UROBILINOGEN UR QL STRIP.AUTO: 1 EU/DL (ref 0.2–1)
WBC URNS QL MICRO: >100 /HPF

## 2018-04-16 PROCEDURE — 81001 URINALYSIS AUTO W/SCOPE: CPT | Performed by: NURSE PRACTITIONER

## 2018-05-31 ENCOUNTER — ANESTHESIA EVENT (OUTPATIENT)
Dept: ENDOSCOPY | Age: 83
End: 2018-05-31
Payer: MEDICARE

## 2018-05-31 ENCOUNTER — HOSPITAL ENCOUNTER (OUTPATIENT)
Age: 83
Setting detail: OUTPATIENT SURGERY
Discharge: HOME | End: 2018-05-31
Attending: INTERNAL MEDICINE | Admitting: INTERNAL MEDICINE
Payer: MEDICARE

## 2018-05-31 ENCOUNTER — ANESTHESIA (OUTPATIENT)
Dept: ENDOSCOPY | Age: 83
End: 2018-05-31
Payer: MEDICARE

## 2018-05-31 VITALS
HEIGHT: 62 IN | DIASTOLIC BLOOD PRESSURE: 60 MMHG | TEMPERATURE: 97.7 F | BODY MASS INDEX: 23 KG/M2 | RESPIRATION RATE: 16 BRPM | WEIGHT: 125 LBS | HEART RATE: 74 BPM | OXYGEN SATURATION: 96 % | SYSTOLIC BLOOD PRESSURE: 146 MMHG

## 2018-05-31 PROCEDURE — 74011000250 HC RX REV CODE- 250

## 2018-05-31 PROCEDURE — 76060000031 HC ANESTHESIA FIRST 0.5 HR: Performed by: INTERNAL MEDICINE

## 2018-05-31 PROCEDURE — 74011250636 HC RX REV CODE- 250/636: Performed by: INTERNAL MEDICINE

## 2018-05-31 PROCEDURE — 76040000025: Performed by: INTERNAL MEDICINE

## 2018-05-31 PROCEDURE — 74011250636 HC RX REV CODE- 250/636

## 2018-05-31 RX ORDER — LIDOCAINE HYDROCHLORIDE 20 MG/ML
INJECTION, SOLUTION EPIDURAL; INFILTRATION; INTRACAUDAL; PERINEURAL AS NEEDED
Status: DISCONTINUED | OUTPATIENT
Start: 2018-05-31 | End: 2018-05-31 | Stop reason: HOSPADM

## 2018-05-31 RX ORDER — PROPOFOL 10 MG/ML
INJECTION, EMULSION INTRAVENOUS
Status: DISCONTINUED | OUTPATIENT
Start: 2018-05-31 | End: 2018-05-31 | Stop reason: HOSPADM

## 2018-05-31 RX ORDER — PROPOFOL 10 MG/ML
INJECTION, EMULSION INTRAVENOUS AS NEEDED
Status: DISCONTINUED | OUTPATIENT
Start: 2018-05-31 | End: 2018-05-31 | Stop reason: HOSPADM

## 2018-05-31 RX ORDER — SODIUM CHLORIDE, SODIUM LACTATE, POTASSIUM CHLORIDE, CALCIUM CHLORIDE 600; 310; 30; 20 MG/100ML; MG/100ML; MG/100ML; MG/100ML
1000 INJECTION, SOLUTION INTRAVENOUS CONTINUOUS
Status: DISCONTINUED | OUTPATIENT
Start: 2018-05-31 | End: 2018-05-31 | Stop reason: HOSPADM

## 2018-05-31 RX ADMIN — PROPOFOL 30 MG: 10 INJECTION, EMULSION INTRAVENOUS at 11:54

## 2018-05-31 RX ADMIN — PROPOFOL 10 MG: 10 INJECTION, EMULSION INTRAVENOUS at 11:56

## 2018-05-31 RX ADMIN — LIDOCAINE HYDROCHLORIDE 40 MG: 20 INJECTION, SOLUTION EPIDURAL; INFILTRATION; INTRACAUDAL; PERINEURAL at 11:54

## 2018-05-31 RX ADMIN — PROPOFOL 100 MCG/KG/MIN: 10 INJECTION, EMULSION INTRAVENOUS at 11:54

## 2018-05-31 RX ADMIN — SODIUM CHLORIDE, SODIUM LACTATE, POTASSIUM CHLORIDE, AND CALCIUM CHLORIDE 1000 ML: 600; 310; 30; 20 INJECTION, SOLUTION INTRAVENOUS at 10:41

## 2018-05-31 NOTE — DISCHARGE INSTRUCTIONS
Gastrointestinal Esophagogastroduodenoscopy (EGD) - Upper Exam Discharge Instructions    1. Call Dr. Carlos A Maxwell at [phone] for any problems or questions. 2. Contact the doctor's office for follow up appointment as directed. 3. Medication may cause drowsiness for several hours, therefore, do not drive or operate machinery for remainder of the day. 4. No alcohol today. 5. Ordinarily, you may resume regular diet and activity after exam unless otherwise specified by your physician. 6. For mild soreness in your throat you may use Cepacol throat lozenges or warm salt-water gargles as needed. Any additional instructions:    Soft foods today. Repeat dilation as needed. Instructions given to Hunter Retana and other family members.   Instructions given by:  Fermín Palacios RN

## 2018-05-31 NOTE — ANESTHESIA PREPROCEDURE EVALUATION
Anesthetic History               Review of Systems / Medical History  Patient summary reviewed, nursing notes reviewed and pertinent labs reviewed    Pulmonary                   Neuro/Psych         Psychiatric history     Cardiovascular    Hypertension: well controlled  Valvular problems/murmurs: mitral insufficiency and aortic stenosis      Dysrhythmias (s/p cardioversion 4/2018) : atrial fibrillation  CAD and cardiac stents    Exercise tolerance: <4 METS  Comments: Echo: 3/2018  -  Left ventricle: Systolic function was markedly reduced. Ejection fraction  was estimated in the range of 20 % to 25 %. There was severe diffuse  hypokinesis with paradoxical septal wall motion due to underlying LBBB. Wall  thickness was moderately increased. Avg. E/e'= 18.23 (grade 4 diastolic  dysfunction). Severe diastolic dysfunction with elevated LA pressure is   present. -  Right ventricle: There was mild to moderate pulmonary artery hypertension.    -  Left atrium: The atrium was moderately to markedly dilated. -  Right atrium: The atrium was moderately dilated. -  Inferior vena cava, hepatic veins: The inferior vena cava was dilated. Respirophasic changes in dimension were absent. -  Aortic valve: The valve was probably trileaflet. The mean pressure   gradient  was 10 mmHg. The findings were most consistent with very mild aortic   stenosis. -  Mitral valve: There was mild to moderate annular calcification. There was  moderate to severe regurgitation. -  Tricuspid valve: There was moderate to severe regurgitation. -  Pericardium: A trivial pericardial effusion was identified. There was no  evidence of hemodynamic compromise. There was a left pleural effusion.    GI/Hepatic/Renal           PUD     Endo/Other      Hypothyroidism  Arthritis     Other Findings            Physical Exam    Airway  Mallampati: I  TM Distance: > 6 cm  Neck ROM: normal range of motion   Mouth opening: Normal Cardiovascular    Rhythm: irregular  Rate: abnormal         Dental    Dentition: Full lower dentures and Full upper dentures     Pulmonary  Breath sounds clear to auscultation               Abdominal  GI exam deferred       Other Findings            Anesthetic Plan    ASA: 4  Anesthesia type: total IV anesthesia            Anesthetic plan and risks discussed with: Patient

## 2018-05-31 NOTE — PROCEDURES
Endoscopic Gastroduodenoscopy Procedure Note    Indications: Esophageal stricture    Anesthesia/Sedation: MAC IV     Pre-Procedure Physical:    Current Facility-Administered Medications   Medication Dose Route Frequency    lactated Ringers infusion 1,000 mL  1,000 mL IntraVENous CONTINUOUS      Keflex [cephalexin]; Pcn [penicillins]; and Sulfa (sulfonamide antibiotics)    Patient Vitals for the past 8 hrs:   BP Temp Pulse Resp SpO2 Height Weight   05/31/18 1034 133/69 - 92 18 96 % - -   05/31/18 1007 - 98.6 °F (37 °C) - - - 5' 2\" (1.575 m) 56.7 kg (125 lb)       Exam      Airway: clear   Heart: normal S1and S2    Lungs: clear bilateral  Abdomen: soft, nontender, bowel sounds present and normal in all quads   Mental Status: awake, alert and oriented to person, place and time          Procedure Details     Informed consent was obtained for the procedure, including conscious sedation. Risks of pancreatitis, infection, perforation, hemorrhage, adverse drug reaction and aspiration were discussed. The patient was placed in the left lateral decubitus position. Based on the pre-procedure assessment, including review of the patient's medical history, medications, allergies, and review of systems, she had been deemed to be an appropriate candidate for conscious sedation; she was therefore sedated with the medications listed below. She was monitored continuously with ECG tracing, pulse oximetry, blood pressure monitoring, and direct observation. The EGD gastroscope was inserted into the mouth and advanced under direct vision to the second portion of the duodenum. A careful inspection was made as the gastroscope was withdrawn, including a retroflexed view of the proximal stomach; findings and interventions are described below. Appropriate photodocumentation was obtained. Findings:   Esophagus- Tight UES stricture such that scope could not pass. Savary wire threaded through UES.   Dilated with 30, 33, and 36 Fr Savary. Moderate trauma and heme. Stomach- Normal.  Duodenum- Normal.    Therapies: Dilation    Specimens: None    Estimated Blood Loss: 0 cc           Complications:   None; patient tolerated the procedure well. Attending Attestation:  I performed the procedure. Impression:    Tight UES stricture such that scope could not pass. Savary wire threaded through UES. Dilated with 30, 33, and 36 Fr Savary. Moderate trauma and heme. Recommendations:  Can repeat dilation prn.

## 2018-05-31 NOTE — H&P
[facility] Consult Note           Referring Physician:     Consult Date: 5/31/2018    Reason for Consult: Esophageal stricture    History of Present Illness:  Patient is a 80 y.o. female who is seen in consultation for Esophageal stricture. Past Medical History:   Diagnosis Date    Arthritis     CAD (coronary artery disease)     CHF (congestive heart failure) (HCC)     chronic systolic dysfunction    Depression     Hypertension     Hypothyroidism     Neuropathy     PUD (peptic ulcer disease)       Past Surgical History:   Procedure Laterality Date    CARDIAC SURG PROCEDURE UNLIST      stent x 2    HX APPENDECTOMY      HX CHOLECYSTECTOMY      HX HEART CATHETERIZATION      HX HEENT      HX ORTHOPAEDIC      right knee replaced    HX OTHER SURGICAL      carpal tunnel      Family History   Problem Relation Age of Onset    Hypertension Other      Social History     Occupational History    Not on file. Social History Main Topics    Smoking status: Never Smoker    Smokeless tobacco: Never Used    Alcohol use No    Drug use: No    Sexual activity: Not on file       Hospital Medications:  Current Facility-Administered Medications   Medication Dose Route Frequency    lactated Ringers infusion 1,000 mL  1,000 mL IntraVENous CONTINUOUS       Allergies: Allergies   Allergen Reactions    Keflex [Cephalexin] Other (comments)    Pcn [Penicillins] Rash    Sulfa (Sulfonamide Antibiotics) Rash       Review of Systems:  A comprehensive review of systems was negative except for that written in the History of Present Illness. Objective:     Physical Exam:  Vitals:  Visit Vitals    /69    Pulse 92    Temp 98.6 °F (37 °C)    Resp 18    Ht 5' 2\" (1.575 m)    Wt 56.7 kg (125 lb)    SpO2 96%    Breastfeeding No    BMI 22.86 kg/m2       General: No acute distress. Skin:  Extremities and face reveal no rashes. No paiz erythema. No telangiectasias on the chest wall.   HEENT: Sclerae anicteric. No oral ulcers. No abnormal pigmentation of the lips. The neck is supple. Cardiovascular: Regular rate and rhythm. No murmurs, gallops, or rubs. Respiratory:  Comfortable breathing  With no accessory muscle use. Clear breath sounds with no wheezes, rales, or rhonchi. GI:  Abdomen nondistended, soft, and nontender. Normal active bowel sounds. No enlargement of the liver or spleen. No masses palpable. Musculoskeletal:  No pitting edema of the lower legs. Extremities have good range of motion. Neurological:  Gross memory appears intact. Patient is alert and oriented. Psychiatric:  Mood appears appropriate with judgement intact. Lymphatic:  No cervical or supraclavicular adenopathy. Laboratory:    No results for input(s): WBC, RBC, HGB, HCT, PLT, HGBEXT, HCTEXT, PLTEXT in the last 72 hours. No results for input(s): GLU, NA, K, CL, CO2, BUN, CREA, CA in the last 72 hours. No results for input(s): PTP, INR, APTT in the last 72 hours. No lab exists for component: INREXT  No results for input(s): TBIL, CBIL, SGOT, GPT, AP, ALB, TP, AML, LPSE in the last 72 hours.     Assessment:       A 80 y.o. female with Esophageal stricture      Plan:       EGD    Signed By: Radha Christiansen MD     May 31, 2018

## 2018-05-31 NOTE — ANESTHESIA POSTPROCEDURE EVALUATION
Post-Anesthesia Evaluation and Assessment    Patient: Ana Francis MRN: [id number]  SSN: [id number]    YOB: 1928  Age: 80 y.o. Sex: female       Cardiovascular Function/Vital Signs  Visit Vitals    /60    Pulse 74    Temp 36.5 °C (97.7 °F)    Resp 16    Ht 5' 2\" (1.575 m)    Wt 56.7 kg (125 lb)    SpO2 96%    Breastfeeding No    BMI 22.86 kg/m2       Patient is status post total IV anesthesia anesthesia for Procedure(s):  ESOPHAGOGASTRODUODENOSCOPY (EGD)/ 23  ESOPHAGEAL DILATION. Nausea/Vomiting: None    Postoperative hydration reviewed and adequate. Pain:  Pain Scale 1: Numeric (0 - 10) (05/31/18 1230)  Pain Intensity 1: 0 (05/31/18 1230)   Managed    Neurological Status: At baseline    Mental Status and Level of Consciousness: Arousable    Pulmonary Status:   O2 Device: Room air (05/31/18 1230)   Adequate oxygenation and airway patent    Complications related to anesthesia: None    Post-anesthesia assessment completed.  No concerns    Signed By: Momo Chavez MD     May 31, 2018

## 2018-05-31 NOTE — IP AVS SNAPSHOT
[address] 
[phone] Patient: Janna Eller MRN: [id number] About your hospitalization You were admitted on:  May 31, 2018 You last received care in the:  [facility] You were discharged on:  May 31, 2018 Why you were hospitalized Your primary diagnosis was:  Not on File Follow-up Information None Your Scheduled Appointments   1:15 PM EDT Office Visit with Petrona Gentile MD  
[facility] CARDIOLOGY [place] OFFICE ([address] [place] [address]) [address] [place] [address]  
[phone] Discharge Orders None A check jose angel indicates which time of day the medication should be taken. My Medications ASK your doctor about these medications Instructions Each Dose to Equal  
 Morning Noon Evening Bedtime  
 amiodarone 200 mg tablet Commonly known as:  CORDARONE Your last dose was: Your next dose is: Take 1 Tab by mouth two (2) times a day. Take 200 mg twice a day for 14 days then decrease to once a day . 200 mg  
    
   
   
   
  
 apixaban 2.5 mg tablet Commonly known as:  Margarie Azael Your last dose was: Your next dose is: Take 1 Tab by mouth two (2) times a day. 2.5 mg  
    
   
   
   
  
 aspirin delayed-release 81 mg tablet Your last dose was: Your next dose is: Take 81 mg by mouth daily. 81 mg  
    
   
   
   
  
 ATIVAN 0.5 mg tablet Generic drug:  LORazepam  
   
Your last dose was: Your next dose is: Take  by mouth every eight (8) hours as needed for Anxiety. CALCIUM 600 + D 600-125 mg-unit Tab Generic drug:  calcium-cholecalciferol (d3) Your last dose was: Your next dose is: Take 500 mg by mouth.  Indications: HYPOCALCEMIA  
 500 mg  
    
   
 CARAFATE 1 gram tablet Generic drug:  sucralfate Your last dose was: Your next dose is: Take 1 g by mouth Before breakfast, lunch, dinner and at bedtime. 1 g  
    
   
   
   
  
 COREG 6.25 mg tablet Generic drug:  carvedilol Your last dose was: Your next dose is: Take 3.125 mg by mouth two (2) times daily (with meals). 3.125 mg  
    
   
   
   
  
 CYMBALTA 60 mg capsule Generic drug:  DULoxetine Your last dose was: Your next dose is:    
   
   
 take 60 mg by mouth daily. 60 mg  
    
   
   
   
  
 fenofibrate micronized 134 mg capsule Commonly known as:  LOFIBRA Your last dose was: Your next dose is: Take  by mouth every morning. fentaNYL 25 mcg/hr PATCH Commonly known as:  Sabina Jung Your last dose was: Your next dose is:    
   
   
 1 Patch by TransDERmal route every seventy-two (72) hours. 1 Patch FLONASE 50 mcg/actuation nasal spray Generic drug:  fluticasone Your last dose was: Your next dose is: 2 Sprays by Both Nostrils route daily. 2 Spray  
    
   
   
   
  
 furosemide 40 mg tablet Commonly known as:  LASIX Your last dose was: Your next dose is: Take 1 Tab by mouth two (2) times a day. 40 mg  
    
   
   
   
  
 gabapentin 400 mg capsule Commonly known as:  NEURONTIN Your last dose was: Your next dose is: Take 400 mg by mouth three (3) times daily. 400 mg HYDROcodone-acetaminophen 7.5-325 mg per tablet Commonly known as:  Tito Ceron Your last dose was: Your next dose is: Take 1 Tab by mouth every eight (8) hours as needed for Pain. 1 Tab IMITREX 100 mg tablet Generic drug:  SUMAtriptan Your last dose was: Your next dose is:    
   
   
 take 100 mg by mouth once as needed for Migraine. 100 mg  
    
   
   
   
  
 iron, carbonyl 45 mg Tab Commonly known as:  Akshat Moura Your last dose was: Your next dose is: Take 1 Tab by mouth daily. 1 Tab  
    
   
   
   
  
 lisinopril 2.5 mg tablet Commonly known as:  Kem Allred Your last dose was: Your next dose is: Take 1 Tab by mouth daily. 2.5 mg MIRALAX 17 gram packet Generic drug:  polyethylene glycol Your last dose was: Your next dose is: Take 17 g by mouth daily. 17 g  
    
   
   
   
  
 mirtazapine 15 mg tablet Commonly known as:  Bonesarah Cody Your last dose was: Your next dose is: Take 15 mg by mouth nightly. 15 mg  
    
   
   
   
  
 MUCINEX 600 mg ER tablet Generic drug:  guaiFENesin ER Your last dose was: Your next dose is: Take 600 mg by mouth two (2) times a day. 600 mg  
    
   
   
   
  
 multivitamin tablet Commonly known as:  ONE A DAY Your last dose was: Your next dose is: Take 1 Tab by mouth daily. 1 Tab  
    
   
   
   
  
 nitroglycerin 0.4 mg SL tablet Commonly known as:  NITROSTAT Your last dose was: Your next dose is:    
   
   
 1 Tab by SubLINGual route every five (5) minutes as needed for Chest Pain. 0.4 mg  
    
   
   
   
  
 omeprazole 20 mg capsule Commonly known as:  PRILOSEC Your last dose was: Your next dose is: Take 20 mg by mouth two (2) times a day. 20 mg  
    
   
   
   
  
 oxymetazoline 0.05 % nasal spray Commonly known as:  Shelva Ape Your last dose was: Your next dose is: 2 Sprays two (2) times a day. 2 Spray  
    
   
   
   
  
 pravastatin 80 mg tablet Commonly known as:  PRAVACHOL Your last dose was: Your next dose is:    
   
   
 take 80 mg by mouth daily. 80 mg  
    
   
   
   
  
 spironolactone 50 mg tablet Commonly known as:  ALDACTONE Your last dose was: Your next dose is: Take 50 mg by mouth daily. 50 mg  
    
   
   
   
  
 synthroid 50 mcg tablet Generic drug:  levothyroxine Your last dose was: Your next dose is:    
   
   
 take 50 mcg by mouth daily. 50 mcg VITAMIN D3 2,000 unit Tab Generic drug:  cholecalciferol (vitamin D3) Your last dose was: Your next dose is: Take  by mouth. Opioid Education Prescription Opioids: What You Need to Know: 
 
Prescription opioids can be used to help relieve moderate-to-severe pain and are often prescribed following a surgery or injury, or for certain health conditions. These medications can be an important part of treatment but also come with serious risks. Opioids are strong pain medicines. Examples include hydrocodone, oxycodone, fentanyl, and morphine. Heroin is an example of an illegal opioid. It is important to work with your health care provider to make sure you are getting the safest, most effective care. WHAT ARE THE RISKS AND SIDE EFFECTS OF OPIOID USE? Prescription opioids carry serious risks of addiction and overdose, especially with prolonged use. An opioid overdose, often marked by slow breathing, can cause sudden death. The use of prescription opioids can have a number of side effects as well, even when taken as directed. · Tolerance-meaning you might need to take more of a medication for the same pain relief · Physical dependence-meaning you have symptoms of withdrawal when the medication is stopped. Withdrawal symptoms can include nausea, sweating, chills, diarrhea, stomach cramps, and muscle aches.   Withdrawal can last up to several weeks, depending on which drug you took and how long you took it. · Increased sensitivity to pain · Constipation · Nausea, vomiting, and dry mouth · Sleepiness and dizziness · Confusion · Depression · Low levels of testosterone that can result in lower sex drive, energy, and strength · Itching and sweating RISKS ARE GREATER WITH:      
· History of drug misuse, substance use disorder, or overdose · Mental health conditions (such as depression or anxiety) · Sleep apnea · Older age (72 years or older) · Pregnancy Avoid alcohol while taking prescription opioids. Also, unless specifically advised by your health care provider, medications to avoid include: · Benzodiazepines (such as Xanax or Valium) · Muscle relaxants (such as Soma or Flexeril) · Hypnotics (such as Ambien or Lunesta) · Other prescription opioids KNOW YOUR OPTIONS Talk to your health care provider about ways to manage your pain that don't involve prescription opioids. Some of these options may actually work better and have fewer risks and side effects. Options may include: 
· Pain relievers such as acetaminophen, ibuprofen, and naproxen · Some medications that are also used for depression or seizures · Physical therapy and exercise · Counseling to help patients learn how to cope better with triggers of pain and stress. · Application of heat or cold compress · Massage therapy · Relaxation techniques Be Informed Make sure you know the name of your medication, how much and how often to take it, and its potential risks & side effects. IF YOU ARE PRESCRIBED OPIOIDS FOR PAIN: 
· Never take opioids in greater amounts or more often than prescribed. Remember the goal is not to be pain-free but to manage your pain at a tolerable level. · Follow up with your primary care provider to: · Work together to create a plan on how to manage your pain. · Talk about ways to help manage your pain that don't involve prescription opioids. · Talk about any and all concerns and side effects. · Help prevent misuse and abuse. · Never sell or share prescription opioids · Help prevent misuse and abuse. · Store prescription opioids in a secure place and out of reach of others (this may include visitors, children, friends, and family). · Safely dispose of unused/unwanted prescription opioids: Find your community drug take-back program or your pharmacy mail-back program, or flush them down the toilet, following guidance from the Food and Drug Administration (www.fda.gov/Drugs/ResourcesForYou). · Visit www.cdc.gov/drugoverdose to learn about the risks of opioid abuse and overdose. · If you believe you may be struggling with addiction, tell your health care provider and ask for guidance or call [facility] at 9-726-550-YVLO. Discharge Instructions Gastrointestinal Esophagogastroduodenoscopy (EGD) - Upper Exam Discharge Instructions 1. Call Dr. Cecille Mcardle at [phone] for any problems or questions. 2. Contact the doctor's office for follow up appointment as directed. 3. Medication may cause drowsiness for several hours, therefore, do not drive or operate machinery for remainder of the day. 4. No alcohol today. 5. Ordinarily, you may resume regular diet and activity after exam unless otherwise specified by your physician. 6. For mild soreness in your throat you may use Cepacol throat lozenges or warm salt-water gargles as needed. Any additional instructions:   
Soft foods today. Repeat dilation as needed. Instructions given to Nael Christian and other family members. Instructions given by:  Matt Hall RN Introducing [facility]! Willadean Saint introduces [facility] patient portal. Now you can access parts of your medical record, email your doctor's office, and request medication refills online.    
 
1. In your internet browser, go to [email or web address] 2. Click on the First Time User? Click Here link in the Sign In box. You will see the New Member Sign Up page. 3. Enter your [employer] Access Code exactly as it appears below. You will not need to use this code after youve completed the sign-up process. If you do not sign up before the expiration date, you must request a new code. · [employer] Access Code: [email or web address] Expires: 6/27/2018  7:05 PM 
 
4. Enter the last four digits of your Social Security Number (xxxx) and Date of Birth (mm/dd/yyyy) as indicated and click Submit. You will be taken to the next sign-up page. 5. Create a [employer] ID. This will be your [employer] login ID and cannot be changed, so think of one that is secure and easy to remember. 6. Create a [employer] password. You can change your password at any time. 7. Enter your Password Reset Question and Answer. This can be used at a later time if you forget your password. 8. Enter your e-mail address. You will receive e-mail notification when new information is available in [address] 9. Click Sign Up. You can now view and download portions of your medical record. 10. Click the Download Summary menu link to download a portable copy of your medical information. If you have questions, please visit the Frequently Asked Questions section of the [employer] website. Remember, [employer] is NOT to be used for urgent needs. For medical emergencies, dial 911. Now available from your iPhone and Android! Introducing Juan Carmona As a [facility] patient, I wanted to make you aware of our electronic visit tool called Juan Carmona. [facility] 24/7 allows you to connect within minutes with a medical provider 24 hours a day, seven days a week via a mobile device or tablet or logging into a secure website from your computer. You can access Juan Carmona from anywhere in the [place]. A virtual visit might be right for you when you have a simple condition and feel like you just dont want to get out of bed, or cant get away from work for an appointment, when your regular [facility] provider is not available (evenings, weekends or holidays), or when youre out of town and need minor care. Electronic visits cost only $49 and if the [employer] 24/FRUCT provider determines a prescription is needed to treat your condition, one can be electronically transmitted to a nearby pharmacy*. Please take a moment to enroll today if you have not already done so. The enrollment process is free and takes just a few minutes. To enroll, please download the Digly stefano to your tablet or phone, or visit [email or web address] to enroll on your computer. And, as an [address] [place] [address] patient with a [employer] account, the results of your visits will be scanned into your electronic medical record and your primary care provider will be able to view the scanned results. We urge you to continue to see your regular [facility] provider for your ongoing medical care. And while your primary care provider may not be the one available when you seek a Juan Carmona virtual visit, the peace of mind you get from getting a real diagnosis real time can be priceless. For more information on Juan Carmona, view our Frequently Asked Questions (FAQs) at [email or web address]. Sincerely, 
 
Genie Dasilva MD 
Chief Medical Officer [employer] *:  certain medications cannot be prescribed via Juan Carmona Providers Seen During Your Hospitalization Provider Specialty Primary office phone Rosi Shankar MD Gastroenterology [phone] Your Primary Care Physician (PCP) Primary Care Physician Office Phone Office Fax [facility] Kevin [phone] You are allergic to the following Allergen Reactions Keflex (Cephalexin) Other (comments) Pcn (Penicillins) Rash  
    
 Sulfa (Sulfonamide Antibiotics) Rash Recent Documentation Height Weight Breastfeeding? BMI OB Status Smoking Status 1.575 m 56.7 kg No 22.86 kg/m2 Postmenopausal Never Smoker Emergency Contacts Name Discharge Info Relation Home Work Mobile Rondall Escort  Child [2] [phone] Tamela Mckeon  Child [2] [phone] Patient Belongings The following personal items are in your possession at time of discharge: 
  Dental Appliances: Lowers, Uppers  Visual Aid: None   Hearing Aids/Status: Does not own Please provide this summary of care documentation to your next provider. Signatures-by signing, you are acknowledging that this After Visit Summary has been reviewed with you and you have received a copy. Patient Signature:  ____________________________________________________________ Date:  ____________________________________________________________  
  
Juliana Nelson Provider Signature:  ____________________________________________________________ Date:  ____________________________________________________________

## 2018-06-28 PROBLEM — Z79.01 ANTICOAGULANT LONG-TERM USE: Status: ACTIVE | Noted: 2018-06-28

## 2018-07-04 LAB
BASOPHILS # BLD: 0 K/UL (ref 0–0.2)
BASOPHILS NFR BLD: 0 % (ref 0–2)
DIFFERENTIAL METHOD BLD: ABNORMAL
EOSINOPHIL # BLD: 0.1 K/UL (ref 0–0.8)
EOSINOPHIL NFR BLD: 2 % (ref 0.5–7.8)
ERYTHROCYTE [DISTWIDTH] IN BLOOD BY AUTOMATED COUNT: 13.4 % (ref 11.9–14.6)
HCT VFR BLD AUTO: 35.9 % (ref 35.8–46.3)
HGB BLD-MCNC: 12 G/DL (ref 11.7–15.4)
IMM GRANULOCYTES # BLD: 0 K/UL (ref 0–0.5)
IMM GRANULOCYTES NFR BLD AUTO: 0 % (ref 0–5)
LYMPHOCYTES # BLD: 1.6 K/UL (ref 0.5–4.6)
LYMPHOCYTES NFR BLD: 21 % (ref 13–44)
MCH RBC QN AUTO: 30.8 PG (ref 26.1–32.9)
MCHC RBC AUTO-ENTMCNC: 33.4 G/DL (ref 31.4–35)
MCV RBC AUTO: 92.1 FL (ref 79.6–97.8)
MONOCYTES # BLD: 0.7 K/UL (ref 0.1–1.3)
MONOCYTES NFR BLD: 9 % (ref 4–12)
NEUTS SEG # BLD: 5.1 K/UL (ref 1.7–8.2)
NEUTS SEG NFR BLD: 68 % (ref 43–78)
PLATELET # BLD AUTO: 393 K/UL (ref 150–450)
PMV BLD AUTO: 10.9 FL (ref 10.8–14.1)
RBC # BLD AUTO: 3.9 M/UL (ref 4.05–5.25)
WBC # BLD AUTO: 7.5 K/UL (ref 4.3–11.1)

## 2018-07-10 ENCOUNTER — APPOINTMENT (RX ONLY)
Dept: URBAN - METROPOLITAN AREA CLINIC 23 | Facility: CLINIC | Age: 83
Setting detail: DERMATOLOGY
End: 2018-07-10

## 2018-07-10 DIAGNOSIS — L73.8 OTHER SPECIFIED FOLLICULAR DISORDERS: ICD-10-CM

## 2018-07-10 DIAGNOSIS — L57.0 ACTINIC KERATOSIS: ICD-10-CM

## 2018-07-10 DIAGNOSIS — L72.8 OTHER FOLLICULAR CYSTS OF THE SKIN AND SUBCUTANEOUS TISSUE: ICD-10-CM

## 2018-07-10 DIAGNOSIS — L98.8 OTHER SPECIFIED DISORDERS OF THE SKIN AND SUBCUTANEOUS TISSUE: ICD-10-CM

## 2018-07-10 PROBLEM — E03.9 HYPOTHYROIDISM, UNSPECIFIED: Status: ACTIVE | Noted: 2018-07-10

## 2018-07-10 PROBLEM — I10 ESSENTIAL (PRIMARY) HYPERTENSION: Status: ACTIVE | Noted: 2018-07-10

## 2018-07-10 PROBLEM — F41.9 ANXIETY DISORDER, UNSPECIFIED: Status: ACTIVE | Noted: 2018-07-10

## 2018-07-10 PROCEDURE — 99213 OFFICE O/P EST LOW 20 MIN: CPT

## 2018-07-10 PROCEDURE — ? TREATMENT REGIMEN

## 2018-07-10 PROCEDURE — ? OTHER

## 2018-07-10 PROCEDURE — ? COUNSELING

## 2018-07-10 ASSESSMENT — LOCATION DETAILED DESCRIPTION DERM
LOCATION DETAILED: INFERIOR LUMBAR SPINE
LOCATION DETAILED: LEFT CENTRAL MALAR CHEEK
LOCATION DETAILED: NASAL DORSUM
LOCATION DETAILED: RIGHT DISTAL POSTERIOR THIGH

## 2018-07-10 ASSESSMENT — LOCATION SIMPLE DESCRIPTION DERM
LOCATION SIMPLE: LEFT CHEEK
LOCATION SIMPLE: LOWER BACK
LOCATION SIMPLE: NOSE
LOCATION SIMPLE: RIGHT POSTERIOR THIGH

## 2018-07-10 ASSESSMENT — LOCATION ZONE DERM
LOCATION ZONE: FACE
LOCATION ZONE: NOSE
LOCATION ZONE: LEG
LOCATION ZONE: TRUNK

## 2018-07-10 NOTE — PROCEDURE: TREATMENT REGIMEN
Plan: Apply desitin creamy diaper cream several times a day. If not better or if worse, would likely need wound care since Pt lost lots of weight lately (due to hospitalization) and May have too much pressure. Does not look like pressure ulcer at this time.
Detail Level: Zone

## 2018-07-10 NOTE — PROCEDURE: OTHER
Other (Free Text): Treated last visit has healed well.
Detail Level: Zone
Note Text (......Xxx Chief Complaint.): This diagnosis correlates with the
Other (Free Text): 5mm, deeper. Offered again to send to general surgeon but she would like to wait. Call if worse in any way. Pt and son indicate understanding.
Other (Free Text): Recommended CeraVe foaming wash and CeraVe moisturizing cream. Offered Tretinoin here or Rx but Pt does not want to do this due to cost. \\nDifferin OTC apply hs

## 2018-11-22 NOTE — HPI: SKIN LESION
Discussion/Summary   will change order for next infusion        Verified Results  IMMUNOGLOBULIN G, IGG [id number] 11:26AM TRISTON COLE   REASON FOR VISIT: CIVD   Ordering Provider: Triston Cole     Test Name Result Flag Reference   IMMUNOGLOBULIN G,  mg/dl A [id number]   PERFORMING LAB: [facility] [address] [facility] [id number]       
How Severe Is Your Skin Lesion?: mild
Has Your Skin Lesion Been Treated?: not been treated
Is This A New Presentation, Or A Follow-Up?: Growth

## 2020-06-12 ENCOUNTER — APPOINTMENT (OUTPATIENT)
Dept: GENERAL RADIOLOGY | Age: 85
End: 2020-06-12
Attending: EMERGENCY MEDICINE
Payer: MEDICARE

## 2020-06-12 ENCOUNTER — APPOINTMENT (OUTPATIENT)
Dept: CT IMAGING | Age: 85
End: 2020-06-12
Attending: EMERGENCY MEDICINE
Payer: MEDICARE

## 2020-06-12 ENCOUNTER — HOSPITAL ENCOUNTER (EMERGENCY)
Age: 85
Discharge: HOME | End: 2020-06-13
Attending: EMERGENCY MEDICINE
Payer: MEDICARE

## 2020-06-12 DIAGNOSIS — S01.01XA LACERATION OF SCALP, INITIAL ENCOUNTER: Primary | ICD-10-CM
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
## 2020-06-12 DIAGNOSIS — S51.011A SKIN TEAR OF RIGHT ELBOW WITHOUT COMPLICATION, INITIAL ENCOUNTER: ICD-10-CM

## 2020-06-12 DIAGNOSIS — S30.0XXA CONTUSION OF SACRUM, INITIAL ENCOUNTER: ICD-10-CM

## 2020-06-12 LAB
ERYTHROCYTE [DISTWIDTH] IN BLOOD BY AUTOMATED COUNT: 13.7 % (ref 11.9–14.6)
HCT VFR BLD AUTO: 35.6 % (ref 35.8–46.3)
HGB BLD-MCNC: 11.3 G/DL (ref 11.7–15.4)
MCH RBC QN AUTO: 30.1 PG (ref 26.1–32.9)
MCHC RBC AUTO-ENTMCNC: 31.7 G/DL (ref 31.4–35)
MCV RBC AUTO: 94.7 FL (ref 79.6–97.8)
NRBC # BLD: 0 K/UL (ref 0–0.2)
PLATELET # BLD AUTO: 344 K/UL (ref 150–450)
PMV BLD AUTO: 10 FL (ref 9.4–12.3)
RBC # BLD AUTO: 3.76 M/UL (ref 4.05–5.2)
WBC # BLD AUTO: 10.6 K/UL (ref 4.3–11.1)

## 2020-06-12 PROCEDURE — 85027 COMPLETE CBC AUTOMATED: CPT

## 2020-06-12 PROCEDURE — 99284 EMERGENCY DEPT VISIT MOD MDM: CPT

## 2020-06-12 PROCEDURE — 72220 X-RAY EXAM SACRUM TAILBONE: CPT

## 2020-06-12 PROCEDURE — 75810000293 HC SIMP/SUPERF WND  RPR

## 2020-06-12 PROCEDURE — 70450 CT HEAD/BRAIN W/O DYE: CPT

## 2020-06-13 VITALS
BODY MASS INDEX: 23.74 KG/M2 | HEIGHT: 62 IN | DIASTOLIC BLOOD PRESSURE: 80 MMHG | WEIGHT: 129 LBS | HEART RATE: 78 BPM | TEMPERATURE: 98.9 F | SYSTOLIC BLOOD PRESSURE: 157 MMHG | OXYGEN SATURATION: 95 % | RESPIRATION RATE: 18 BRPM

## 2020-06-13 PROCEDURE — 75810000293 HC SIMP/SUPERF WND  RPR

## 2020-06-13 PROCEDURE — 74011250637 HC RX REV CODE- 250/637: Performed by: EMERGENCY MEDICINE

## 2020-06-13 RX ORDER — HYDROCODONE BITARTRATE AND ACETAMINOPHEN 5; 325 MG/1; MG/1
1 TABLET ORAL
Status: DISCONTINUED | OUTPATIENT
Start: 2020-06-13 | End: 2020-06-13 | Stop reason: HOSPADM

## 2020-06-13 RX ADMIN — HYDROCODONE BITARTRATE AND ACETAMINOPHEN 1 TABLET: 5; 325 TABLET ORAL at 00:47

## 2020-06-13 NOTE — ED TRIAGE NOTES
Arrives with face mask in place. Arrives via [employer] from home s/p fall. Reports fall from standing position striking back of head. Lost balance while ambulating with walker. Laceration to posterior scalp, reports pain to site. Currently taking eliquis. Reports pain to sacrum. Skin tear to right elbow. Bleeding controlled on arrival with elida. Denies neck or back pain. A/o x3 on arrival. Lives alone.

## 2020-06-13 NOTE — DISCHARGE INSTRUCTIONS
Patient Education   Continue your Hawa Ramesh for pain. Clean abrasions and lacerations twice daily with antibacterial hand soap and water, dry, apply over-the-counter Polysporin or bacitracin ointment and cover with a Band-Aid or bandage. Scalp wound may just kept be kept coated with antibiotic ointment. Staples out in 10 days. Return if signs of infection with increased pain, redness, warmth or pus from the wound. Return if severe worsening headache especially if associated with nausea and vomiting or any significant change in mental status. Preventing Falls: Care Instructions  Your Care Instructions    Getting around your home safely can be a challenge if you have injuries or health problems that make it easy for you to fall. Loose rugs and furniture in walkways are among the dangers for many older people who have problems walking or who have poor eyesight. People who have conditions such as arthritis, osteoporosis, or dementia also have to be careful not to fall. You can make your home safer with a few simple measures. Follow-up care is a key part of your treatment and safety. Be sure to make and go to all appointments, and call your doctor if you are having problems. It's also a good idea to know your test results and keep a list of the medicines you take. How can you care for yourself at home? Taking care of yourself  · You may get dizzy if you do not drink enough water. To prevent dehydration, drink plenty of fluids, enough so that your urine is light yellow or clear like water. Choose water and other caffeine-free clear liquids. If you have kidney, heart, or liver disease and have to limit fluids, talk with your doctor before you increase the amount of fluids you drink. · Exercise regularly to improve your strength, muscle tone, and balance. Walk if you can. Swimming may be a good choice if you cannot walk easily. · Have your vision and hearing checked each year or any time you notice a change.  If you have trouble seeing and hearing, you might not be able to avoid objects and could lose your balance. · Know the side effects of the medicines you take. Ask your doctor or pharmacist whether the medicines you take can affect your balance. Sleeping pills or sedatives can affect your balance. · Limit the amount of alcohol you drink. Alcohol can impair your balance and other senses. · Ask your doctor whether calluses or corns on your feet need to be removed. If you wear loose-fitting shoes because of calluses or corns, you can lose your balance and fall. · Talk to your doctor if you have numbness in your feet. Preventing falls at home  · Remove raised doorway thresholds, throw rugs, and clutter. Repair loose carpet or raised areas in the floor. · Move furniture and electrical cords to keep them out of walking paths. · Use nonskid floor wax, and wipe up spills right away, especially on ceramic tile floors. · If you use a walker or cane, put rubber tips on it. If you use crutches, clean the bottoms of them regularly with an abrasive pad, such as steel wool. · Keep your house well lit, especially [place]ole [place], and outside walkways. Use night-lights in areas such as hallways and bathrooms. Add extra light switches or use remote switches (such as switches that go on or off when you clap your hands) to make it easier to turn lights on if you have to get up during the night. · Install sturdy handrails on stairways. · Move items in your cabinets so that the things you use a lot are on the lower shelves (about waist level). · Keep a cordless phone and a flashlight with new batteries by your bed. If possible, put a phone in each of the main rooms of your house, or carry a cell phone in case you fall and cannot reach a phone. Or, you can wear a device around your neck or wrist. You push a button that sends a signal for help. · Wear low-heeled shoes that fit well and give your feet good support.  Use footwear with nonskid soles. Check the heels and soles of your shoes for wear. Repair or replace worn heels or soles. · Do not wear socks without shoes on wood floors. · Walk on the grass when the sidewalks are slippery. If you live in an area that gets snow and ice in the winter, sprinkle salt on slippery steps and sidewalks. Preventing falls in the bath  · Install grab bars and nonskid mats inside and outside your shower or tub and near the toilet and sinks. · Use shower chairs and bath benches. · Use a hand-held shower head that will allow you to sit while showering. · Get into a tub or shower by putting the weaker leg in first. Get out of a tub or shower with your strong side first.  · Repair loose toilet seats and consider installing a raised toilet seat to make getting on and off the toilet easier. · Keep your bathroom door unlocked while you are in the shower. Where can you learn more? Go to http://leoImmure Recordsshmuel.info/. Enter [id number] [id number] in the search box to learn more about \"Preventing Falls: Care Instructions. \"  Current as of: March 16, 2018  Content Version: 11.8  © 0962-9016 [employer]. Care instructions adapted under license by [employer] (which disclaims liability or warranty for this information). If you have questions about a medical condition or this instruction, always ask your healthcare professional. [place] [address] any warranty or liability for your use of this information. Patient Education        Cuts Closed With Staples: Care Instructions  Your Care Instructions  A cut can happen anywhere on your body. The doctor used staples to close the cut. Staples easily and quickly close a cut, which helps the cut heal.  Sometimes a cut can injure tendons, blood vessels, or nerves. If the cut went deep and through the skin, the doctor may have put in a layer of stitches below the staples.  The deeper layer of stitches brings the deep part of the cut together. These stitches will dissolve and don't need to be removed. The staples in the upper layer are what you see on the cut. You may have a bandage. You will need to have the staples removed, usually in 7 to 14 days. The doctor has checked you carefully, but problems can develop later. If you notice any problems or new symptoms, get medical treatment right away. Follow-up care is a key part of your treatment and safety. Be sure to make and go to all appointments, and call your doctor if you are having problems. It's also a good idea to know your test results and keep a list of the medicines you take. How can you care for yourself at home? · Keep the cut dry for the first 24 to 48 hours. After this, you can shower if your doctor okays it. Pat the cut dry. · Don't soak the cut, such as in a bathtub. Your doctor will tell you when it's safe to get the cut wet. · If your doctor told you how to care for your cut, follow your doctor's instructions. If you did not get instructions, follow this general advice:  ? After the first 24 to 48 hours, wash around the cut with clean water 2 times a day. Don't use hydrogen peroxide or alcohol, which can slow healing. ? You may cover the cut with a thin layer of petroleum jelly, such as Vaseline, and a nonstick bandage. ? Apply more petroleum jelly and replace the bandage as needed. · Avoid any activity that could cause your cut to reopen. · Do not remove the staples on your own. Your doctor will tell you when to come back to have the staples removed. · Take pain medicines exactly as directed. ? If the doctor gave you a prescription medicine for pain, take it as prescribed. ? If you are not taking a prescription pain medicine, ask your doctor if you can take an over-the-counter medicine. When should you call for help? Call your doctor now or seek immediate medical care if:  · You have new pain, or your pain gets worse.   · The skin near the cut is cold or pale or changes color. · You have tingling, weakness, or numbness near the cut. · The cut starts to bleed, and blood soaks through the bandage. Oozing small amounts of blood is normal.  · You have trouble moving the area near the cut. · You have symptoms of infection, such as:  ? Increased pain, swelling, warmth, or redness around the cut.  ? Red streaks leading from the cut.  ? Pus draining from the cut.  ? A fever. Watch closely for changes in your health, and be sure to contact your doctor if:  · You do not get better as expected. Where can you learn more? Go to http://leo-shmuel.info/  Enter Z608 in the search box to learn more about \"Cuts Closed With Staples: Care Instructions. \"  Current as of: June 26, 2019               Content Version: 12.5  © 9081-9557 [employer]. Care instructions adapted under license by [employer] (which disclaims liability or warranty for this information). If you have questions about a medical condition or this instruction, always ask your healthcare professional. [address] any warranty or liability for your use of this information. Patient Education        Skin Tears: Care Instructions  Your Care Instructions  As we get older, our skin gets drier and more fragile. Sometimes this can cause the outer layers of skin to split and tear open. Skin tears are treated in different ways. In some cases, doctors use pieces of tape called Steri-Strips to pull the skin together and help it heal. Other times, it's best to leave the tear open and cover it with a special wound-care bandage. Skin tears are usually not serious. They usually heal in a few weeks. But how long you take to heal depends on your body and the type of tear you have. Sometimes the torn piece of skin is used to protect the wound while it heals. But that piece of skin does not heal. It may fall off on its own. Or the doctor may remove it.   As your tear heals, it's important to keep it clean to help prevent infection. The doctor has checked you carefully, but problems can develop later. If you notice any problems or new symptoms, get medical treatment right away. Follow-up care is a key part of your treatment and safety. Be sure to make and go to all appointments, and call your doctor if you are having problems. It's also a good idea to know your test results and keep a list of the medicines you take. How can you care for yourself at home? · If you have pain, ask your doctor if you can take an over-the-counter pain medicine, such as acetaminophen (Tylenol), ibuprofen (Advil, Motrin), or naproxen (Aleve). Be safe with medicines. Read and follow all instructions on the label. · If you have a bandage, follow your doctor's instructions for changing it. · If you have Steri-Strips, leave them on until they fall off. · Follow your doctor's instructions about bathing. · Gently wash the skin tear with plain water 2 times a day. Do not rub the area. · Let the area air dry. Or you can pat it carefully with a soft towel. When should you call for help? Call your doctor now or seek immediate medical care if:  · You have signs of infection, such as:  ? Increased pain, swelling, warmth, or redness around the tear. ? Red streaks leading from the tear. ? Pus draining from the tear. ? A fever. · The tear starts to bleed a lot. Small amounts of blood are normal.  Watch closely for changes in your health, and be sure to contact your doctor if:  · You do not get better as expected. Where can you learn more? Go to http://leo-shmuel.info/  Enter U055 in the search box to learn more about \"Skin Tears: Care Instructions. \"  Current as of: June 26, 2019               Content Version: 12.5  © 1011-3412 [employer].    Care instructions adapted under license by [employer] (which disclaims liability or warranty for this information). If you have questions about a medical condition or this instruction, always ask your healthcare professional. [place] [address] any warranty or liability for your use of this information.

## 2020-06-13 NOTE — ED PROVIDER NOTES
The history is provided by the patient. Fall   The accident occurred less than 1 hour ago. The fall occurred while walking. She fell from a height of ground level. The volume of blood lost was moderate. The point of impact was the head. The pain is present in the head (And sacrum). The pain is moderate. She was ambulatory at the scene. There was no entrapment after the fall. There was no drug use involved in the accident. There was no alcohol use involved in the accident. Associated symptoms include laceration. Pertinent negatives include no numbness, no abdominal pain, no nausea, no vomiting, no headaches, no extremity weakness, no loss of consciousness and no tingling. The risk factors include being elderly (On Eliquis). Exacerbated by: Nothing. Prehospitalization: Bandages. The patient's last tetanus shot was 5 to 10 years ago.        Past Medical History:   Diagnosis Date    Arthritis     CAD (coronary artery disease)     CHF (congestive heart failure) (HCC)     chronic systolic dysfunction    Depression     Hypertension     Hypothyroidism     Neuropathy     PUD (peptic ulcer disease)        Past Surgical History:   Procedure Laterality Date    CARDIAC SURG PROCEDURE UNLIST      stent x 2    HX APPENDECTOMY      HX CHOLECYSTECTOMY      HX HEART CATHETERIZATION      HX HEENT      HX ORTHOPAEDIC      right knee replaced    HX OTHER SURGICAL      carpal tunnel         Family History:   Problem Relation Age of Onset    Hypertension Other        Social History     Socioeconomic History    Marital status:      Spouse name: Not on file    Number of children: Not on file    Years of education: Not on file    Highest education level: Not on file   Occupational History    Not on file   Social Needs    Financial resource strain: Not on file    Food insecurity     Worry: Not on file     Inability: Not on file    Transportation needs     Medical: Not on file     Non-medical: Not on file Tobacco Use    Smoking status: Never Smoker    Smokeless tobacco: Never Used   Substance and Sexual Activity    Alcohol use: No    Drug use: No    Sexual activity: Not on file   Lifestyle    Physical activity     Days per week: Not on file     Minutes per session: Not on file    Stress: Not on file   Relationships    Social connections     Talks on phone: Not on file     Gets together: Not on file     Attends Judaism service: Not on file     Active member of club or organization: Not on file     Attends meetings of clubs or organizations: Not on file     Relationship status: Not on file    Intimate partner violence     Fear of current or ex partner: Not on file     Emotionally abused: Not on file     Physically abused: Not on file     Forced sexual activity: Not on file   Other Topics Concern    Not on file   Social History Narrative    Not on file         ALLERGIES: Keflex [cephalexin]; Pcn [penicillins]; and Sulfa (sulfonamide antibiotics)    Review of Systems   HENT: Negative for congestion and rhinorrhea. Respiratory: Negative for cough and shortness of breath. Cardiovascular: Negative for chest pain. Gastrointestinal: Negative for abdominal pain, nausea and vomiting. Genitourinary: Negative for dysuria. Musculoskeletal: Positive for back pain. Negative for extremity weakness and neck pain. Neurological: Negative for tingling, loss of consciousness, weakness, numbness and headaches. Vitals:    06/12/20 2107 06/12/20 2109   BP: 141/63 141/63   Pulse: 80 78   Resp:  18   Temp:  99 °F (37.2 °C)   SpO2: 96% 96%   Weight:  58.5 kg (129 lb)   Height:  5' 2\" (1.575 m)            Physical Exam  Vitals signs and nursing note reviewed. Constitutional:       General: She is not in acute distress. Appearance: She is well-developed. HENT:      Mouth/Throat:      Mouth: Mucous membranes are moist.   Eyes:      Pupils: Pupils are equal, round, and reactive to light.    Cardiovascular: Rate and Rhythm: Normal rate and regular rhythm. Heart sounds: Normal heart sounds. Pulmonary:      Effort: Pulmonary effort is normal.      Breath sounds: Normal breath sounds. Abdominal:      General: There is no distension. Palpations: Abdomen is soft. There is no mass. Tenderness: There is no abdominal tenderness. There is no guarding or rebound. Musculoskeletal: Normal range of motion. Comments: Superficial skin tear right elbow bleeding controlled. No bony tenderness and full range of motion present. Sacral tenderness present. No midline lumbar or thoracic tenderness. Lymphadenopathy:      Cervical: No cervical adenopathy. Skin:     General: Skin is warm and dry. Findings: Laceration present. Neurological:      General: No focal deficit present. Mental Status: She is alert and oriented to person, place, and time. MDM  Number of Diagnoses or Management Options  Diagnosis management comments: Fall without definite loss of consciousness but head injury with scalp laceration was present and patient is on Eliquis. CT scan negative for skull fracture or intracranial hemorrhage. There was no neck or back tenderness other than some sacral tenderness but x-ray of the sacrum and coccyx was negative. 2 staples placed in a small half centimeter punctate scalp laceration with good hemostasis. Patient tolerated procedure well without anesthetic as initially it was felt only 1 staple would be needed. Patient takes Joseph Conner at home and requested one for the pain from the staple.        Amount and/or Complexity of Data Reviewed  Clinical lab tests: ordered and reviewed (Results for orders placed or performed during the hospital encounter of 06/12/20  -CBC W/O DIFF       Result                      Value             Ref Range           WBC                         10.6              4.3 - 11.1 K*       RBC                         3.76 (L)          4.05 - 5.2 M* HGB                         11.3 (L)          11.7 - 15.4 *       HCT                         35.6 (L)          35.8 - 46.3 %       MCV                         94.7              79.6 - 97.8 *       MCH                         30.1              26.1 - 32.9 *       MCHC                        31.7              31.4 - 35.0 *       RDW                         13.7              11.9 - 14.6 %       PLATELET                    344               150 - 450 K/*       MPV                         10.0              9.4 - 12.3 FL       ABSOLUTE NRBC               0.00              0.0 - 0.2 K/*  )  Tests in the radiology section of CPT®: ordered and reviewed (Xr Sacrum And Coccyx    Result Date: 6/12/2020  EXAM:  XR SACRUM AND COCCYX INDICATION:   sacral pain, injury COMPARISON: None. FINDINGS: AP and lateral views of the sacrum and coccyx demonstrate no fracture or other abnormality. IMPRESSION:  Normal sacrum. Ct Head Wo Cont    Result Date: 6/12/2020  CT HEAD WITHOUT CONTRAST HISTORY:  Head trauma. COMPARISON: None. TECHNIQUE: Axial imaging was performed without intravenous contrast utilizing 5mm slice thickness. Sagittal and coronal reformats were performed. Radiation dose reduction techniques were used for this study. Our CT scanner uses one or all of the following: Automated exposure control, adjustment of the MAS or KUB according to patient's size and iterative reconstruction. FINDINGS:    *BRAIN:    -  There are no early signs of territorial or lacunar infarction by CT.    -  No intracranial mass, hematoma, or hydrocephalus. -  For patient's age, the scattered areas of white matter hypodensities may represent a chronic small vessel white matter ischemia. However this is nonspecific. *VISUALIZED PARANASAL SINUSES: Well aerated. *MASTOIDS:  Clear. *CALVARIUM AND SCALP: Unremarkable. IMPRESSION: No evidence of acute intracranial trauma.  Date of Dictation: 6/12/2020 11:11 PM     )           Wound Repair  Date/Time: 6/13/2020 12:01 AM  Performed by: attendingPreparation: skin prepped with ChloraPrep  Location details: scalp  Wound length:2.5 cm or less  Skin closure: staples  Number of sutures: 2  Approximation: close  Dressing: antibiotic ointment  Patient tolerance: Patient tolerated the procedure well with no immediate complications  My total time at bedside, performing this procedure was 1-15 minutes.

## 2020-06-13 NOTE — ED NOTES
I have reviewed discharge instructions with the patient. The patient verbalized understanding. Patient left ED via Discharge Method: wheelchair to Home with son. Opportunity for questions and clarification provided. Patient given 0 scripts. To continue your aftercare when you leave the hospital, you may receive an automated call from our care team to check in on how you are doing. This is a free service and part of our promise to provide the best care and service to meet your aftercare needs.  If you have questions, or wish to unsubscribe from this service please call [phone]. Thank you for Choosing our [facility].

## 2020-06-16 ENCOUNTER — PATIENT OUTREACH (OUTPATIENT)
Dept: CASE MANAGEMENT | Age: 85
End: 2020-06-16

## 2020-06-16 NOTE — PROGRESS NOTES
This note will not be viewable in [address] 1st Attempt to contact patient for JACIEL call, no answer, left  for returned call.  Will attempt to contact patient again within 24 hours

## 2020-06-17 ENCOUNTER — PATIENT OUTREACH (OUTPATIENT)
Dept: CASE MANAGEMENT | Age: 85
End: 2020-06-17

## 2020-06-17 NOTE — PROGRESS NOTES
This note will not be viewable in [address] 2nd attempt to contact patient for [facility] education Call. No answer, left Vm for returned call. Episode will be closed at this time as [address] has been unsuccessful in reaching the patient.

## 2021-04-10 ENCOUNTER — ANESTHESIA (OUTPATIENT)
Dept: ENDOSCOPY | Age: 86
End: 2021-04-10
Payer: MEDICARE

## 2021-04-10 ENCOUNTER — ANESTHESIA EVENT (OUTPATIENT)
Dept: ENDOSCOPY | Age: 86
End: 2021-04-10
Payer: MEDICARE

## 2021-04-10 ENCOUNTER — APPOINTMENT (OUTPATIENT)
Dept: GENERAL RADIOLOGY | Age: 86
End: 2021-04-10
Attending: EMERGENCY MEDICINE
Payer: MEDICARE

## 2021-04-10 ENCOUNTER — HOSPITAL ENCOUNTER (EMERGENCY)
Age: 86
Discharge: HOME | End: 2021-04-10
Attending: EMERGENCY MEDICINE
Payer: MEDICARE

## 2021-04-10 VITALS
BODY MASS INDEX: 24.54 KG/M2 | SYSTOLIC BLOOD PRESSURE: 155 MMHG | HEART RATE: 97 BPM | TEMPERATURE: 98.1 F | DIASTOLIC BLOOD PRESSURE: 77 MMHG | WEIGHT: 125 LBS | RESPIRATION RATE: 16 BRPM | OXYGEN SATURATION: 93 % | HEIGHT: 60 IN

## 2021-04-10 DIAGNOSIS — K22.2 ESOPHAGEAL STRICTURE: Primary | ICD-10-CM

## 2021-04-10 LAB
ALBUMIN SERPL-MCNC: 4 G/DL (ref 3.2–4.6)
ALBUMIN/GLOB SERPL: 1.2 {RATIO} (ref 1.2–3.5)
ALP SERPL-CCNC: 56 U/L (ref 50–136)
ALT SERPL-CCNC: 18 U/L (ref 12–65)
ANION GAP SERPL CALC-SCNC: 11 MMOL/L (ref 7–16)
AST SERPL-CCNC: 25 U/L (ref 15–37)
BASOPHILS # BLD: 0 K/UL (ref 0–0.2)
BASOPHILS NFR BLD: 0 % (ref 0–2)
BILIRUB SERPL-MCNC: 0.7 MG/DL (ref 0.2–1.1)
BUN SERPL-MCNC: 20 MG/DL (ref 8–23)
CALCIUM SERPL-MCNC: 9.7 MG/DL (ref 8.3–10.4)
CHLORIDE SERPL-SCNC: 105 MMOL/L (ref 98–107)
CO2 SERPL-SCNC: 24 MMOL/L (ref 21–32)
CREAT SERPL-MCNC: 0.53 MG/DL (ref 0.6–1)
DIFFERENTIAL METHOD BLD: ABNORMAL
EOSINOPHIL # BLD: 0 K/UL (ref 0–0.8)
EOSINOPHIL NFR BLD: 0 % (ref 0.5–7.8)
ERYTHROCYTE [DISTWIDTH] IN BLOOD BY AUTOMATED COUNT: 13.8 % (ref 11.9–14.6)
GLOBULIN SER CALC-MCNC: 3.3 G/DL (ref 2.3–3.5)
GLUCOSE SERPL-MCNC: 88 MG/DL (ref 65–100)
HCT VFR BLD AUTO: 40.2 % (ref 35.8–46.3)
HGB BLD-MCNC: 13 G/DL (ref 11.7–15.4)
IMM GRANULOCYTES # BLD AUTO: 0 K/UL (ref 0–0.5)
IMM GRANULOCYTES NFR BLD AUTO: 0 % (ref 0–5)
LIPASE SERPL-CCNC: 72 U/L (ref 73–393)
LYMPHOCYTES # BLD: 0.7 K/UL (ref 0.5–4.6)
LYMPHOCYTES NFR BLD: 7 % (ref 13–44)
MAGNESIUM SERPL-MCNC: 1.8 MG/DL (ref 1.8–2.4)
MCH RBC QN AUTO: 29.7 PG (ref 26.1–32.9)
MCHC RBC AUTO-ENTMCNC: 32.3 G/DL (ref 31.4–35)
MCV RBC AUTO: 92 FL (ref 79.6–97.8)
MONOCYTES # BLD: 0.4 K/UL (ref 0.1–1.3)
MONOCYTES NFR BLD: 4 % (ref 4–12)
NEUTS SEG # BLD: 9.4 K/UL (ref 1.7–8.2)
NEUTS SEG NFR BLD: 89 % (ref 43–78)
NRBC # BLD: 0 K/UL (ref 0–0.2)
PLATELET # BLD AUTO: 322 K/UL (ref 150–450)
PMV BLD AUTO: 10.2 FL (ref 9.4–12.3)
POTASSIUM SERPL-SCNC: 3.3 MMOL/L (ref 3.5–5.1)
PROT SERPL-MCNC: 7.3 G/DL (ref 6.3–8.2)
RBC # BLD AUTO: 4.37 M/UL (ref 4.05–5.2)
SODIUM SERPL-SCNC: 140 MMOL/L (ref 136–145)
WBC # BLD AUTO: 10.6 K/UL (ref 4.3–11.1)

## 2021-04-10 PROCEDURE — 74011000250 HC RX REV CODE- 250: Performed by: NURSE ANESTHETIST, CERTIFIED REGISTERED

## 2021-04-10 PROCEDURE — 99283 EMERGENCY DEPT VISIT LOW MDM: CPT

## 2021-04-10 PROCEDURE — 85025 COMPLETE CBC W/AUTO DIFF WBC: CPT

## 2021-04-10 PROCEDURE — 83735 ASSAY OF MAGNESIUM: CPT

## 2021-04-10 PROCEDURE — 80053 COMPREHEN METABOLIC PANEL: CPT

## 2021-04-10 PROCEDURE — 83690 ASSAY OF LIPASE: CPT

## 2021-04-10 PROCEDURE — 74011250636 HC RX REV CODE- 250/636: Performed by: NURSE ANESTHETIST, CERTIFIED REGISTERED

## 2021-04-10 PROCEDURE — 77030039425 HC BLD LARYNG TRULITE DISP TELE -A: Performed by: ANESTHESIOLOGY

## 2021-04-10 PROCEDURE — 77030037227 HC FCPS ENDO GRSP RESCU DISP BSC -B: Performed by: INTERNAL MEDICINE

## 2021-04-10 PROCEDURE — 76060000033 HC ANESTHESIA 1 TO 1.5 HR: Performed by: INTERNAL MEDICINE

## 2021-04-10 PROCEDURE — 2709999900 HC NON-CHARGEABLE SUPPLY: Performed by: INTERNAL MEDICINE

## 2021-04-10 PROCEDURE — 74011250636 HC RX REV CODE- 250/636: Performed by: ANESTHESIOLOGY

## 2021-04-10 PROCEDURE — 77030037345 HC NET FB ENDO RETVR RESCUNT DISP BSC -B: Performed by: INTERNAL MEDICINE

## 2021-04-10 PROCEDURE — 77030037088 HC TUBE ENDOTRACH ORAL NSL COVD-A: Performed by: ANESTHESIOLOGY

## 2021-04-10 PROCEDURE — 77030019908 HC STETH ESOPH SIMS -A: Performed by: ANESTHESIOLOGY

## 2021-04-10 PROCEDURE — 71046 X-RAY EXAM CHEST 2 VIEWS: CPT

## 2021-04-10 PROCEDURE — 77030020032: Performed by: INTERNAL MEDICINE

## 2021-04-10 PROCEDURE — 76040000026: Performed by: INTERNAL MEDICINE

## 2021-04-10 PROCEDURE — 77030040922 HC BLNKT HYPOTHRM STRY -A: Performed by: ANESTHESIOLOGY

## 2021-04-10 PROCEDURE — 77030013991 HC SNR POLYP ENDOSC BSC -A: Performed by: INTERNAL MEDICINE

## 2021-04-10 RX ORDER — SUCCINYLCHOLINE CHLORIDE 20 MG/ML
INJECTION INTRAMUSCULAR; INTRAVENOUS AS NEEDED
Status: DISCONTINUED | OUTPATIENT
Start: 2021-04-10 | End: 2021-04-10 | Stop reason: HOSPADM

## 2021-04-10 RX ORDER — PROPOFOL 10 MG/ML
INJECTION, EMULSION INTRAVENOUS AS NEEDED
Status: DISCONTINUED | OUTPATIENT
Start: 2021-04-10 | End: 2021-04-10 | Stop reason: HOSPADM

## 2021-04-10 RX ORDER — ETOMIDATE 2 MG/ML
INJECTION INTRAVENOUS AS NEEDED
Status: DISCONTINUED | OUTPATIENT
Start: 2021-04-10 | End: 2021-04-10 | Stop reason: HOSPADM

## 2021-04-10 RX ORDER — SODIUM CHLORIDE, SODIUM LACTATE, POTASSIUM CHLORIDE, CALCIUM CHLORIDE 600; 310; 30; 20 MG/100ML; MG/100ML; MG/100ML; MG/100ML
25 INJECTION, SOLUTION INTRAVENOUS CONTINUOUS
Status: DISCONTINUED | OUTPATIENT
Start: 2021-04-10 | End: 2021-04-10 | Stop reason: HOSPADM

## 2021-04-10 RX ORDER — METOPROLOL TARTRATE 5 MG/5ML
INJECTION INTRAVENOUS AS NEEDED
Status: DISCONTINUED | OUTPATIENT
Start: 2021-04-10 | End: 2021-04-10 | Stop reason: HOSPADM

## 2021-04-10 RX ORDER — FENTANYL CITRATE 50 UG/ML
INJECTION, SOLUTION INTRAMUSCULAR; INTRAVENOUS AS NEEDED
Status: DISCONTINUED | OUTPATIENT
Start: 2021-04-10 | End: 2021-04-10 | Stop reason: HOSPADM

## 2021-04-10 RX ORDER — ESMOLOL HYDROCHLORIDE 10 MG/ML
INJECTION INTRAVENOUS AS NEEDED
Status: DISCONTINUED | OUTPATIENT
Start: 2021-04-10 | End: 2021-04-10 | Stop reason: HOSPADM

## 2021-04-10 RX ORDER — LIDOCAINE HYDROCHLORIDE 20 MG/ML
INJECTION, SOLUTION EPIDURAL; INFILTRATION; INTRACAUDAL; PERINEURAL AS NEEDED
Status: DISCONTINUED | OUTPATIENT
Start: 2021-04-10 | End: 2021-04-10 | Stop reason: HOSPADM

## 2021-04-10 RX ORDER — ROCURONIUM BROMIDE 10 MG/ML
INJECTION, SOLUTION INTRAVENOUS AS NEEDED
Status: DISCONTINUED | OUTPATIENT
Start: 2021-04-10 | End: 2021-04-10 | Stop reason: HOSPADM

## 2021-04-10 RX ADMIN — ETOMIDATE 10 MG: 2 INJECTION, SOLUTION INTRAVENOUS at 13:14

## 2021-04-10 RX ADMIN — SODIUM CHLORIDE, SODIUM LACTATE, POTASSIUM CHLORIDE, AND CALCIUM CHLORIDE 25 ML/HR: 600; 310; 30; 20 INJECTION, SOLUTION INTRAVENOUS at 12:27

## 2021-04-10 RX ADMIN — PROPOFOL 20 MG: 10 INJECTION, EMULSION INTRAVENOUS at 13:23

## 2021-04-10 RX ADMIN — PHENYLEPHRINE HYDROCHLORIDE 120 MCG: 10 INJECTION INTRAVENOUS at 13:35

## 2021-04-10 RX ADMIN — LIDOCAINE HYDROCHLORIDE 60 MG: 20 INJECTION, SOLUTION EPIDURAL; INFILTRATION; INTRACAUDAL; PERINEURAL at 13:14

## 2021-04-10 RX ADMIN — PHENYLEPHRINE HYDROCHLORIDE 120 MCG: 10 INJECTION INTRAVENOUS at 13:22

## 2021-04-10 RX ADMIN — PHENYLEPHRINE HYDROCHLORIDE 120 MCG: 10 INJECTION INTRAVENOUS at 13:37

## 2021-04-10 RX ADMIN — ESMOLOL HYDROCHLORIDE 20 MG: 10 INJECTION, SOLUTION INTRAVENOUS at 13:23

## 2021-04-10 RX ADMIN — ROCURONIUM BROMIDE 5 MG: 10 INJECTION, SOLUTION INTRAVENOUS at 13:14

## 2021-04-10 RX ADMIN — PROPOFOL 50 MG: 10 INJECTION, EMULSION INTRAVENOUS at 13:14

## 2021-04-10 RX ADMIN — PHENYLEPHRINE HYDROCHLORIDE 120 MCG: 10 INJECTION INTRAVENOUS at 13:18

## 2021-04-10 RX ADMIN — METOPROLOL TARTRATE 1 MG: 5 INJECTION, SOLUTION INTRAVENOUS at 14:09

## 2021-04-10 RX ADMIN — SUCCINYLCHOLINE CHLORIDE 140 MG: 20 INJECTION, SOLUTION INTRAMUSCULAR; INTRAVENOUS at 13:15

## 2021-04-10 RX ADMIN — ESMOLOL HYDROCHLORIDE 10 MG: 10 INJECTION, SOLUTION INTRAVENOUS at 13:10

## 2021-04-10 RX ADMIN — PROPOFOL 20 MG: 10 INJECTION, EMULSION INTRAVENOUS at 13:24

## 2021-04-10 RX ADMIN — FENTANYL CITRATE 50 MCG: 50 INJECTION INTRAMUSCULAR; INTRAVENOUS at 13:14

## 2021-04-10 NOTE — ANESTHESIA PREPROCEDURE EVALUATION
Relevant Problems   CARDIOVASCULAR   (+) Atrial fibrillation with RVR (HCC)   (+) CAD (coronary artery disease)   (+) CHF (congestive heart failure) (HCC)   (+) HTN (hypertension)       Anesthetic History               Review of Systems / Medical History  Patient summary reviewed and pertinent labs reviewed    Pulmonary                   Neuro/Psych              Cardiovascular    Hypertension  Valvular problems/murmurs: aortic stenosis    CHF  Dysrhythmias : atrial fibrillation  CAD and cardiac stents (? 5 years ago)  Pertinent negatives: Angina: hx low EF.   Exercise tolerance: >4 METS  Comments: Ef 20-25% in 2018 with AS   GI/Hepatic/Renal               Comments: Esophageal food bolus Endo/Other      Hypothyroidism       Other Findings            Physical Exam    Airway  Mallampati: III    Neck ROM: decreased range of motion        Cardiovascular    Rhythm: irregular      Murmur: Grade 2, Aortic area     Dental         Pulmonary  Breath sounds clear to auscultation               Abdominal         Other Findings            Anesthetic Plan    ASA: 4, emergent  Anesthesia type: general            Anesthetic plan and risks discussed with: Patient      Spoke with son by phone

## 2021-04-10 NOTE — PROGRESS NOTES
Discharge instructions reviewed with pt and family member (son Jackson Buckley) who verbalize understanding of follow up care.

## 2021-04-10 NOTE — CONSULTS
[facility] Consult Note       Primary GI Physician: Dr. Adrienne Burgess    Referring Provider:  Dr. Diana Hsu, ED    Consult Date:  4/10/2021    Admit Date:  4/10/2021    Chief Complaint:  Dysphagia    Subjective:     History of Present Illness:  Patient is a 80 y.o. female with PMH of including but not limited to CAD on Eliquis (but none since 4/8), CHF with most recent EF 10/21/20 with EF 45-55%, HTN, Hypothyroid, Depression, Neuropathy, PUD, hx of esophageal stricture, who is seen in consultation at the request of Dr. Diana Hsu for dysphagia. Ms. Cony Mo has a history of esophageal stricture. She underwent EGD on 5/31/2018 by Dr. Lia Eden which revealed Tight UES stricture such that scope would not pass. Savary wire threaded through UES. Dilated to #36 Fr Savary. Patient has continued to have dysphagia that has been worsening. She didn't return for repeat dilation because \"I'm afraid I won't wake up from the anesthesia. \" She was eating chicken noodle soup and grits on Thursday when \"it got stuck. \" she has been unable to swallow anything since including liquids, pills and now her saliva. She was unable to swallow water in the ED. She has associated epigastric abdominal pain, but denies any GERD, melena, or weight loss. She has not had any known problems with previous anesthesia. She had an EF 20-25% in 2018, but this has now improved. She denies any need for O2 and does not have sleep apnea. She denies any NSAIDS, tobacco or ETOH. She does have a  home. Colonoscopy 5/25/2006 by Dr. Daina Carter revealed sigmoid diverticulosis, but otherwise unremarkable.      PMH:  Past Medical History:   Diagnosis Date    Arthritis     CAD (coronary artery disease)     CHF (congestive heart failure) (HCC)     chronic systolic dysfunction    Depression     Hypertension     Hypothyroidism     Neuropathy     PUD (peptic ulcer disease)        PSH:  Past Surgical History:   Procedure Laterality Date    CARDIAC SURG PROCEDURE UNLIST      stent x 2    HX APPENDECTOMY      HX CHOLECYSTECTOMY      HX HEART CATHETERIZATION      HX HEENT      HX ORTHOPAEDIC      right knee replaced    HX OTHER SURGICAL      carpal tunnel       Allergies: Allergies   Allergen Reactions    Keflex [Cephalexin] Other (comments)    Pcn [Penicillins] Rash    Sulfa (Sulfonamide Antibiotics) Rash       Home Medications:  Prior to Admission medications    Medication Sig Start Date End Date Taking? Authorizing Provider   amiodarone (CORDARONE) 200 mg tablet Take 200 mg by mouth daily. Provider, Historical   cyanocobalamin (Vitamin B-12) 1,000 mcg tablet Take 1,000 mcg by mouth daily. Provider, Historical   diclofenac (VOLTAREN) 1 % gel Apply  to affected area four (4) times daily. Provider, Historical   ferrous sulfate (Slow Fe) 142 mg (45 mg iron) ER tablet Take  by mouth Daily (before breakfast). Provider, Historical   furosemide (LASIX) 40 mg tablet 1/2 tablet daily PRN swelling    Provider, Historical   levothyroxine (SYNTHROID) 50 mcg tablet Take 50 mcg by mouth Daily (before breakfast). Provider, Historical   magnesium oxide (MAG-OX) 400 mg tablet Take 400 mg by mouth two (2) times a day. Provider, Historical   HYDROcodone-acetaminophen (NORCO) 7.5-325 mg per tablet Take 1 Tab by mouth every eight (8) hours as needed for Pain. Provider, Historical   LORazepam (ATIVAN) 0.5 mg tablet Take  by mouth every eight (8) hours as needed for Anxiety. Provider, Historical   mirtazapine (REMERON) 15 mg tablet Take 15 mg by mouth nightly. Provider, Historical   apixaban (ELIQUIS) 2.5 mg tablet Take 1 Tab by mouth two (2) times a day. 4/5/18   Anitha Valdovinos MD   lisinopril (PRINIVIL, ZESTRIL) 2.5 mg tablet Take 1 Tab by mouth daily.  4/6/18   Anitha Valdovinos MD   gabapentin (NEURONTIN) 400 mg capsule 1 cap BID & 2 caps qHS  Indications: 400mg bid and 800mg at hs    Provider, Historical   aspirin delayed-release 81 mg tablet Take 81 mg by mouth daily. Provider, Historical   carvedilol (COREG) 6.25 mg tablet Take 3.125 mg by mouth two (2) times daily (with meals). Provider, Historical   cholecalciferol, vitamin D3, (VITAMIN D3) 2,000 unit tab Take  by mouth. Provider, Historical   fenofibrate micronized (LOFIBRA) 134 mg capsule Take  by mouth every morning. Provider, Historical   omeprazole (PRILOSEC) 20 mg capsule Take 20 mg by mouth two (2) times a day. Provider, Historical   polyethylene glycol (MIRALAX) 17 gram packet Take 17 g by mouth daily. Provider, Historical   sucralfate (CARAFATE) 1 gram tablet Take 1 g by mouth Before breakfast, lunch, dinner and at bedtime. Provider, Historical   nitroglycerin (NITROSTAT) 0.4 mg SL tablet 1 Tab by SubLINGual route every five (5) minutes as needed for Chest Pain. 7/15/16   Melissa Robb MD   fentaNYL (DURAGESIC) 25 mcg/hr PATCH 1 Patch by TransDERmal route every seventy-two (72) hours. Marky Ledesma MD   multivitamin (ONE A DAY) tablet Take 1 Tab by mouth daily. Marky Ledesma MD   calcium-cholecalciferol, d3, (CALCIUM 600 + D) 600-125 mg-unit tab Take 500 mg by mouth. Indications: HYPOCALCEMIA    Provider, Historical   PRAVASTATIN 80 mg tablet take 80 mg by mouth daily. Marky Ledesma MD   CYMBALTA 60 mg CpDR take 60 mg by mouth daily. Marky Ledesma MD   IMITREX 100 mg Tab take 100 mg by mouth once as needed for Migraine. Marky Ledesma MD       Hospital Medications:  No current facility-administered medications for this encounter. Current Outpatient Medications   Medication Sig    amiodarone (CORDARONE) 200 mg tablet Take 200 mg by mouth daily.  cyanocobalamin (Vitamin B-12) 1,000 mcg tablet Take 1,000 mcg by mouth daily.  diclofenac (VOLTAREN) 1 % gel Apply  to affected area four (4) times daily.  ferrous sulfate (Slow Fe) 142 mg (45 mg iron) ER tablet Take  by mouth Daily (before breakfast).     furosemide (LASIX) 40 mg tablet 1/2 tablet daily PRN swelling    levothyroxine (SYNTHROID) 50 mcg tablet Take 50 mcg by mouth Daily (before breakfast).  magnesium oxide (MAG-OX) 400 mg tablet Take 400 mg by mouth two (2) times a day.  HYDROcodone-acetaminophen (NORCO) 7.5-325 mg per tablet Take 1 Tab by mouth every eight (8) hours as needed for Pain.  LORazepam (ATIVAN) 0.5 mg tablet Take  by mouth every eight (8) hours as needed for Anxiety.  mirtazapine (REMERON) 15 mg tablet Take 15 mg by mouth nightly.  apixaban (ELIQUIS) 2.5 mg tablet Take 1 Tab by mouth two (2) times a day.  lisinopril (PRINIVIL, ZESTRIL) 2.5 mg tablet Take 1 Tab by mouth daily.  gabapentin (NEURONTIN) 400 mg capsule 1 cap BID & 2 caps qHS  Indications: 400mg bid and 800mg at hs    aspirin delayed-release 81 mg tablet Take 81 mg by mouth daily.  carvedilol (COREG) 6.25 mg tablet Take 3.125 mg by mouth two (2) times daily (with meals).  cholecalciferol, vitamin D3, (VITAMIN D3) 2,000 unit tab Take  by mouth.  fenofibrate micronized (LOFIBRA) 134 mg capsule Take  by mouth every morning.  omeprazole (PRILOSEC) 20 mg capsule Take 20 mg by mouth two (2) times a day.  polyethylene glycol (MIRALAX) 17 gram packet Take 17 g by mouth daily.  sucralfate (CARAFATE) 1 gram tablet Take 1 g by mouth Before breakfast, lunch, dinner and at bedtime.  nitroglycerin (NITROSTAT) 0.4 mg SL tablet 1 Tab by SubLINGual route every five (5) minutes as needed for Chest Pain.  fentaNYL (DURAGESIC) 25 mcg/hr PATCH 1 Patch by TransDERmal route every seventy-two (72) hours.  multivitamin (ONE A DAY) tablet Take 1 Tab by mouth daily.  calcium-cholecalciferol, d3, (CALCIUM 600 + D) 600-125 mg-unit tab Take 500 mg by mouth. Indications: HYPOCALCEMIA    PRAVASTATIN 80 mg tablet take 80 mg by mouth daily.  CYMBALTA 60 mg CpDR take 60 mg by mouth daily.  IMITREX 100 mg Tab take 100 mg by mouth once as needed for Migraine.        Social History:  Social History     Tobacco Use    Smoking status: Never Smoker    Smokeless tobacco: Never Used   Substance Use Topics    Alcohol use: No       Family History:  Family History   Problem Relation Age of Onset    Hypertension Other        Review of Systems:  A detailed 10 system ROS is obtained, with pertinent positives as listed above. All others are negative. Diet:  NPO    Objective:     Physical Exam:  Vitals:  Visit Vitals  BP (!) 149/92   Pulse 90   Temp 98 °F (36.7 °C)   Resp 16   Ht 5' (1.524 m)   Wt 56.7 kg (125 lb)   SpO2 97%   BMI 24.41 kg/m²     Gen:  Pt is alert, cooperative, no acute distress THIN; HARD OF HEARING  Skin:  Extremities and face reveal no rashes. HEENT: Sclerae anicteric. Extra-occular muscles are intact. No oral ulcers. No abnormal pigmentation of the lips. The neck is supple. Cardiovascular: Regular rate and rhythm. No murmurs, gallops, or rubs. Respiratory:  Comfortable breathing with no accessory muscle use. Clear breath sounds anteriorly with no wheezes, rales, or rhonchi. GI:  Abdomen nondistended, soft, and MILD EPIGASTRIC TENDERNESS Normal active bowel sounds. No enlargement of the liver or spleen. No masses palpable. Musculoskeletal:  No pitting edema of the lower legs. Neurological:  Gross memory appears intact. Patient is alert and oriented. Psychiatric:  Mood appears appropriate with judgement intact. Lymphatic:  No cervical or supraclavicular adenopathy.     Laboratory:    Recent Labs     04/10/21  1007   WBC 10.6   HGB 13.0   HCT 40.2      MCV 92.0      K 3.3*      CO2 24   BUN 20   CREA 0.53*   CA 9.7   GLU 88   AP 56   ALT 18   TBILI 0.7   ALB 4.0   TP 7.3          Assessment:     Active Problems:  Dysphagia  Food bolus    80 y.o. female with PMH of including but not limited to CAD on Eliquis (but none since 4/8), CHF with most recent EF 10/21/20 with EF 45-55%, HTN, Hypothyroid, Depression, Neuropathy, PUD, hx of esophageal stricture, who is seen in consultation at the request of Dr. Antionette Martinez for dysphagia. Ms. Trae Art has a history of esophageal stricture. She underwent EGD on 5/31/2018 by Dr. Izzy Ayala which revealed Tight UES stricture such that scope would not pass. Savary wire threaded through UES. Dilated to #36 Fr Savary. Patient has continued to have dysphagia that has been worsening, now unable to eat or drink anything since 4/8. She likely has recurrent esophageal stricture. Plan:     -Schedule for EGD with food bolus extraction with Dr. Flaca Cherry today 4/10/2021. She was reassured that although there are risks to the procedure with her age, there is nothing in her medical history of suggest that she can't tolerate anesthesia. She has done well with anesthesia in the past. Assured her that there is not an effective alternative with her current dysphagia and history of esophageal stricture.   -Keep NPO until procedure  -continue to hold Eliquis   -Plan to discharge home following procedure. She assures me she has a ride home. Patient is seen and examined in collaboration with Dr. Flaca Cherry. Assessment and plan as per Dr. Flaca Cherry. Tavo Acevedo. Misbah Mota, [address]   [facility]    Patient seen and examined. Agree with above. Old Office and Hospital records are reviewed. She has a history of high esophageal stricture dilated at last EGD by Dr. Izzy Ayala but also a lower stricture that was previously dilated. She reports recent evaluation and labs by Dr. Gabe Alvarez were all good\". Initially apprehensive about proceeding with EGD but now more comfortable and positive about proceeding with planned EGD. Discussed assessment and plans with patient and her son by Phone who is on his way here from [facility]. Understands possible discharge if obstruction relieved or hospitalization if necessary. Ariane Keyes MD

## 2021-04-10 NOTE — ED PROVIDER NOTES
60-year-old female with a history of esophageal \"stretching\" presents with difficulty swallowing her pills since Thursday. She reports vomiting up \"white foam\" since that time. She has been told she cannot have another esophageal dilation as she would \"not wake up. \"  She reports increased weakness as she has been unable to eat and lives alone. She has difficulty swallowing liquids. She denies abdominal pain. No fever or chills. Denies any choking spells. Poor historian. According to chart, last esophogeal dilation in 2018 by Dr Courtney Higgins for stricture. Foreign Body Swallowed  Associated symptoms include trouble swallowing and vomiting. Pertinent negatives include no fever, no hearing loss, no cough, no chest pain and no abdominal pain.         Past Medical History:   Diagnosis Date    Arthritis     CAD (coronary artery disease)     CHF (congestive heart failure) (HCC)     chronic systolic dysfunction    Depression     Hypertension     Hypothyroidism     Neuropathy     PUD (peptic ulcer disease)        Past Surgical History:   Procedure Laterality Date    CARDIAC SURG PROCEDURE UNLIST      stent x 2    HX APPENDECTOMY      HX CHOLECYSTECTOMY      HX HEART CATHETERIZATION      HX HEENT      HX ORTHOPAEDIC      right knee replaced    HX OTHER SURGICAL      carpal tunnel         Family History:   Problem Relation Age of Onset    Hypertension Other        Social History     Socioeconomic History    Marital status:      Spouse name: Not on file    Number of children: Not on file    Years of education: Not on file    Highest education level: Not on file   Occupational History    Not on file   Social Needs    Financial resource strain: Not on file    Food insecurity     Worry: Not on file     Inability: Not on file    Transportation needs     Medical: Not on file     Non-medical: Not on file   Tobacco Use    Smoking status: Never Smoker    Smokeless tobacco: Never Used   Substance and Sexual Activity    Alcohol use: No    Drug use: No    Sexual activity: Not on file   Lifestyle    Physical activity     Days per week: Not on file     Minutes per session: Not on file    Stress: Not on file   Relationships    Social connections     Talks on phone: Not on file     Gets together: Not on file     Attends Muslim service: Not on file     Active member of club or organization: Not on file     Attends meetings of clubs or organizations: Not on file     Relationship status: Not on file    Intimate partner violence     Fear of current or ex partner: Not on file     Emotionally abused: Not on file     Physically abused: Not on file     Forced sexual activity: Not on file   Other Topics Concern    Not on file   Social History Narrative    Not on file         ALLERGIES: Keflex [cephalexin], Pcn [penicillins], and Sulfa (sulfonamide antibiotics)    Review of Systems   Constitutional: Positive for fatigue. Negative for fever. HENT: Positive for trouble swallowing. Negative for hearing loss. Eyes: Negative for visual disturbance. Respiratory: Negative for cough and shortness of breath. Cardiovascular: Negative for chest pain. Gastrointestinal: Positive for vomiting. Negative for abdominal pain, diarrhea and nausea. Musculoskeletal: Negative for back pain. Skin: Negative for rash. Neurological: Positive for weakness. Negative for headaches. Psychiatric/Behavioral: Negative for confusion. All other systems reviewed and are negative. Vitals:    04/10/21 0848 04/10/21 0850   BP: (!) 149/92    Pulse: 90    Resp: 16    Temp: 98 °F (36.7 °C)    SpO2: 97%    Weight:  56.7 kg (125 lb)   Height:  5' (1.524 m)            Physical Exam  Vitals signs and nursing note reviewed. Constitutional:       Appearance: Normal appearance. She is well-developed. HENT:      Head: Normocephalic and atraumatic.       Nose: Nose normal.      Mouth/Throat:      Mouth: Mucous membranes are moist. Eyes:      Pupils: Pupils are equal, round, and reactive to light. Neck:      Musculoskeletal: Normal range of motion and neck supple. Cardiovascular:      Rate and Rhythm: Regular rhythm. Heart sounds: Normal heart sounds. Pulmonary:      Effort: Pulmonary effort is normal.      Breath sounds: Normal breath sounds. Abdominal:      Palpations: Abdomen is soft. Tenderness: There is no abdominal tenderness. Musculoskeletal: Normal range of motion. General: No deformity. Skin:     General: Skin is warm and dry. Neurological:      General: No focal deficit present. Mental Status: She is alert. Mental status is at baseline. Psychiatric:         Mood and Affect: Mood normal.         Behavior: Behavior normal.          MDM  Number of Diagnoses or Management Options  Diagnosis management comments: Parts of this document were created using dragon voice recognition software. The chart has been reviewed but errors may still be present. I wore appropriate PPE throughout this patient's ED visit.  Gaurav Oakes MD, 9:13 AM    Swallowed sips of water, but followed by excessive belching    11:07 AM  dw GI for consult       Amount and/or Complexity of Data Reviewed  Clinical lab tests: ordered and reviewed (Results for orders placed or performed during the hospital encounter of 04/10/21  -CBC WITH AUTOMATED DIFF       Result                      Value             Ref Range           WBC                         10.6              4.3 - 11.1 K*       RBC                         4.37              4.05 - 5.2 M*       HGB                         13.0              11.7 - 15.4 *       HCT                         40.2              35.8 - 46.3 %       MCV                         92.0              79.6 - 97.8 *       MCH                         29.7              26.1 - 32.9 *       MCHC                        32.3              31.4 - 35.0 *       RDW                         13.8              11.9 - 14.6 %       PLATELET                    322               150 - 450 K/*       MPV                         10.2              9.4 - 12.3 FL       ABSOLUTE NRBC               0.00              0.0 - 0.2 K/*       DF                          AUTOMATED                             NEUTROPHILS                 89 (H)            43 - 78 %           LYMPHOCYTES                 7 (L)             13 - 44 %           MONOCYTES                   4                 4.0 - 12.0 %        EOSINOPHILS                 0 (L)             0.5 - 7.8 %         BASOPHILS                   0                 0.0 - 2.0 %         IMMATURE GRANULOCYTES       0                 0.0 - 5.0 %         ABS. NEUTROPHILS            9.4 (H)           1.7 - 8.2 K/*       ABS. LYMPHOCYTES            0.7               0.5 - 4.6 K/*       ABS. MONOCYTES              0.4               0.1 - 1.3 K/*       ABS. EOSINOPHILS            0.0               0.0 - 0.8 K/*       ABS. BASOPHILS              0.0               0.0 - 0.2 K/*       ABS. IMM.  GRANS.            0.0               0.0 - 0.5 K/*  -METABOLIC PANEL, COMPREHENSIVE       Result                      Value             Ref Range           Sodium                      140               136 - 145 mm*       Potassium                   3.3 (L)           3.5 - 5.1 mm*       Chloride                    105               98 - 107 mmo*       CO2                         24                21 - 32 mmol*       Anion gap                   11                7 - 16 mmol/L       Glucose                     88                65 - 100 mg/*       BUN                         20                8 - 23 MG/DL        Creatinine                  0.53 (L)          0.6 - 1.0 MG*       GFR est AA                  >60               >60 ml/min/1*       GFR est non-AA              >60               >60 ml/min/1*       Calcium                     9.7               8.3 - 10.4 M*       Bilirubin, total            PENDING           MG/DL ALT (SGPT)                  18                12 - 65 U/L         AST (SGOT)                  25                15 - 37 U/L         Alk. phosphatase            PENDING           U/L                 Protein, total              PENDING           g/dL                Albumin                     4.0               3.2 - 4.6 g/*       Globulin                    PENDING           g/dL                A-G Ratio                   PENDING                          )  Tests in the radiology section of CPT®: ordered and reviewed (Xr Chest Pa Lat    Result Date: 4/10/2021  Chest 2 view CLINICAL INDICATION: Chest pain for 3 days intermittent, moderate, dysphagia, difficulty eating and taking medications. History of esophageal stricture, cardiac enlargement. COMPARISON: 3/29/2018 TECHNIQUE: Upright AP and lateral views of the chest FINDINGS: Lung volumes are hyperinflated. No radiopaque foreign body. Patient is rotated to the left. Cardiomediastinal silhouette and hilar contours are stable with aortic tortuosity, aortic calcifications, cardiac enlargement again evident and not unusual for age. Coronary artery stent material is noted. There is no pneumothorax, dense consolidation, definite pleural effusion or CHF. Overall aeration has improved since prior radiograph. Chronic biapical pleural-parenchymal scarring again noted. No acute osseous abnormalities are seen. Bone density appears low throughout.      No acute airspace disease.     )           Procedures

## 2021-04-10 NOTE — PROCEDURES
Operative Note    Patient: Ele Arzate  YOB: 1928  MRN: [id number]    Date of Procedure: 4/10/2021     Pre-Op Diagnosis: food impaction    Post-Op Diagnosis: Food impaction, Esophageal strictures (upper and lower), Esophagitis, gastritis. Procedure(s):  ESOPHAGOGASTRODUODENOSCOPY (EGD)  ENDOSCOPIC FOREIGN BODY REMOVAL    Surgeon(s):  Mason Melendez MD    Surgical Assistant: None    Anesthesia: General     Estimated Blood Loss (mL): less than 10     Complications: None    Specimens: * No specimens in log *     Implants: * No implants in log *    Findings:     Angulated esophageal inlet with a web like stricture that allowed passage of endoscope without trauma. Food bolus noted in esophagus - with grits like amorphous debris with some retained fluid seen in proximal and mid esophagus. which were removed by several passages and withdrawal with a [employer]. A large piece of impacted food bolus was seen near the EG junction. Attempts at removal with RothNet, Snare, Tripod and rat tooth forceps all resulted in multiple fragmentations. Smaller pieces removed by [employer] and aspiration. After multiple fragmentation of food bolus, smaller remaining piece passed into the stomach and was seen in the gastric lumen. Distal esophageal stricture was noted at EG junction through which the scope passed without difficulty. The scope could be easily advanced into the stomach and duodenum. Gastric antrum showed gastritis with focal erythema and specks of altered blood. Fundus, body and pylorus of stomach and duodenal bulb and descending duodenum appeared normal. Esophageal mucosa showed diffuse inflammation, most severe and circumferential just above the EG junction likely related to the food impaction.      Recommendations:   Monitor for recovery without complications. Discharge when parameters are met.   Full liquid diet advanced to purred diet when awake and alert with dysphagia precautions - avoid chunky meats, large pills, dry breads etc.  Continue PPI therapy - Omeprazole 20 mg po bid. Call Office at [phone] to arrange follow-up EGD for possible biopsy and possible dilation or follow up Office Visit with Dr. Miracle Shirley or Dr. Carrington Carter - her Primary GI MD at our Office. Can resume Eliquis tomorrow - other medications today. Will need Eliquis hold for 48 hours prior to EGD with Dilation.   Findings and recommendations were reviewed with son Carine Pierre ([phone]) who requests that he be contacted to arrange his mother's follow up.      Electronically Signed by Titi Herrera MD on 4/10/2021 at 2:06 PM

## 2021-04-10 NOTE — ED TRIAGE NOTES
Pt to triage via w/c. Pt reports choking on grits on Thursday and since then she hasn't been able to swallow well and keep her meds or foods down. Pt states she had chicken soup Thursday night but can't remember if kept it down.

## 2021-04-10 NOTE — ANESTHESIA POSTPROCEDURE EVALUATION
Procedure(s):  ESOPHAGOGASTRODUODENOSCOPY (EGD)  ENDOSCOPIC FOREIGN BODY REMOVAL. general    Anesthesia Post Evaluation      Multimodal analgesia: multimodal analgesia used between 6 hours prior to anesthesia start to PACU discharge  Patient location during evaluation: PACU  Patient participation: complete - patient participated  Level of consciousness: awake and alert  Pain management: adequate  Airway patency: patent  Anesthetic complications: no  Cardiovascular status: acceptable  Respiratory status: acceptable  Hydration status: acceptable  Post anesthesia nausea and vomiting:  none  Final Post Anesthesia Temperature Assessment:  Normothermia (36.0-37.5 degrees C)      INITIAL Post-op Vital signs:   Vitals Value Taken Time   /77 04/10/21 1445   Temp 36.7 °C (98.1 °F) 04/10/21 1411   Pulse 104 04/10/21 1504   Resp 14 04/10/21 1433   SpO2 92 % 04/10/21 1504   Vitals shown include unvalidated device data.

## 2021-05-07 NOTE — PROGRESS NOTES
Informed patient of 1100 arrival time for 1230 procedure. Informed patient of COVID protocols. Informed patient that they must have a  with them the entire time that they are in the hospital. Patient verbalized understanding.

## 2021-05-10 ENCOUNTER — ANESTHESIA (OUTPATIENT)
Dept: ENDOSCOPY | Age: 86
End: 2021-05-10
Payer: MEDICARE

## 2021-05-10 ENCOUNTER — ANESTHESIA EVENT (OUTPATIENT)
Dept: ENDOSCOPY | Age: 86
End: 2021-05-10
Payer: MEDICARE

## 2021-05-10 ENCOUNTER — HOSPITAL ENCOUNTER (OUTPATIENT)
Age: 86
Setting detail: OUTPATIENT SURGERY
Discharge: HOME | End: 2021-05-10
Attending: INTERNAL MEDICINE | Admitting: INTERNAL MEDICINE
Payer: MEDICARE

## 2021-05-10 VITALS
OXYGEN SATURATION: 98 % | DIASTOLIC BLOOD PRESSURE: 78 MMHG | HEIGHT: 64 IN | TEMPERATURE: 98 F | SYSTOLIC BLOOD PRESSURE: 146 MMHG | BODY MASS INDEX: 22.2 KG/M2 | HEART RATE: 75 BPM | WEIGHT: 130 LBS | RESPIRATION RATE: 16 BRPM

## 2021-05-10 PROCEDURE — 74011250636 HC RX REV CODE- 250/636: Performed by: ANESTHESIOLOGY

## 2021-05-10 PROCEDURE — 76040000025: Performed by: INTERNAL MEDICINE

## 2021-05-10 PROCEDURE — 2709999900 HC NON-CHARGEABLE SUPPLY: Performed by: INTERNAL MEDICINE

## 2021-05-10 PROCEDURE — 74011250636 HC RX REV CODE- 250/636: Performed by: NURSE ANESTHETIST, CERTIFIED REGISTERED

## 2021-05-10 PROCEDURE — C1726 CATH, BAL DIL, NON-VASCULAR: HCPCS | Performed by: INTERNAL MEDICINE

## 2021-05-10 PROCEDURE — 74011000250 HC RX REV CODE- 250: Performed by: NURSE ANESTHETIST, CERTIFIED REGISTERED

## 2021-05-10 PROCEDURE — 76060000031 HC ANESTHESIA FIRST 0.5 HR: Performed by: INTERNAL MEDICINE

## 2021-05-10 RX ORDER — LIDOCAINE HYDROCHLORIDE 20 MG/ML
INJECTION, SOLUTION EPIDURAL; INFILTRATION; INTRACAUDAL; PERINEURAL AS NEEDED
Status: DISCONTINUED | OUTPATIENT
Start: 2021-05-10 | End: 2021-05-10 | Stop reason: HOSPADM

## 2021-05-10 RX ORDER — PROPOFOL 10 MG/ML
INJECTION, EMULSION INTRAVENOUS AS NEEDED
Status: DISCONTINUED | OUTPATIENT
Start: 2021-05-10 | End: 2021-05-10 | Stop reason: HOSPADM

## 2021-05-10 RX ORDER — SODIUM CHLORIDE, SODIUM LACTATE, POTASSIUM CHLORIDE, CALCIUM CHLORIDE 600; 310; 30; 20 MG/100ML; MG/100ML; MG/100ML; MG/100ML
1000 INJECTION, SOLUTION INTRAVENOUS CONTINUOUS
Status: DISCONTINUED | OUTPATIENT
Start: 2021-05-10 | End: 2021-05-10 | Stop reason: HOSPADM

## 2021-05-10 RX ADMIN — LIDOCAINE HYDROCHLORIDE 80 MG: 20 INJECTION, SOLUTION EPIDURAL; INFILTRATION; INTRACAUDAL; PERINEURAL at 12:15

## 2021-05-10 RX ADMIN — SODIUM CHLORIDE, SODIUM LACTATE, POTASSIUM CHLORIDE, AND CALCIUM CHLORIDE: 600; 310; 30; 20 INJECTION, SOLUTION INTRAVENOUS at 12:06

## 2021-05-10 RX ADMIN — PROPOFOL 30 MG: 10 INJECTION, EMULSION INTRAVENOUS at 12:21

## 2021-05-10 RX ADMIN — PROPOFOL 40 MG: 10 INJECTION, EMULSION INTRAVENOUS at 12:15

## 2021-05-10 RX ADMIN — PROPOFOL 20 MG: 10 INJECTION, EMULSION INTRAVENOUS at 12:16

## 2021-05-10 RX ADMIN — PROPOFOL 20 MG: 10 INJECTION, EMULSION INTRAVENOUS at 12:17

## 2021-05-10 NOTE — PROCEDURES
Endoscopic Gastroduodenoscopy Procedure Note    Indications: Dysphagia, esophageal stricture    Anesthesia/Sedation: MAC IV     Pre-Procedure Physical:    Current Facility-Administered Medications   Medication Dose Route Frequency    lactated Ringers infusion 1,000 mL  1,000 mL IntraVENous CONTINUOUS     Facility-Administered Medications Ordered in Other Encounters   Medication Dose Route Frequency    propofoL (DIPRIVAN) 10 mg/mL injection    PRN      Keflex [cephalexin], Pcn [penicillins], and Sulfa (sulfonamide antibiotics)    Patient Vitals for the past 8 hrs:   BP Temp Pulse Resp SpO2 Height Weight   05/10/21 1209 (!) 170/73  83 18 97 %     05/10/21 1135 (!) 149/83 98.4 °F (36.9 °C) 87 18 98 % 5' 4\" (1.626 m) 59 kg (130 lb)       Exam      Airway: clear   Heart: normal S1and S2    Lungs: clear bilateral  Abdomen: soft, nontender, bowel sounds present and normal in all quads   Mental Status: awake, alert and oriented to person, place and time          Procedure Details     Informed consent was obtained for the procedure, including conscious sedation. Risks of pancreatitis, infection, perforation, hemorrhage, adverse drug reaction and aspiration were discussed. The patient was placed in the left lateral decubitus position. Based on the pre-procedure assessment, including review of the patient's medical history, medications, allergies, and review of systems, she had been deemed to be an appropriate candidate for conscious sedation; she was therefore sedated with the medications listed below. She was monitored continuously with ECG tracing, pulse oximetry, blood pressure monitoring, and direct observation. The EGD gastroscope was inserted into the mouth and advanced under direct vision to the second portion of the duodenum. A careful inspection was made as the gastroscope was withdrawn, including a retroflexed view of the proximal stomach; findings and interventions are described below.  Appropriate photodocumentation was obtained. Findings:   Esophagus- Tight web-like UES stricture. Scope traversed with moderate pressure. This was dilated with the balloon to 15 mm. Resulted in mild heme. Also mild distal esophageal stricture. Dilated with balloon to 18 mm. Resulted in mild mucosal tear. Stomach- Small hiatal hernia. Duodenum- Normal.    Therapies: Dilation    Specimens: None    Estimated Blood Loss: 0 cc           Complications:   None; patient tolerated the procedure well. Attending Attestation:  I performed the procedure. Impression:    UES stricture. Dilated to 15 mm. Distal esophageal/ GEJ stricture. Dilated to 18 mm.     Recommendations:  Repeat dilation prn  Back to office in 1 mo

## 2021-05-10 NOTE — H&P
[facility] Consult Note           Referring Physician:     Consult Date: 5/10/2021    Reason for Consult: Dysphagia, esophageal strciture    History of Present Illness:  Patient is a 80 y.o. female who is seen in consultation for Dysphagia, esophageal strciture. Past Medical History:   Diagnosis Date    Arthritis     Atrial fibrillation ([address]     CAD (coronary artery disease)     CHF (congestive heart failure) (HCC)     chronic systolic dysfunction    Current use of long term anticoagulation     Eliquis and Aspirin    Depression     Fibromyalgia     H/O heart artery stent     last stent placed over a year ago. Pt has 2 stents    History of COVID-19 dx:2/2021    pt not hospitalized    History of gastric ulcer     Hypertension     Hypothyroidism     Knee pain, left     Neuropathy     PUD (peptic ulcer disease)       Past Surgical History:   Procedure Laterality Date    HX APPENDECTOMY      HX CARPAL TUNNEL RELEASE      HX CHOLECYSTECTOMY      HX COLONOSCOPY      HX ENDOSCOPY      HX GI      EGD; food bolus 2021    HX HEART CATHETERIZATION      stent X2    HX KNEE REPLACEMENT Right       Family History   Problem Relation Age of Onset    Hypertension Other      Social History     Occupational History    Not on file   Tobacco Use    Smoking status: Never Smoker    Smokeless tobacco: Never Used   Substance and Sexual Activity    Alcohol use: No    Drug use: No    Sexual activity: Not on file       Hospital Medications:  Current Facility-Administered Medications   Medication Dose Route Frequency    lactated Ringers infusion 1,000 mL  1,000 mL IntraVENous CONTINUOUS       Allergies: Allergies   Allergen Reactions    Keflex [Cephalexin] Other (comments)    Pcn [Penicillins] Rash    Sulfa (Sulfonamide Antibiotics) Rash       Review of Systems:  A comprehensive review of systems was negative except for that written in the History of Present Illness.     Objective: Physical Exam:  Vitals:  Visit Vitals  BP (!) 149/83   Pulse 87   Temp 98.4 °F (36.9 °C)   Resp 18   Ht 5' 4\" (1.626 m)   Wt 59 kg (130 lb)   SpO2 98%   BMI 22.31 kg/m²       General: No acute distress. Skin:  Extremities and face reveal no rashes. No paiz erythema. No telangiectasias on the chest wall. HEENT: Sclerae anicteric. No oral ulcers. No abnormal pigmentation of the lips. The neck is supple. Cardiovascular: Regular rate and rhythm. No murmurs, gallops, or rubs. Respiratory:  Comfortable breathing  With no accessory muscle use. Clear breath sounds with no wheezes, rales, or rhonchi. GI:  Abdomen nondistended, soft, and nontender. Normal active bowel sounds. No enlargement of the liver or spleen. No masses palpable. Musculoskeletal:  No pitting edema of the lower legs. Extremities have good range of motion. Neurological:  Gross memory appears intact. Patient is alert and oriented. Psychiatric:  Mood appears appropriate with judgement intact. Lymphatic:  No cervical or supraclavicular adenopathy. Laboratory:    No results for input(s): WBC, RBC, HGB, HCT, PLT, HGBEXT, HCTEXT, PLTEXT in the last 72 hours. No results for input(s): GLU, NA, K, CL, CO2, BUN, CREA, CA in the last 72 hours. No results for input(s): PTP, INR, APTT, INREXT in the last 72 hours. No results for input(s): TBIL, CBIL, AP, ALB, TP, AML, LPSE in the last 72 hours.     No lab exists for component: SGOT, GPT    Assessment:       A 80 y.o. female with Dysphagia, esophageal strciture      Plan:       EGD with dilation    Signed By: Bianca Head MD     May 10, 2021

## 2021-05-10 NOTE — ANESTHESIA PREPROCEDURE EVALUATION
Relevant Problems   CARDIOVASCULAR   (+) Atrial fibrillation with RVR (HCC)   (+) CAD (coronary artery disease)   (+) CHF (congestive heart failure) (HCC)   (+) HTN (hypertension)       Anesthetic History               Review of Systems / Medical History  Patient summary reviewed and pertinent labs reviewed    Pulmonary                   Neuro/Psych              Cardiovascular    Hypertension  Valvular problems/murmurs: aortic stenosis    CHF  Dysrhythmias : atrial fibrillation  CAD and cardiac stents (? 5 years ago)  Pertinent negatives: Angina: hx low EF.   Exercise tolerance: >4 METS  Comments: EF 50%, Mild AS   GI/Hepatic/Renal               Comments: Esophageal food bolus Endo/Other      Hypothyroidism       Other Findings            Physical Exam    Airway  Mallampati: III    Neck ROM: decreased range of motion        Cardiovascular    Rhythm: irregular      Murmur: Grade 2, Aortic area     Dental         Pulmonary  Breath sounds clear to auscultation               Abdominal         Other Findings            Anesthetic Plan    ASA: 4, emergent  Anesthesia type: general            Anesthetic plan and risks discussed with: Patient

## 2021-05-10 NOTE — DISCHARGE INSTRUCTIONS
Gastrointestinal Esophagogastroduodenoscopy (EGD)/ Endoscopic Ultrasound(EUS)- Upper Exam Discharge Instructions    1. Call Dr. Radha Whitmore  for any problems or questions. 2. Contact the doctor's office for follow up appointment as directed. 3. Medication may cause drowsiness for several hours, therefore, do not drive or operate machinery for remainder of the day. 4. No alcohol today. 5. Do not make any important decisions such as signing legal paperwork. 6. Ordinarily, you may resume regular diet and activity after exam unless otherwise specified by your physician. 7. For mild soreness in your throat you may use Cepacol throat lozenges or warm  salt-water gargles as needed. Any additional instructions:   1. Follow up office visit in one month. 2. Restart Eliquis tomorrow, [id number], 2021        Instructions given to Oliverio Gilbert and other family members.

## 2021-05-10 NOTE — ROUTINE PROCESS
Discharge instructions provided to and gone over with patient and family member by Tiesha Beard, [address] [place] [address]. Opportunity for questions provided, all answered. Patient voices no complaints or concerns at this time. Patient wheeled to car by staff and discharged home with family member.

## 2022-01-01 ENCOUNTER — PREP FOR PROCEDURE (OUTPATIENT)
Dept: ADMINISTRATIVE | Age: 87
End: 2022-01-01

## 2022-01-01 RX ORDER — SODIUM CHLORIDE 0.9 % (FLUSH) 0.9 %
5-40 SYRINGE (ML) INJECTION PRN
OUTPATIENT
Start: 2022-01-01

## 2022-01-01 RX ORDER — SODIUM CHLORIDE 9 MG/ML
INJECTION, SOLUTION INTRAVENOUS PRN
OUTPATIENT
Start: 2022-01-01

## 2022-01-01 RX ORDER — SODIUM CHLORIDE 0.9 % (FLUSH) 0.9 %
5-40 SYRINGE (ML) INJECTION EVERY 12 HOURS SCHEDULED
OUTPATIENT
Start: 2022-01-01

## 2022-03-18 PROBLEM — Z79.01 ANTICOAGULANT LONG-TERM USE: Status: ACTIVE | Noted: 2018-06-28

## 2022-03-19 PROBLEM — I48.91 ATRIAL FIBRILLATION WITH RVR (HCC): Status: ACTIVE | Noted: 2018-03-29

## 2022-03-19 PROBLEM — I50.9 CHF (CONGESTIVE HEART FAILURE) (HCC): Status: ACTIVE | Noted: 2018-03-29

## 2022-04-01 ENCOUNTER — HOSPITAL ENCOUNTER (EMERGENCY)
Age: 87
Discharge: HOME | End: 2022-04-01
Attending: EMERGENCY MEDICINE
Payer: MEDICARE

## 2022-04-01 ENCOUNTER — APPOINTMENT (OUTPATIENT)
Dept: GENERAL RADIOLOGY | Age: 87
End: 2022-04-01
Attending: PHYSICIAN ASSISTANT
Payer: MEDICARE

## 2022-04-01 VITALS
TEMPERATURE: 98.3 F | RESPIRATION RATE: 12 BRPM | HEART RATE: 68 BPM | OXYGEN SATURATION: 99 % | SYSTOLIC BLOOD PRESSURE: 129 MMHG | DIASTOLIC BLOOD PRESSURE: 66 MMHG

## 2022-04-01 DIAGNOSIS — R53.1 WEAKNESS: Primary | ICD-10-CM

## 2022-04-01 DIAGNOSIS — N39.0 URINARY TRACT INFECTION WITHOUT HEMATURIA, SITE UNSPECIFIED: ICD-10-CM

## 2022-04-01 LAB
ALBUMIN SERPL-MCNC: 3.2 G/DL (ref 3.2–4.6)
ALBUMIN/GLOB SERPL: 1 {RATIO} (ref 1.2–3.5)
ALP SERPL-CCNC: 58 U/L (ref 50–136)
ALT SERPL-CCNC: 16 U/L (ref 12–65)
AMORPH CRY URNS QL MICRO: ABNORMAL
ANION GAP SERPL CALC-SCNC: 4 MMOL/L (ref 7–16)
APPEARANCE UR: ABNORMAL
AST SERPL-CCNC: 21 U/L (ref 15–37)
ATRIAL RATE: 72 BPM
BACTERIA URNS QL MICRO: ABNORMAL /HPF
BASOPHILS # BLD: 0 K/UL (ref 0–0.2)
BASOPHILS NFR BLD: 1 % (ref 0–2)
BILIRUB SERPL-MCNC: 0.3 MG/DL (ref 0.2–1.1)
BILIRUB UR QL: NEGATIVE
BNP SERPL-MCNC: 491 PG/ML
BUN SERPL-MCNC: 18 MG/DL (ref 8–23)
CALCIUM SERPL-MCNC: 9.6 MG/DL (ref 8.3–10.4)
CALCULATED P AXIS, ECG09: -81 DEGREES
CALCULATED R AXIS, ECG10: -46 DEGREES
CALCULATED T AXIS, ECG11: 114 DEGREES
CASTS URNS QL MICRO: ABNORMAL /LPF
CHLORIDE SERPL-SCNC: 104 MMOL/L (ref 98–107)
CO2 SERPL-SCNC: 31 MMOL/L (ref 21–32)
COLOR UR: YELLOW
COVID-19 RAPID TEST, COVR: NOT DETECTED
CREAT SERPL-MCNC: 0.5 MG/DL (ref 0.6–1)
DIAGNOSIS, 93000: NORMAL
DIFFERENTIAL METHOD BLD: ABNORMAL
EOSINOPHIL # BLD: 0.1 K/UL (ref 0–0.8)
EOSINOPHIL NFR BLD: 1 % (ref 0.5–7.8)
EPI CELLS #/AREA URNS HPF: ABNORMAL /HPF
ERYTHROCYTE [DISTWIDTH] IN BLOOD BY AUTOMATED COUNT: 13.6 % (ref 11.9–14.6)
GLOBULIN SER CALC-MCNC: 3.2 G/DL (ref 2.3–3.5)
GLUCOSE SERPL-MCNC: 93 MG/DL (ref 65–100)
GLUCOSE UR STRIP.AUTO-MCNC: NEGATIVE MG/DL
HCT VFR BLD AUTO: 34.9 % (ref 35.8–46.3)
HGB BLD-MCNC: 11.1 G/DL (ref 11.7–15.4)
HGB UR QL STRIP: NEGATIVE
IMM GRANULOCYTES # BLD AUTO: 0 K/UL (ref 0–0.5)
IMM GRANULOCYTES NFR BLD AUTO: 1 % (ref 0–5)
KETONES UR QL STRIP.AUTO: NEGATIVE MG/DL
LEUKOCYTE ESTERASE UR QL STRIP.AUTO: NEGATIVE
LYMPHOCYTES # BLD: 0.9 K/UL (ref 0.5–4.6)
LYMPHOCYTES NFR BLD: 11 % (ref 13–44)
MCH RBC QN AUTO: 30.6 PG (ref 26.1–32.9)
MCHC RBC AUTO-ENTMCNC: 31.8 G/DL (ref 31.4–35)
MCV RBC AUTO: 96.1 FL (ref 79.6–97.8)
MONOCYTES # BLD: 0.7 K/UL (ref 0.1–1.3)
MONOCYTES NFR BLD: 9 % (ref 4–12)
NEUTS SEG # BLD: 6.7 K/UL (ref 1.7–8.2)
NEUTS SEG NFR BLD: 78 % (ref 43–78)
NITRITE UR QL STRIP.AUTO: POSITIVE
NRBC # BLD: 0 K/UL (ref 0–0.2)
OTHER OBSERVATIONS,UCOM: ABNORMAL
P-R INTERVAL, ECG05: 217 MS
PH UR STRIP: 7 [PH] (ref 5–9)
PLATELET # BLD AUTO: 404 K/UL (ref 150–450)
PMV BLD AUTO: 10.1 FL (ref 9.4–12.3)
POTASSIUM SERPL-SCNC: 4.4 MMOL/L (ref 3.5–5.1)
PROT SERPL-MCNC: 6.4 G/DL (ref 6.3–8.2)
PROT UR STRIP-MCNC: NEGATIVE MG/DL
Q-T INTERVAL, ECG07: 459 MS
QRS DURATION, ECG06: 169 MS
QTC CALCULATION (BEZET), ECG08: 499 MS
RBC # BLD AUTO: 3.63 M/UL (ref 4.05–5.2)
RBC #/AREA URNS HPF: ABNORMAL /HPF
SODIUM SERPL-SCNC: 139 MMOL/L (ref 136–145)
SOURCE, COVRS: NORMAL
SP GR UR REFRACTOMETRY: 1.02 (ref 1–1.02)
TROPONIN-HIGH SENSITIVITY: 8.2 PG/ML (ref 0–14)
UROBILINOGEN UR QL STRIP.AUTO: 0.2 EU/DL (ref 0.2–1)
VENTRICULAR RATE, ECG03: 71 BPM
WBC # BLD AUTO: 8.5 K/UL (ref 4.3–11.1)
WBC URNS QL MICRO: ABNORMAL /HPF

## 2022-04-01 PROCEDURE — 81001 URINALYSIS AUTO W/SCOPE: CPT

## 2022-04-01 PROCEDURE — 80053 COMPREHEN METABOLIC PANEL: CPT

## 2022-04-01 PROCEDURE — 99285 EMERGENCY DEPT VISIT HI MDM: CPT

## 2022-04-01 PROCEDURE — 84484 ASSAY OF TROPONIN QUANT: CPT

## 2022-04-01 PROCEDURE — 83880 ASSAY OF NATRIURETIC PEPTIDE: CPT

## 2022-04-01 PROCEDURE — 87086 URINE CULTURE/COLONY COUNT: CPT

## 2022-04-01 PROCEDURE — 93005 ELECTROCARDIOGRAM TRACING: CPT | Performed by: PHYSICIAN ASSISTANT

## 2022-04-01 PROCEDURE — 87635 SARS-COV-2 COVID-19 AMP PRB: CPT

## 2022-04-01 PROCEDURE — 85025 COMPLETE CBC W/AUTO DIFF WBC: CPT

## 2022-04-01 PROCEDURE — 71045 X-RAY EXAM CHEST 1 VIEW: CPT

## 2022-04-01 RX ORDER — CEFDINIR 300 MG/1
300 CAPSULE ORAL 2 TIMES DAILY
Qty: 14 CAPSULE | Refills: 0 | Status: SHIPPED | OUTPATIENT
Start: 2022-04-01 | End: 2022-04-01 | Stop reason: SDUPTHER

## 2022-04-01 RX ORDER — CEFDINIR 300 MG/1
300 CAPSULE ORAL 2 TIMES DAILY
Qty: 14 CAPSULE | Refills: 0 | Status: SHIPPED | OUTPATIENT
Start: 2022-04-01 | End: 2022-04-08

## 2022-04-01 NOTE — ED TRIAGE NOTES
Patient arrives in wheelchair to triage. Masked. Reports she was sent from [address] [place] [address] [place] for generalized weakness, poor appetite for several days. Daughter reports patient lethargic and falling asleep frequently. Patient without any specific complaints.

## 2022-04-01 NOTE — DISCHARGE INSTRUCTIONS
Start cefdinir tonight. See your family physician for recheck early this coming week.   Feel free to return to the ER sooner for worsening weakness or if you develop a fever

## 2022-04-01 NOTE — ED NOTES
Family reports increased lethargy x1 1/2 weeks. Covid negative on last Friday. Decreased PO intake. Denies any Abd or CP.

## 2022-04-01 NOTE — ED NOTES
I have reviewed discharge instructions with the patient and caregiver. The patient and caregiver verbalized understanding. Patient left ED via Discharge Method: wheelchair to Home with caregiver. Opportunity for questions and clarification provided. Patient given 1 e-scripts. To continue your aftercare when you leave the hospital, you may receive an automated call from our care team to check in on how you are doing. This is a free service and part of our promise to provide the best care and service to meet your aftercare needs.  If you have questions, or wish to unsubscribe from this service please call [phone]. Thank you for Choosing our [facility] Emergency Department.

## 2022-04-01 NOTE — ED PROVIDER NOTES
Patient presents to the emergency department accompanied by her daughter-in-law who states they were at  earlier today and was told to go to the ER for further evaluation based on her symptoms. Over the past week and a half patient has been more generally weak and fatigued. Over the past few days she has had decreased appetite with poor intake. She denies any fever, chest pain or shortness of breath. Patient states she was recently put on a Z-Keegan for a cough last week and per records in [facility] she was prescribed this on March 28 but states she only took it a few days due to itching and then stopped. Patient has a history of congestive heart failure and is on Lasix and does admit that her legs have been more swollen recently. She also has a history of hypertension, peptic ulcer disease, coronary artery disease, atrial fibrillation, fibromyalgia, hypothyroidism and neuropathy. Patient denies any abdominal pain, vomiting or diarrhea. She denies any foul-smelling urine. Patient denies any headache, confusion, unilateral motor weakness or vertigo symptoms. Past Medical History:   Diagnosis Date    Arthritis     Atrial fibrillation ([address]     CAD (coronary artery disease)     [facility])     chronic systolic dysfunction    Current use of long term anticoagulation     Eliquis and Aspirin    Depression     Fibromyalgia     H/O heart artery stent     last stent placed over a year ago.  Pt has 2 stents    History of COVID-19 dx:2/2021    pt not hospitalized    History of gastric ulcer     Hypertension     Hypothyroidism     Knee pain, left     Neuropathy     PUD (peptic ulcer disease)        Past Surgical History:   Procedure Laterality Date    HX APPENDECTOMY      HX CARPAL TUNNEL RELEASE      HX CHOLECYSTECTOMY      HX COLONOSCOPY      HX ENDOSCOPY      HX GI      EGD; food bolus 2021    HX HEART CATHETERIZATION      stent X2    HX KNEE REPLACEMENT Right Family History:   Problem Relation Age of Onset    Hypertension Other        Social History     Socioeconomic History    Marital status:      Spouse name: Not on file    Number of children: Not on file    Years of education: Not on file    Highest education level: Not on file   Occupational History    Not on file   Tobacco Use    Smoking status: Never Smoker    Smokeless tobacco: Never Used   Vaping Use    Vaping Use: Never used   Substance and Sexual Activity    Alcohol use: No    Drug use: No    Sexual activity: Not on file   Other Topics Concern    Not on file   Social History Narrative    Not on file     Social Determinants of Health     Financial Resource Strain:     Difficulty of Paying Living Expenses: Not on file   Food Insecurity:     Worried About [address] in the Last Year: Not on file    Juliet of Food in the Last Year: Not on file   Transportation Needs:     Lack of Transportation (Medical): Not on file    Lack of Transportation (Non-Medical):  Not on file   Physical Activity:     Days of Exercise per Week: Not on file    Minutes of Exercise per Session: Not on file   Stress:     Feeling of Stress : Not on file   Social Connections:     Frequency of Communication with Friends and Family: Not on file    Frequency of Social Gatherings with Friends and Family: Not on file    Attends Congregational Services: Not on file    Active Member of [address] [place] [address] [employer] or Organizations: Not on file    Attends Club or Organization Meetings: Not on file    Marital Status: Not on file   Intimate Partner Violence:     Fear of Current or Ex-Partner: Not on file    Emotionally Abused: Not on file    Physically Abused: Not on file    Sexually Abused: Not on file   Housing Stability:     Unable to Pay for Housing in the Last Year: Not on file    Number of [place] in the Last Year: Not on file    Unstable Housing in the Last Year: Not on file         ALLERGIES: Keflex [cephalexin], Pcn [penicillins], and Sulfa (sulfonamide antibiotics)    Review of Systems   Respiratory: Positive for cough. Negative for shortness of breath. Musculoskeletal:        Increased swelling in the lower legs over the past week   Neurological: Positive for weakness. All other systems reviewed and are negative. Vitals:    04/01/22 1300 04/01/22 1342 04/01/22 1346   BP: (!) 108/57  (!) 108/56   Pulse: 75  69   Resp:   12   Temp: 98.3 °F (36.8 °C)     SpO2: 96% 93% 95%            Physical Exam  Vitals and nursing note reviewed. Constitutional:       Appearance: Normal appearance. HENT:      Head: Normocephalic and atraumatic. Nose: Nose normal.      Mouth/Throat:      Mouth: Mucous membranes are moist.   Eyes:      Extraocular Movements: Extraocular movements intact. Conjunctiva/sclera: Conjunctivae normal.      Pupils: Pupils are equal, round, and reactive to light. Cardiovascular:      Rate and Rhythm: Normal rate and regular rhythm. Pulses: Normal pulses. Heart sounds: Normal heart sounds. Pulmonary:      Effort: Pulmonary effort is normal.      Breath sounds: Normal breath sounds. Abdominal:      General: Abdomen is flat. There is no distension. Palpations: Abdomen is soft. Tenderness: There is no abdominal tenderness. There is no guarding. Musculoskeletal:         General: Normal range of motion. Cervical back: Normal range of motion and neck supple. Skin:     General: Skin is warm and dry. Capillary Refill: Capillary refill takes less than 2 seconds. Neurological:      General: No focal deficit present. Mental Status: She is alert. Mental status is at baseline. Cranial Nerves: No cranial nerve deficit. Motor: No weakness. Psychiatric:         Mood and Affect: Mood normal.         Behavior: Behavior normal.         Thought Content:  Thought content normal.          MDM  Number of Diagnoses or Management Options  Urinary tract infection without hematuria, site unspecified  Weakness  Diagnosis management comments: Differential diagnoses: Electrolyte abnormality, pneumonia, CHF exacerbation, urinary tract infection, arrhythmia, angina    Patient's urine does appear infected as it is nitrite positive and has 4+ bacteria. I suspect that this is the cause of her fatigue. Initially the computer system mention she is allergic to Keflex however when I discussed this allergy with her it was not a true allergy and simply that it was [place] strong antibiotic that wiped out bacteria\" she will be given a prescription for cefdinir. I will have patient see her primary care physician for follow-up on Monday. I discussed case with ER attending Dr. Katarzyna Villeda who was in agreement with management plan       Amount and/or Complexity of Data Reviewed  Clinical lab tests: reviewed  Tests in the radiology section of CPT®: reviewed  Discuss the patient with other providers: yes           EKG    Date/Time: 4/1/2022 11:10 PM  Performed by: ARTUR Alvarez  Authorized by: ARTUR Alvarez     ECG reviewed by ED Physician in the absence of a cardiologist: yes    Previous ECG:     Previous ECG:  Unavailable  Interpretation:     Interpretation: non-specific    Comments:      EKG was performed at 1357 which shows sinus rhythm with left bundle branch block.   EKG appears somewhat similar to prior rhythm strips in the computer system

## 2022-04-04 LAB
BACTERIA SPEC CULT: NORMAL
BACTERIA SPEC CULT: NORMAL
SERVICE CMNT-IMP: NORMAL

## 2022-07-04 ENCOUNTER — HOSPITAL ENCOUNTER (EMERGENCY)
Age: 87
Discharge: HOME | End: 2022-07-04
Payer: MEDICARE

## 2022-07-04 VITALS
DIASTOLIC BLOOD PRESSURE: 65 MMHG | RESPIRATION RATE: 18 BRPM | HEART RATE: 77 BPM | TEMPERATURE: 97.9 F | WEIGHT: 110 LBS | BODY MASS INDEX: 18.78 KG/M2 | SYSTOLIC BLOOD PRESSURE: 145 MMHG | HEIGHT: 64 IN | OXYGEN SATURATION: 97 %

## 2022-07-04 DIAGNOSIS — R31.9 HEMATURIA, UNSPECIFIED TYPE: Primary | ICD-10-CM

## 2022-07-04 DIAGNOSIS — R31.9 URINARY TRACT INFECTION WITH HEMATURIA, SITE UNSPECIFIED: ICD-10-CM

## 2022-07-04 DIAGNOSIS — N39.0 URINARY TRACT INFECTION WITH HEMATURIA, SITE UNSPECIFIED: ICD-10-CM

## 2022-07-04 LAB
ALBUMIN SERPL-MCNC: 3.6 G/DL (ref 3.2–4.6)
ALBUMIN/GLOB SERPL: 1.2 {RATIO} (ref 1.2–3.5)
ALP SERPL-CCNC: 62 U/L (ref 50–136)
ALT SERPL-CCNC: 20 U/L (ref 12–65)
ANION GAP SERPL CALC-SCNC: 4 MMOL/L (ref 7–16)
APPEARANCE UR: ABNORMAL
AST SERPL-CCNC: 24 U/L (ref 15–37)
BACTERIA URNS QL MICRO: ABNORMAL /HPF
BASOPHILS # BLD: 0.1 K/UL (ref 0–0.2)
BASOPHILS NFR BLD: 1 % (ref 0–2)
BILIRUB SERPL-MCNC: 0.3 MG/DL (ref 0.2–1.1)
BILIRUB UR QL: NEGATIVE
BUN SERPL-MCNC: 35 MG/DL (ref 8–23)
CALCIUM SERPL-MCNC: 9.5 MG/DL (ref 8.3–10.4)
CHLORIDE SERPL-SCNC: 105 MMOL/L (ref 98–107)
CO2 SERPL-SCNC: 30 MMOL/L (ref 21–32)
COLOR UR: ABNORMAL
CREAT SERPL-MCNC: 0.9 MG/DL (ref 0.6–1)
DIFFERENTIAL METHOD BLD: ABNORMAL
EOSINOPHIL # BLD: 0.2 K/UL (ref 0–0.8)
EOSINOPHIL NFR BLD: 2 % (ref 0.5–7.8)
EPI CELLS #/AREA URNS HPF: ABNORMAL /HPF
ERYTHROCYTE [DISTWIDTH] IN BLOOD BY AUTOMATED COUNT: 14.7 % (ref 11.9–14.6)
GLOBULIN SER CALC-MCNC: 3.1 G/DL (ref 2.3–3.5)
GLUCOSE SERPL-MCNC: 141 MG/DL (ref 65–100)
GLUCOSE UR STRIP.AUTO-MCNC: NEGATIVE MG/DL
HCT VFR BLD AUTO: 34.3 % (ref 35.8–46.3)
HGB BLD-MCNC: 11 G/DL (ref 11.7–15.4)
HGB UR QL STRIP: ABNORMAL
IMM GRANULOCYTES # BLD AUTO: 0 K/UL (ref 0–0.5)
IMM GRANULOCYTES NFR BLD AUTO: 0 % (ref 0–5)
KETONES UR QL STRIP.AUTO: ABNORMAL MG/DL
LEUKOCYTE ESTERASE UR QL STRIP.AUTO: ABNORMAL
LYMPHOCYTES # BLD: 1 K/UL (ref 0.5–4.6)
LYMPHOCYTES NFR BLD: 13 % (ref 13–44)
MCH RBC QN AUTO: 29.7 PG (ref 26.1–32.9)
MCHC RBC AUTO-ENTMCNC: 32.1 G/DL (ref 31.4–35)
MCV RBC AUTO: 92.7 FL (ref 79.6–97.8)
MONOCYTES # BLD: 0.6 K/UL (ref 0.1–1.3)
MONOCYTES NFR BLD: 8 % (ref 4–12)
NEUTS SEG # BLD: 6.1 K/UL (ref 1.7–8.2)
NEUTS SEG NFR BLD: 76 % (ref 43–78)
NITRITE UR QL STRIP.AUTO: NEGATIVE
NRBC # BLD: 0 K/UL (ref 0–0.2)
OTHER OBSERVATIONS: ABNORMAL
PH UR STRIP: 7 [PH] (ref 5–9)
PLATELET # BLD AUTO: 320 K/UL (ref 150–450)
PMV BLD AUTO: 10.3 FL (ref 9.4–12.3)
POTASSIUM SERPL-SCNC: 4.2 MMOL/L (ref 3.5–5.1)
PROT SERPL-MCNC: 6.7 G/DL (ref 6.3–8.2)
PROT UR STRIP-MCNC: 300 MG/DL
RBC # BLD AUTO: 3.7 M/UL (ref 4.05–5.2)
RBC #/AREA URNS HPF: ABNORMAL /HPF
SODIUM SERPL-SCNC: 139 MMOL/L (ref 136–145)
SP GR UR REFRACTOMETRY: 1.02 (ref 1–1.02)
UROBILINOGEN UR QL STRIP.AUTO: 1 EU/DL (ref 0.2–1)
WBC # BLD AUTO: 8 K/UL (ref 4.3–11.1)
WBC URNS QL MICRO: ABNORMAL /HPF

## 2022-07-04 PROCEDURE — 80053 COMPREHEN METABOLIC PANEL: CPT

## 2022-07-04 PROCEDURE — 87086 URINE CULTURE/COLONY COUNT: CPT

## 2022-07-04 PROCEDURE — 81001 URINALYSIS AUTO W/SCOPE: CPT

## 2022-07-04 PROCEDURE — 99284 EMERGENCY DEPT VISIT MOD MDM: CPT

## 2022-07-04 PROCEDURE — 2580000003 HC RX 258

## 2022-07-04 PROCEDURE — 85025 COMPLETE CBC W/AUTO DIFF WBC: CPT

## 2022-07-04 RX ORDER — 0.9 % SODIUM CHLORIDE 0.9 %
500 INTRAVENOUS SOLUTION INTRAVENOUS
Status: COMPLETED | OUTPATIENT
Start: 2022-07-04 | End: 2022-07-04

## 2022-07-04 RX ORDER — CEFPODOXIME PROXETIL 100 MG/1
100 TABLET, FILM COATED ORAL 2 TIMES DAILY
Qty: 20 TABLET | Refills: 0 | Status: SHIPPED | OUTPATIENT
Start: 2022-07-04 | End: 2022-07-14

## 2022-07-04 RX ADMIN — SODIUM CHLORIDE 500 ML: 9 INJECTION, SOLUTION INTRAVENOUS at 04:28

## 2022-07-04 ASSESSMENT — PAIN - FUNCTIONAL ASSESSMENT
PAIN_FUNCTIONAL_ASSESSMENT: 0-10
PAIN_FUNCTIONAL_ASSESSMENT: 0-10

## 2022-07-04 ASSESSMENT — ENCOUNTER SYMPTOMS
VOMITING: 0
GASTROINTESTINAL NEGATIVE: 1
SHORTNESS OF BREATH: 0
EYES NEGATIVE: 1
RESPIRATORY NEGATIVE: 1

## 2022-07-04 ASSESSMENT — PAIN SCALES - GENERAL
PAINLEVEL_OUTOF10: 5
PAINLEVEL_OUTOF10: 0

## 2022-07-04 ASSESSMENT — PAIN DESCRIPTION - ORIENTATION: ORIENTATION: RIGHT;LEFT

## 2022-07-04 ASSESSMENT — PAIN DESCRIPTION - LOCATION: LOCATION: LEG

## 2022-07-04 NOTE — ED NOTES
I have reviewed discharge instructions with the patient and caregiver. The patient and caregiver verbalized understanding. Patient left ED via Discharge Method: wheelchair to Home with son. Opportunity for questions and clarification provided. Patient given 1 scripts. To continue your aftercare when you leave the hospital, you may receive an automated call from our care team to check in on how you are doing. This is a free service and part of our promise to provide the best care and service to meet your aftercare needs.  If you have questions, or wish to unsubscribe from this service please call [phone]. Thank you for Choosing our [address] [place] [address] Emergency Department.    \     Margo Ortega RN  07/04/22 7537

## 2022-07-04 NOTE — ED PROVIDER NOTES
[employer] Emergency Department Provider Note                     PCP:                Audie Cho MD               Age: 80 y.o. Sex: female           ICD-10-CM    1. Hematuria, unspecified type  R31.9    2. Urinary tract infection with hematuria, site unspecified  N39.0     R31.9        DISPOSITION Decision To Discharge 07/04/2022 05:17:02 AM       New Prescriptions    CEFPODOXIME (VANTIN) 100 MG TABLET    Take 1 tablet by mouth 2 times daily for 10 days       MDM  Number of Diagnoses or Management Options  Hematuria, unspecified type  Urinary tract infection with hematuria, site unspecified  Diagnosis management comments: Patient denies pain at this time laboratory data is reassuring physical exam is unremarkable no abdominal tenderness bowel sounds are present. Lung sounds are clear heart regular rate and rhythm. Patient's urine appears to be infected this evening.   Will start on third-generation cephalosporin which she has tolerated in the past.  Send urine for culture follow-up with urologist       Amount and/or Complexity of Data Reviewed  Clinical lab tests: ordered and reviewed  Tests in the radiology section of CPT®: ordered and reviewed  Tests in the medicine section of CPT®: ordered and reviewed  Decide to obtain previous medical records or to obtain history from someone other than the patient: yes  Review and summarize past medical records: yes  Independent visualization of images, tracings, or specimens: yes    Risk of Complications, Morbidity, and/or Mortality  Presenting problems: high  Diagnostic procedures: high  Management options: high    Patient Progress  Patient progress: stable      Orders Placed This Encounter   Procedures    Culture, Urine    CBC with Auto Differential    Comprehensive Metabolic Panel    Urinalysis w rflx microscopic    POCT Urine Dipstick        Deisy Perdomo MD  [address] [facility] [address] [phone]    Call in 2 days         Seamus Macias is a 80 y.o. female who presents to the Emergency Department with chief complaint of  No chief complaint on file. 59-year-old female complaining of blood in urine. Family noted blood on the patient's pad. He is incontinent of urine usually but they saw blood clots. They contacted the primary care physician's office told them to come to the ER. She been seen in the urology office a week ago had a urinalysis which reportedly was \"clean\". Patient denies pain initially however did state the last time she urinated it burned at the end. The history is provided by the patient, a caregiver and a relative. Dysuria   This is a new problem. The current episode started more than 2 days ago. The problem occurs every urination. The problem has not changed since onset. The quality of the pain is described as burning. The pain is mild. There has been no fever. Associated symptoms include hematuria. Pertinent negatives include no chills, no vomiting, no discharge and no flank pain. She has tried nothing for the symptoms. Review of Systems   Constitutional: Negative. Negative for activity change, appetite change, chills, fatigue and fever. HENT: Negative. Eyes: Negative. Respiratory: Negative. Negative for shortness of breath. Cardiovascular: Negative. Negative for chest pain. Gastrointestinal: Negative. Negative for vomiting. Endocrine: Negative. Genitourinary: Positive for dysuria and hematuria. Negative for flank pain. Musculoskeletal: Negative. Neurological: Negative. Hematological: Negative. Psychiatric/Behavioral: Negative. All other systems reviewed and are negative. All other systems reviewed and are negative.       Past Medical History:   Diagnosis Date    Arthritis     Atrial fibrillation ([facility] [address]     CAD (coronary artery disease)     CHF (congestive heart failure) (HCC)     chronic systolic dysfunction    Current use of long term anticoagulation     Eliquis and Aspirin    Depression     Fibromyalgia     H/O heart artery stent     last stent placed over a year ago. Pt has 2 stents    History of COVID-19 dx:2/2021    pt not hospitalized    History of gastric ulcer     Hypertension     Hypothyroidism     Knee pain, left     Neuropathy     PUD (peptic ulcer disease)         Past Surgical History:   Procedure Laterality Date    APPENDECTOMY      CARDIAC CATHETERIZATION      stent X2    CARPAL TUNNEL RELEASE      CHOLECYSTECTOMY      COLONOSCOPY      GI      EGD; food bolus 2021    TOTAL KNEE ARTHROPLASTY Right     UPPER GASTROINTESTINAL ENDOSCOPY          Family History   Problem Relation Age of Onset    Hypertension Other            Social Connections:     Frequency of Communication with Friends and Family: Not on file    Frequency of Social Gatherings with Friends and Family: Not on file    Attends [employer] Services: Not on file    Active Member of Clubs or Organizations: Not on file    Attends Club or Organization Meetings: Not on file    Marital Status: Not on file        Allergies   Allergen Reactions    Cephalexin Other (See Comments)    Penicillins Rash    Sulfa Antibiotics Rash        Vitals signs and nursing note reviewed. Patient Vitals for the past 4 hrs:   BP SpO2   07/04/22 0430 116/79 100 %   07/04/22 0410 (!) 147/67 94 %   07/04/22 0400 133/70 (!) 78 %   07/04/22 0340 (!) 142/64 96 %   07/04/22 0330 121/64 94 %   07/04/22 0310 (!) 113/58 94 %   07/04/22 0255 (!) 122/59 93 %   07/04/22 0240 (!) 115/57 93 %   07/04/22 0225 114/63 92 %   07/04/22 0210 131/65 92 %          Physical Exam  Vitals and nursing note reviewed. Constitutional:       Appearance: Normal appearance. She is underweight. She is not ill-appearing. HENT:      Head: Normocephalic and atraumatic.       Right Ear: External ear normal.      Left Ear: External ear normal.      Nose: Nose normal.      Mouth/Throat:      Mouth: Mucous membranes are moist.   Eyes:      Extraocular Movements: Extraocular movements intact. Conjunctiva/sclera: Conjunctivae normal.      Pupils: Pupils are equal, round, and reactive to light. Cardiovascular:      Rate and Rhythm: Normal rate and regular rhythm. Pulses: Normal pulses. Heart sounds: Normal heart sounds. Pulmonary:      Effort: Pulmonary effort is normal. No respiratory distress. Breath sounds: Normal breath sounds. Abdominal:      General: Bowel sounds are normal. There is no distension. Palpations: Abdomen is soft. Musculoskeletal:         General: No swelling or signs of injury. Normal range of motion. Cervical back: Normal range of motion. No rigidity. Skin:     General: Skin is warm and dry. Capillary Refill: Capillary refill takes less than 2 seconds. Neurological:      General: No focal deficit present. Mental Status: She is alert and oriented to person, place, and time. Mental status is at baseline. Psychiatric:         Mood and Affect: Mood normal.         Behavior: Behavior normal.         Thought Content:  Thought content normal.         Judgment: Judgment normal.          Procedures    Labs Reviewed   CBC WITH AUTO DIFFERENTIAL - Abnormal; Notable for the following components:       Result Value    RBC 3.70 (*)     Hemoglobin 11.0 (*)     Hematocrit 34.3 (*)     RDW 14.7 (*)     All other components within normal limits   COMPREHENSIVE METABOLIC PANEL - Abnormal; Notable for the following components:    Anion Gap 4 (*)     Glucose 141 (*)     BUN 35 (*)     All other components within normal limits   URINALYSIS - Abnormal; Notable for the following components:    Protein,  (*)     Ketones, Urine TRACE (*)     Blood, Urine LARGE (*)     Leukocyte Esterase, Urine SMALL (*)     BACTERIA, URINE 4+ (*)     All other components within normal limits   CULTURE, URINE        No orders to display              Edinburgh Coma Scale  Eye Opening: Spontaneous  Best Verbal Response: Confused  Best Motor Response: Obeys commands  [place] Coma Scale Score: 14                     CIWA Assessment  BP: 116/79  Heart Rate: 85                       Voice dictation software was used during the making of this note. This software is not perfect and grammatical and other typographical errors may be present. This note has not been completely proofread for errors.        Dara Collado MD  07/04/22 91

## 2022-07-04 NOTE — ED TRIAGE NOTES
Pt was seen at her urologist on Monday and everything was fine started to have blood in urine on Friday family and pt states it is just getting worse tonight

## 2022-07-05 LAB
BACTERIA SPEC CULT: NORMAL
SERVICE CMNT-IMP: NORMAL

## 2022-09-07 ENCOUNTER — HOSPITAL ENCOUNTER (EMERGENCY)
Dept: CT IMAGING | Age: 87
Discharge: HOME | End: 2022-09-10
Payer: MEDICARE

## 2022-09-07 ENCOUNTER — HOSPITAL ENCOUNTER (EMERGENCY)
Age: 87
Discharge: HOME | End: 2022-09-07
Attending: EMERGENCY MEDICINE | Admitting: EMERGENCY MEDICINE
Payer: MEDICARE

## 2022-09-07 VITALS
SYSTOLIC BLOOD PRESSURE: 135 MMHG | RESPIRATION RATE: 18 BRPM | TEMPERATURE: 98.7 F | HEART RATE: 80 BPM | OXYGEN SATURATION: 95 % | DIASTOLIC BLOOD PRESSURE: 66 MMHG | HEIGHT: 64 IN | BODY MASS INDEX: 18.1 KG/M2 | WEIGHT: 106 LBS

## 2022-09-07 DIAGNOSIS — N39.0 ACUTE UTI (URINARY TRACT INFECTION): Primary | ICD-10-CM

## 2022-09-07 LAB
ALBUMIN SERPL-MCNC: 3.7 G/DL (ref 3.2–4.6)
ALBUMIN/GLOB SERPL: 1.1 {RATIO} (ref 1.2–3.5)
ALP SERPL-CCNC: 54 U/L (ref 50–136)
ALT SERPL-CCNC: 19 U/L (ref 12–65)
AMORPH CRY URNS QL MICRO: ABNORMAL
ANION GAP SERPL CALC-SCNC: 6 MMOL/L (ref 4–13)
APPEARANCE UR: CLEAR
AST SERPL-CCNC: 18 U/L (ref 15–37)
BACTERIA URNS QL MICRO: ABNORMAL /HPF
BASOPHILS # BLD: 0 K/UL (ref 0–0.2)
BASOPHILS NFR BLD: 1 % (ref 0–2)
BILIRUB SERPL-MCNC: 0.4 MG/DL (ref 0.2–1.1)
BILIRUB UR QL: NEGATIVE
BUN SERPL-MCNC: 31 MG/DL (ref 8–23)
CALCIUM SERPL-MCNC: 9.2 MG/DL (ref 8.3–10.4)
CHLORIDE SERPL-SCNC: 104 MMOL/L (ref 101–110)
CO2 SERPL-SCNC: 30 MMOL/L (ref 21–32)
COLOR UR: ABNORMAL
CREAT SERPL-MCNC: 0.7 MG/DL (ref 0.6–1)
DIFFERENTIAL METHOD BLD: ABNORMAL
EOSINOPHIL # BLD: 0.2 K/UL (ref 0–0.8)
EOSINOPHIL NFR BLD: 2 % (ref 0.5–7.8)
EPI CELLS #/AREA URNS HPF: ABNORMAL /HPF
ERYTHROCYTE [DISTWIDTH] IN BLOOD BY AUTOMATED COUNT: 13.3 % (ref 11.9–14.6)
GLOBULIN SER CALC-MCNC: 3.3 G/DL (ref 2.3–3.5)
GLUCOSE SERPL-MCNC: 89 MG/DL (ref 65–100)
GLUCOSE UR STRIP.AUTO-MCNC: NEGATIVE MG/DL
HCT VFR BLD AUTO: 35.2 % (ref 35.8–46.3)
HGB BLD-MCNC: 11.2 G/DL (ref 11.7–15.4)
HGB UR QL STRIP: NEGATIVE
IMM GRANULOCYTES # BLD AUTO: 0 K/UL (ref 0–0.5)
IMM GRANULOCYTES NFR BLD AUTO: 0 % (ref 0–5)
KETONES UR QL STRIP.AUTO: NEGATIVE MG/DL
LACTATE SERPL-SCNC: 0.7 MMOL/L (ref 0.4–2)
LEUKOCYTE ESTERASE UR QL STRIP.AUTO: NEGATIVE
LIPASE SERPL-CCNC: 73 U/L (ref 73–393)
LYMPHOCYTES # BLD: 1 K/UL (ref 0.5–4.6)
LYMPHOCYTES NFR BLD: 12 % (ref 13–44)
MCH RBC QN AUTO: 30.8 PG (ref 26.1–32.9)
MCHC RBC AUTO-ENTMCNC: 31.8 G/DL (ref 31.4–35)
MCV RBC AUTO: 96.7 FL (ref 79.6–97.8)
MONOCYTES # BLD: 0.6 K/UL (ref 0.1–1.3)
MONOCYTES NFR BLD: 7 % (ref 4–12)
NEUTS SEG # BLD: 6.2 K/UL (ref 1.7–8.2)
NEUTS SEG NFR BLD: 78 % (ref 43–78)
NITRITE UR QL STRIP.AUTO: POSITIVE
NRBC # BLD: 0 K/UL (ref 0–0.2)
OTHER OBSERVATIONS: ABNORMAL
PH UR STRIP: 7.5 [PH] (ref 5–9)
PLATELET # BLD AUTO: 381 K/UL (ref 150–450)
PMV BLD AUTO: 9.7 FL (ref 9.4–12.3)
POTASSIUM SERPL-SCNC: 3.9 MMOL/L (ref 3.5–5.1)
PROT SERPL-MCNC: 7 G/DL (ref 6.3–8.2)
PROT UR STRIP-MCNC: NEGATIVE MG/DL
RBC # BLD AUTO: 3.64 M/UL (ref 4.05–5.2)
RBC #/AREA URNS HPF: ABNORMAL /HPF
SODIUM SERPL-SCNC: 140 MMOL/L (ref 136–145)
SP GR UR REFRACTOMETRY: 1.01 (ref 1–1.02)
UROBILINOGEN UR QL STRIP.AUTO: 0.2 EU/DL (ref 0.2–1)
WBC # BLD AUTO: 8 K/UL (ref 4.3–11.1)
WBC URNS QL MICRO: ABNORMAL /HPF

## 2022-09-07 PROCEDURE — 85025 COMPLETE CBC W/AUTO DIFF WBC: CPT

## 2022-09-07 PROCEDURE — 83690 ASSAY OF LIPASE: CPT

## 2022-09-07 PROCEDURE — 2580000003 HC RX 258: Performed by: EMERGENCY MEDICINE

## 2022-09-07 PROCEDURE — 99284 EMERGENCY DEPT VISIT MOD MDM: CPT

## 2022-09-07 PROCEDURE — 2500000003 HC RX 250 WO HCPCS: Performed by: EMERGENCY MEDICINE

## 2022-09-07 PROCEDURE — 96372 THER/PROPH/DIAG INJ SC/IM: CPT

## 2022-09-07 PROCEDURE — 74176 CT ABD & PELVIS W/O CONTRAST: CPT

## 2022-09-07 PROCEDURE — 83605 ASSAY OF LACTIC ACID: CPT

## 2022-09-07 PROCEDURE — 81001 URINALYSIS AUTO W/SCOPE: CPT

## 2022-09-07 PROCEDURE — 80053 COMPREHEN METABOLIC PANEL: CPT

## 2022-09-07 PROCEDURE — 6370000000 HC RX 637 (ALT 250 FOR IP): Performed by: EMERGENCY MEDICINE

## 2022-09-07 PROCEDURE — 6360000002 HC RX W HCPCS: Performed by: EMERGENCY MEDICINE

## 2022-09-07 RX ORDER — HYDROCODONE BITARTRATE AND ACETAMINOPHEN 10; 325 MG/1; MG/1
1 TABLET ORAL
Status: COMPLETED | OUTPATIENT
Start: 2022-09-07 | End: 2022-09-07

## 2022-09-07 RX ORDER — 0.9 % SODIUM CHLORIDE 0.9 %
1000 INTRAVENOUS SOLUTION INTRAVENOUS ONCE
Status: COMPLETED | OUTPATIENT
Start: 2022-09-07 | End: 2022-09-07

## 2022-09-07 RX ORDER — FLUCONAZOLE 100 MG/1
200 TABLET ORAL
Status: COMPLETED | OUTPATIENT
Start: 2022-09-07 | End: 2022-09-07

## 2022-09-07 RX ADMIN — FLUCONAZOLE 200 MG: 100 TABLET ORAL at 15:29

## 2022-09-07 RX ADMIN — LIDOCAINE HYDROCHLORIDE 1000 MG: 10 INJECTION, SOLUTION INFILTRATION; PERINEURAL at 16:26

## 2022-09-07 RX ADMIN — SODIUM CHLORIDE 1000 ML: 9 INJECTION, SOLUTION INTRAVENOUS at 12:32

## 2022-09-07 RX ADMIN — HYDROCODONE BITARTRATE AND ACETAMINOPHEN 1 TABLET: 10; 325 TABLET ORAL at 15:28

## 2022-09-07 ASSESSMENT — PAIN - FUNCTIONAL ASSESSMENT: PAIN_FUNCTIONAL_ASSESSMENT: 0-10

## 2022-09-07 ASSESSMENT — PAIN SCALES - GENERAL
PAINLEVEL_OUTOF10: 8
PAINLEVEL_OUTOF10: 2
PAINLEVEL_OUTOF10: 6

## 2022-09-07 NOTE — DISCHARGE INSTRUCTIONS
Your blood work is normal. Your CT shows some bladder stones, but no blockage or obstruction. Your vaginal exam is normal. I spoke with your doctor. We did some IM antibiotics here and they are going to continue your antibiotics at the facility. They will also continue the vaginal cream. You may need to see a specialist if you pain continues. Drink plenty of water. Seek medical attention for worsening pain, fever, or any other concerns.

## 2022-09-07 NOTE — ED TRIAGE NOTES
Pt arrived via EMS from [facility] with c/o UTI. Facility reports pt finished her second round of ABX. Pt reports persistent dysuria.  HR 85 SpO 97% /70

## 2022-09-07 NOTE — ED PROVIDER NOTES
Emergency Department Provider Note                   PCP:                NOT ON FILE, DO               Age: 80 y.o. Sex: female       ICD-10-CM    1. Acute UTI (urinary tract infection)  N39.0           DISPOSITION Decision To Discharge 09/07/2022 04:58:49 PM        MDM  Number of Diagnoses or Management Options  Acute UTI (urinary tract infection)  Diagnosis management comments: Patient presents from the nursing home with continued dysuria despite 2 rounds of antibiotics. Differential diagnosis includes comminuted UTI, renal failure, dehydration, sepsis, vaginal irritation, renal stone, bladder stone. Because the patient had this questionable right flank pain, I also sent her for a CT without contrast.  Radiology noted some bladder stones, but no obstruction. Blood work is normal.  There is leukocytosis on Asimia failure. Patient underwent a straight cath urinalysis. It continues to show positive nitrites. Her  exam was unremarkable. I spoke with the patient's doctor at the nursing home facility. She requested that we do a dose of IV Rocephin, a dose Diflucan, and she was then looking for another few days. Nursing home sent a urine culture, and we have 1 since here well. Patient will discharge back to the facility and continued monitoring there. Of note, the patient's first IV infiltrated. She had some swelling and mild tenderness to the left forearm. I went to evaluate the patient. I think this will heal well with elevation, warm compresses. Family was updated on what to look for as far as warning signs of complication.     Dr. Marixa Ortega [phone]         Orders Placed This Encounter   Procedures    CT ABDOMEN PELVIS RENAL STONE Additional Contrast? None    CMP    CBC with Auto Differential    Urinalysis    Lactate, Sepsis    Lipase        Medications   0.9 % sodium chloride bolus (0 mLs IntraVENous Stopped 9/7/22 1527)   fluconazole (DIFLUCAN) tablet 200 mg (200 mg Oral Given 9/7/22 1529)   HYDROcodone-acetaminophen (NORCO)  MG per tablet 1 tablet (1 tablet Oral Given 9/7/22 1528)       Discharge Medication List as of 9/7/2022  5:10 PM           Burton Vasquez is a 80 y.o. female who presents to the Emergency Department with chief complaint of    Chief Complaint   Patient presents with    Dysuria      Patient presents from a local nursing home with a chief complaint of continued dysuria. Patient has had lower abdominal pain and dysuria for about 3 weeks. She has been on 2 rounds of antibiotics without much relief. The second round of antibiotics included Levaquin. She states that it burns when she pees. She denies any diarrhea or constipation. She has some very vague lower abdominal pain. The nursing home said that she was complaining of right flank pain today as well. Patient denies nausea or vomiting. No recent fever. No chest pain or shortness of breath. She is on Lortab for pain control and last took a dose last night. There is a question from the nursing facility about whether the patient actually received her first dose of vaginal estrogen cream.  Patient has a history of dementia and is a poor historian. Review of Systems   All other systems reviewed and are negative. Past Medical History:   Diagnosis Date    Arthritis     Atrial fibrillation (HCC)     CAD (coronary artery disease)     CHF (congestive heart failure) (HCC)     chronic systolic dysfunction    Current use of long term anticoagulation     Eliquis and Aspirin    Depression     Fibromyalgia     H/O heart artery stent     last stent placed over a year ago.  Pt has 2 stents    History of COVID-19 dx:2/2021    pt not hospitalized    History of gastric ulcer     Hypertension     Hypothyroidism     Knee pain, left     Neuropathy     PUD (peptic ulcer disease)         Past Surgical History:   Procedure Laterality Date    APPENDECTOMY      CARDIAC CATHETERIZATION      stent X2    CARPAL TUNNEL RELEASE CHOLECYSTECTOMY      COLONOSCOPY      GI      EGD; food bolus 2021    TOTAL KNEE ARTHROPLASTY Right     UPPER GASTROINTESTINAL ENDOSCOPY          Family History   Problem Relation Age of Onset    Hypertension Other         Social History     Socioeconomic History    Marital status:    Tobacco Use    Smoking status: Never    Smokeless tobacco: Never   Substance and Sexual Activity    Alcohol use: No    Drug use: No         Cephalexin, Penicillins, and Sulfa antibiotics     Discharge Medication List as of 9/7/2022  5:10 PM        CONTINUE these medications which have NOT CHANGED    Details   amiodarone (CORDARONE) 200 MG tablet Take 200 mg by mouth dailyHistorical Med      apixaban (ELIQUIS) 2.5 MG TABS tablet Take 2.5 mg by mouth 2 times dailyHistorical Med      aspirin 81 MG EC tablet Take 81 mg by mouth dailyHistorical Med      Calcium Carbonate-Vitamin D (CALCIUM-VITAMIN D) 600-125 MG-UNIT TABS Take 500 mg by mouthHistorical Med      carvedilol (COREG) 6.25 MG tablet Take 6.25 mg by mouth 2 times daily (with meals)Historical Med      Cholecalciferol 50 MCG (2000 UT) TABS Take by mouthHistorical Med      cyanocobalamin 1000 MCG tablet Take 1,000 mcg by mouth dailyHistorical Med      diclofenac sodium (VOLTAREN) 1 % GEL Apply topically 4 times daily, Topical, 4 TIMES DAILY, Historical Med      DULoxetine (CYMBALTA) 60 MG extended release capsule Take 60 mg by mouthHistorical Med      fenofibrate micronized (LOFIBRA) 134 MG capsule Take by mouthHistorical Med      fentaNYL (DURAGESIC) 25 MCG/HR Place 1 patch onto the skin every 72 hours. Historical Med      ferrous sulfate (SLOW FE) 142 (45 Fe) MG extended release tablet Take by mouth every morning (before breakfast)Historical Med      fluticasone (FLONASE) 50 MCG/ACT nasal spray 2 sprays by Nasal route dailyHistorical Med      furosemide (LASIX) 40 MG tablet 1/2 tablet daily PRN swellingHistorical Med      gabapentin (NEURONTIN) 400 MG capsule 1 cap BID & 2 caps qHS Indications: 400mg bid and 800mg at hsHistorical Med      HYDROcodone-acetaminophen (NORCO) 7.5-325 MG per tablet Take 1 tablet by mouth every 8 hours as needed. Historical Med      levothyroxine (SYNTHROID) 50 MCG tablet Take 50 mcg by mouth every morning (before breakfast)Historical Med      lisinopril (PRINIVIL;ZESTRIL) 2.5 MG tablet Take 2.5 mg by mouth dailyHistorical Med      LORazepam (ATIVAN) 0.5 MG tablet Take by mouth every 8 hours as needed. Historical Med      magnesium oxide (MAG-OX) 400 (240 Mg) MG tablet Take 400 mg by mouth 2 times dailyHistorical Med      mirtazapine (REMERON) 15 MG tablet Take 15 mg by mouthHistorical Med      nitroGLYCERIN (NITROSTAT) 0.4 MG SL tablet Place 0.4 mg under the tongueHistorical Med      omeprazole (PRILOSEC) 20 MG delayed release capsule Take 20 mg by mouth 2 times dailyHistorical Med      polyethylene glycol (GLYCOLAX) 17 GM/SCOOP powder Take 17 g by mouth as neededHistorical Med      pravastatin (PRAVACHOL) 80 MG tablet Take 80 mg by mouthHistorical Med      sucralfate (CARAFATE) 1 GM tablet Take 1 g by mouth 4 times daily (before meals and nightly)Historical Med      SUMAtriptan (IMITREX) 100 MG tablet Take 100 mg by mouth once as neededHistorical Med              Vitals signs and nursing note reviewed. No data found. Physical Exam  Vitals and nursing note reviewed. Constitutional:       General: She is not in acute distress. Appearance: Normal appearance. She is not toxic-appearing. HENT:      Head: Normocephalic and atraumatic. Mouth/Throat:      Mouth: Mucous membranes are moist.   Eyes:      Extraocular Movements: Extraocular movements intact. Conjunctiva/sclera: Conjunctivae normal.      Pupils: Pupils are equal, round, and reactive to light. Cardiovascular:      Rate and Rhythm: Normal rate and regular rhythm. Pulmonary:      Effort: Pulmonary effort is normal.      Breath sounds: Normal breath sounds.    Abdominal: General: Bowel sounds are normal. There is no distension. Palpations: Abdomen is soft. Tenderness: There is no abdominal tenderness. There is no guarding. Genitourinary:     General: Normal vulva. Rectum: Normal.      Comments: No skin lesions or vesicles noted along the vaginal walls; no lacerations or sores; no discharge; no significant erythema. I did not visualize the patient's cervix  Musculoskeletal:         General: No deformity. Normal range of motion. Cervical back: Normal range of motion and neck supple. Skin:     General: Skin is warm and dry. Capillary Refill: Capillary refill takes less than 2 seconds. Coloration: Skin is not jaundiced. Neurological:      General: No focal deficit present. Mental Status: She is alert and oriented to person, place, and time. Psychiatric:         Mood and Affect: Mood normal.         Behavior: Behavior normal.         Thought Content: Thought content normal.        Procedures      [unfilled]     CT ABDOMEN PELVIS RENAL STONE Additional Contrast? None   Final Result   1. Mild bilateral hydronephrosis. 2.  Dependent bladder stones appear to be present but there is no definite   obstructing ureteral stone. Voice dictation software was used during the making of this note. This software is not perfect and grammatical and other typographical errors may be present. This note has not been completely proofread for errors.      Tabby Chung MD  09/09/22 2690

## 2022-09-16 RX ORDER — ALUMINA, MAGNESIA, AND SIMETHICONE 2400; 2400; 240 MG/30ML; MG/30ML; MG/30ML
30 SUSPENSION ORAL 3 TIMES DAILY PRN
COMMUNITY

## 2022-09-16 RX ORDER — AZELASTINE 1 MG/ML
1 SPRAY, METERED NASAL 2 TIMES DAILY
COMMUNITY

## 2022-09-16 RX ORDER — ACETAMINOPHEN 325 MG/1
650 TABLET ORAL EVERY 6 HOURS PRN
COMMUNITY

## 2022-09-16 RX ORDER — ESTRADIOL 0.1 MG/G
1 CREAM VAGINAL WEEKLY
COMMUNITY

## 2022-09-16 RX ORDER — CELECOXIB 200 MG/1
200 CAPSULE ORAL DAILY
COMMUNITY

## 2022-09-16 RX ORDER — IBUPROFEN 200 MG
1 CAPSULE ORAL DAILY
COMMUNITY

## 2022-09-16 RX ORDER — CHOLECALCIFEROL (VITAMIN D3) 125 MCG
5 CAPSULE ORAL NIGHTLY
COMMUNITY

## 2022-09-16 RX ORDER — BISACODYL 10 MG
10 SUPPOSITORY, RECTAL RECTAL DAILY PRN
COMMUNITY

## 2022-09-16 RX ORDER — POTASSIUM CHLORIDE 750 MG/1
10 CAPSULE, EXTENDED RELEASE ORAL DAILY
COMMUNITY

## 2022-09-16 NOTE — PERIOP NOTE
Patient name and  verified per facility documents. [place] [facility] [address] [place] [address]. Pre assessment completed from the documents received from facility  Patient's history scanned in chart by charge nurse. Order for consent NOT found in EHR at time of PAT visit. Unable to verify case posting against order. Type 1a surgery, phone assessment complete. Orders not received. Labs per surgeon: unknown; no orders received    Labs per anesthesia protocol: POC K+ DOS      All prior to admission medications per facility records documented in [address] [facility]. Patient instructed on the following:  Arrive at the [facility] ; time of arrival to be called the day before by 1700  NPO after midnight including gum, mints, and ice chips  Responsible adult must drive patient to the hospital, stay during surgery, and patient will need supervision 24 hours after anesthesia  Use antibacterial soap and hibiclens  in shower the night before surgery and on the morning of surgery  All piercings must be removed prior to arrival.    Leave all valuables (money and jewelry) at home but bring insurance card and ID on       DOS. Do not wear make-up, nail polish, lotions, cologne, perfumes, powders, or oil on skin. Medication instructions, patient guide to surgery, and bathing instruction sheet emailed through [employer] to [email or web address]

## 2022-09-16 NOTE — PROGRESS NOTES
Called and confirmed procedure with the staff at SNF for 9/19/22. All questions and concerns answered at this time.      [address] [place] [address] [facility]nas [phone] Hugo the unit nurse

## 2022-09-16 NOTE — PERIOP NOTE
PLEASE CONTINUE TAKING ALL PRESCRIPTION MEDICATIONS UP TO THE DAY OF SURGERY UNLESS OTHERWISE DIRECTED BELOW. DISCONTINUE all vitamins and supplements now. DISCONTINUE Non-Steriodal Anti-Inflammatory (NSAIDS) such as Advil and Aleve 5 days prior to surgery. Home Medications to take  the day of surgery    Aspirin 81 mg    Amiodarone      Gabapentin    Cymbalta    Carvedilol      Astelin nasal      Levothyroxine    Prilosec     If needed: Norco      Ativan     Natural Tears      Carafate        Home Medications   to Hold   Follow surgeon's instructions for Eliquis  Hold vitamins and supplements   Do NOT take on morning of procedure: Celebrex, Lofibra, Lasix,      Comments       On the day before surgery please take Acetaminophen 1000mg in the morning and then again before bed. You may substitute for Tylenol 650 mg. Please do not bring home medications with you on the day of surgery unless otherwise directed by your nurse. If you are instructed to bring home medications, please give them to your nurse as they will be administered by the nursing staff. If you have any questions, please call  [address] [phone]. A copy of this note was provided to the nursing facility for reference.

## 2022-09-17 ENCOUNTER — APPOINTMENT (OUTPATIENT)
Dept: GENERAL RADIOLOGY | Age: 87
End: 2022-09-17
Payer: MEDICARE

## 2022-09-17 ENCOUNTER — HOSPITAL ENCOUNTER (EMERGENCY)
Age: 87
Discharge: HOME | End: 2022-09-17
Attending: EMERGENCY MEDICINE
Payer: MEDICARE

## 2022-09-17 VITALS
SYSTOLIC BLOOD PRESSURE: 132 MMHG | HEART RATE: 64 BPM | DIASTOLIC BLOOD PRESSURE: 56 MMHG | RESPIRATION RATE: 18 BRPM | BODY MASS INDEX: 20.81 KG/M2 | OXYGEN SATURATION: 96 % | TEMPERATURE: 98.2 F | WEIGHT: 106 LBS | HEIGHT: 60 IN

## 2022-09-17 DIAGNOSIS — S20.221A CONTUSION OF RIGHT SIDE OF BACK, INITIAL ENCOUNTER: Primary | ICD-10-CM

## 2022-09-17 LAB
BILIRUB UR QL: NEGATIVE
GLUCOSE UR QL STRIP.AUTO: NEGATIVE MG/DL
KETONES UR-MCNC: NEGATIVE MG/DL
LEUKOCYTE ESTERASE UR QL STRIP: NEGATIVE
NITRITE UR QL: NEGATIVE
PH UR: 8.5 [PH] (ref 5–9)
PROT UR QL: 30 MG/DL
RBC # UR STRIP: NEGATIVE /UL
SERVICE CMNT-IMP: ABNORMAL
SP GR UR: 1.01 (ref 1–1.02)
UROBILINOGEN UR QL: 0.2 EU/DL (ref 0.2–1)

## 2022-09-17 PROCEDURE — 81003 URINALYSIS AUTO W/O SCOPE: CPT

## 2022-09-17 PROCEDURE — 99283 EMERGENCY DEPT VISIT LOW MDM: CPT

## 2022-09-17 PROCEDURE — 72100 X-RAY EXAM L-S SPINE 2/3 VWS: CPT

## 2022-09-17 ASSESSMENT — ENCOUNTER SYMPTOMS
COLOR CHANGE: 1
NAUSEA: 0
BACK PAIN: 1
VOMITING: 0

## 2022-09-17 ASSESSMENT — PAIN - FUNCTIONAL ASSESSMENT: PAIN_FUNCTIONAL_ASSESSMENT: 0-10

## 2022-09-17 NOTE — ED TRIAGE NOTES
Patient presents with complaints of ecchymosis to lower back. EMS noted that it was found yesterday. No fall noted. Patient on blood thinners.

## 2022-09-17 NOTE — ED PROVIDER NOTES
Emergency Department Provider Note                   PCP:                Rick Jaffe MD               Age: 80 y.o. Sex: female     No diagnosis found. DISPOSITION          MDM  Number of Diagnoses or Management Options  Contusion of right side of back, initial encounter  Diagnosis management comments: Her bruise does not look acutely looks like it is been there for at least several days. She has no obvious bony injury. Given that she is on Eliquis I will get an x-ray of her lumbar spine to rule out occult bony abnormality. ED Course as of 09/17/22 1226   Sat Sep 17, 2022   1225 Urine and x-ray are negative. I will discharge her home. [AC]      ED Course User Index  [AC] Ken Membreno MD        Orders Placed This Encounter   Procedures    XR LUMBAR SPINE (2-3 VIEWS)    POCT Urine Dipstick    POCT Urinalysis no Micro        Medications - No data to display    New Prescriptions    No medications on file        Michael Watson is a 80 y.o. female who presents to the Emergency Department with chief complaint of    Chief Complaint   Patient presents with    Back Pain      25-year-old lady presents with concerns from the nursing home about a bruise that was just discovered on her right lower back last night. Patient says she does have some pain in that area and so she was sent to the emergency department today for further evaluation of the bruise. She says she does not remember any specific fall or injury. She is on both aspirin and Eliquis for history of atrial fibrillation. She does have a history of UTI and she did ask us to check her urine today to see if that is any better because she is still having some burning pain in her vaginal area when she uses the bathroom. She denies any fevers or chills and she has no weakness or numbness. She is somewhat ambulatory with a walker. No other associated symptoms.     Elements of this note were created using speech recognition software. As such, errors of speech recognition may be present. Review of Systems   Constitutional:  Negative for chills and fever. Cardiovascular:  Negative for chest pain and palpitations. Gastrointestinal:  Negative for nausea and vomiting. Musculoskeletal:  Positive for back pain. Negative for arthralgias. Skin:  Positive for color change. Negative for rash and wound. Neurological:  Negative for dizziness and headaches.      Past Medical History:   Diagnosis Date    Adjustment disorder     Anxiety     Arthritis     Atrial fibrillation (HCC)     CAD (coronary artery disease)     CHF (congestive heart failure) (HCC)     chronic systolic dysfunction    Chronic pain syndrome     Constipation, unspecified     Current use of long term anticoagulation     Eliquis and Aspirin    Debility     Depression     Disorder of facial nerve, unspecified     Dry eye syndrome of unspecified lacrimal gland     Dysuria     Fibromyalgia     Gastro-esophageal reflux disease with esophagitis, without bleeding     H/O heart artery stent     Pt has 2 stents    Hard of hearing     History of COVID-19 dx:2/2021    pt not hospitalized    History of depression     History of essential hypertension     History of gastric ulcer     History of hypothyroidism     History of skin cancer     Hyperlipidemia     Hypertension     Hypothyroidism     Insomnia, unspecified     Knee pain, left     Migraine, unspecified, not intractable, without status migrainosus     Multinodular goiter     Neuropathy     Opioid use, unspecified, uncomplicated     Osteoporosis     Other chest pain     Other chronic pain     Other hemorrhoids     Other idiopathic peripheral autonomic neuropathy     Peptic ulcer, site unspecified, unspecified as acute or chronic, without hemorrhage or perforation     Peripheral neuropathy     Postmenopausal atrophic vaginitis     PUD (peptic ulcer disease)     Spinal stenosis     Unspecified systolic (congestive) heart failure (HCC)     Urinary incontinence     Urinary tract infection, site not specified     Vitamin B12 deficiency anemia, unspecified     Vitamin D deficiency     Vitamin deficiency, unspecified         Past Surgical History:   Procedure Laterality Date    APPENDECTOMY      CARDIAC CATHETERIZATION      stent X2    CARPAL TUNNEL RELEASE      CHOLECYSTECTOMY      COLONOSCOPY      GI      EGD; food bolus 2021    TOTAL KNEE ARTHROPLASTY Right     UPPER GASTROINTESTINAL ENDOSCOPY          Family History   Problem Relation Age of Onset    Hypertension Other         Social History     Socioeconomic History    Marital status:       Spouse name: None    Number of children: None    Years of education: None    Highest education level: None   Tobacco Use    Smoking status: Never    Smokeless tobacco: Never   Substance and Sexual Activity    Alcohol use: No    Drug use: No         Azithromycin, Cephalexin, Hydrochlorothiazide, Nsaids, Simvastatin, Triamterene, Penicillins, and Sulfa antibiotics     Previous Medications    ACETAMINOPHEN (TYLENOL) 325 MG TABLET    Take 650 mg by mouth every 6 hours as needed for Pain    ALUMINUM & MAGNESIUM HYDROXIDE-SIMETHICONE (MYLANTA) 400-400-40 MG/5ML SUSP    Take 30 mLs by mouth 3 times daily as needed    AMIODARONE (CORDARONE) 200 MG TABLET    Take 200 mg by mouth daily    APIXABAN (ELIQUIS) 2.5 MG TABS TABLET    Take 2.5 mg by mouth 2 times daily    ASPIRIN 81 MG EC TABLET    Take 81 mg by mouth daily    AZELASTINE (ASTELIN) 0.1 % NASAL SPRAY    1 spray by Nasal route 2 times daily Use in each nostril as directed    BISACODYL (DULCOLAX) 10 MG SUPPOSITORY    Place 10 mg rectally daily as needed for Constipation    CALCIUM CITRATE (CALCITRATE) 950 (200 CA) MG TABLET    Take 1 tablet by mouth daily    CARVEDILOL (COREG) 6.25 MG TABLET    Take 6.25 mg by mouth 2 times daily (with meals)    CELECOXIB (CELEBREX) 200 MG CAPSULE    Take 200 mg by mouth daily    CHOLECALCIFEROL 50 MCG (2000 (CARAFATE) 1 GM TABLET    Take 1 g by mouth 4 times daily (before meals and nightly)    SUMATRIPTAN (IMITREX) 100 MG TABLET    Take 100 mg by mouth once as needed        Vitals signs and nursing note reviewed. Patient Vitals for the past 4 hrs:   Temp Pulse Resp BP SpO2   09/17/22 1133 98.2 °F (36.8 °C) 74 16 135/71 96 %          Physical Exam  Vitals and nursing note reviewed. Constitutional:       Appearance: Normal appearance. Musculoskeletal:         General: No tenderness or signs of injury. Right lower leg: Edema present. Left lower leg: Edema present. Comments: 1+ edema in both lower extremities   Skin:     Comments: She does have a yellowish-brown bruise on her right low back just above her waistline   Neurological:      Mental Status: She is alert. Procedures    Results for orders placed or performed during the hospital encounter of 09/17/22   POCT Urinalysis no Micro   Result Value Ref Range    Specific Gravity, Urine, POC 1.015 1.001 - 1.023      pH, Urine, POC 8.5 5.0 - 9.0      Protein, Urine, POC 30 (A) NEG mg/dL    Glucose, UA POC Negative NEG mg/dL    Ketones, Urine, POC Negative NEG mg/dL    Bilirubin, Urine, POC Negative NEG      Blood, UA POC Negative NEG      URINE UROBILINOGEN POC 0.2 0.2 - 1.0 EU/dL    Nitrate, Urine, POC Negative NEG      Leukocyte Est, UA POC Negative NEG      Performed by: Eleuterio Garcia         XR LUMBAR SPINE (2-3 VIEWS)    (Results Pending)                       Voice dictation software was used during the making of this note. This software is not perfect and grammatical and other typographical errors may be present. This note has not been completely proofread for errors.         Nai Bustillos MD  09/17/22 7502

## 2022-09-17 NOTE — DISCHARGE INSTRUCTIONS
Please return to the emergency department with any blood in your urine or bowels, vomiting, difficulty breathing, worsening symptoms, or additional concerns. Follow-up with your regular doctors as needed.

## 2022-09-17 NOTE — ED NOTES
I have reviewed discharge instructions with the patient. The patient verbalized understanding. Patient left ED via Discharge Method: ambulatory to Home with family    Opportunity for questions and clarification provided. Patient given 0 scripts. To continue your aftercare when you leave the hospital, you may receive an automated call from our care team to check in on how you are doing. This is a free service and part of our promise to provide the best care and service to meet your aftercare needs.  If you have questions, or wish to unsubscribe from this service please call [phone]. Thank you for Choosing our [facility] Emergency Department.         Marita Escalante RN  09/17/22 4914

## 2022-09-18 ENCOUNTER — ANESTHESIA EVENT (OUTPATIENT)
Dept: ENDOSCOPY | Age: 87
End: 2022-09-18
Payer: MEDICARE

## 2022-09-18 RX ORDER — SODIUM CHLORIDE 0.9 % (FLUSH) 0.9 %
5-40 SYRINGE (ML) INJECTION EVERY 12 HOURS SCHEDULED
Status: CANCELLED | OUTPATIENT
Start: 2022-09-18

## 2022-09-18 RX ORDER — LIDOCAINE HYDROCHLORIDE 10 MG/ML
1 INJECTION, SOLUTION INFILTRATION; PERINEURAL
Status: CANCELLED | OUTPATIENT
Start: 2022-09-18 | End: 2022-09-18

## 2022-09-18 RX ORDER — SODIUM CHLORIDE, SODIUM LACTATE, POTASSIUM CHLORIDE, CALCIUM CHLORIDE 600; 310; 30; 20 MG/100ML; MG/100ML; MG/100ML; MG/100ML
INJECTION, SOLUTION INTRAVENOUS CONTINUOUS
Status: CANCELLED | OUTPATIENT
Start: 2022-09-18

## 2022-09-18 RX ORDER — SODIUM CHLORIDE 9 MG/ML
INJECTION, SOLUTION INTRAVENOUS PRN
Status: CANCELLED | OUTPATIENT
Start: 2022-09-18

## 2022-09-18 RX ORDER — SODIUM CHLORIDE 0.9 % (FLUSH) 0.9 %
5-40 SYRINGE (ML) INJECTION PRN
Status: CANCELLED | OUTPATIENT
Start: 2022-09-18

## 2022-09-19 ENCOUNTER — ANESTHESIA (OUTPATIENT)
Dept: ENDOSCOPY | Age: 87
End: 2022-09-19
Payer: MEDICARE

## 2022-09-20 ENCOUNTER — OFFICE VISIT (OUTPATIENT)
Dept: CARDIOLOGY CLINIC | Age: 87
End: 2022-09-20
Payer: MEDICARE

## 2022-09-20 VITALS
HEIGHT: 60 IN | WEIGHT: 106 LBS | SYSTOLIC BLOOD PRESSURE: 146 MMHG | HEART RATE: 76 BPM | BODY MASS INDEX: 20.81 KG/M2 | DIASTOLIC BLOOD PRESSURE: 52 MMHG

## 2022-09-20 DIAGNOSIS — E78.2 MIXED HYPERLIPIDEMIA: ICD-10-CM

## 2022-09-20 DIAGNOSIS — I50.22 CHRONIC SYSTOLIC CONGESTIVE HEART FAILURE (HCC): Primary | ICD-10-CM

## 2022-09-20 DIAGNOSIS — I48.91 ATRIAL FIBRILLATION WITH RVR (HCC): ICD-10-CM

## 2022-09-20 PROCEDURE — 1090F PRES/ABSN URINE INCON ASSESS: CPT | Performed by: INTERNAL MEDICINE

## 2022-09-20 PROCEDURE — G8420 CALC BMI NORM PARAMETERS: HCPCS | Performed by: INTERNAL MEDICINE

## 2022-09-20 PROCEDURE — 1036F TOBACCO NON-USER: CPT | Performed by: INTERNAL MEDICINE

## 2022-09-20 PROCEDURE — G8427 DOCREV CUR MEDS BY ELIG CLIN: HCPCS | Performed by: INTERNAL MEDICINE

## 2022-09-20 PROCEDURE — 1123F ACP DISCUSS/DSCN MKR DOCD: CPT | Performed by: INTERNAL MEDICINE

## 2022-09-20 PROCEDURE — 99214 OFFICE O/P EST MOD 30 MIN: CPT | Performed by: INTERNAL MEDICINE

## 2022-09-20 ASSESSMENT — ENCOUNTER SYMPTOMS
BACK PAIN: 0
EYE PAIN: 0
GASTROINTESTINAL NEGATIVE: 1
CHEST TIGHTNESS: 0
SHORTNESS OF BREATH: 0
PHOTOPHOBIA: 0
RESPIRATORY NEGATIVE: 1
EYES NEGATIVE: 1
ALLERGIC/IMMUNOLOGIC NEGATIVE: 1
ABDOMINAL PAIN: 0

## 2022-09-20 NOTE — PROGRESS NOTES
[address], [address]  PHONE: [phone]      22    NAME:  Sosa Lama  : 1928  MRN: [id number]         SUBJECTIVE:   Sosa Lama is a 80 y.o. female seen for follow up of:      Chief Complaint   Patient presents with    Atrial Fibrillation    Hypertension        Cardiac Hx (Reviewed and summarized by me): [facility] patient   1) systolic heart failure               Echo 3/29/18 - LVEF 20-25%               10/21/20 - LVEF 83-10%, with diastolic dysfunction   2) Holter 48 hr - 7% PVCs 18   3) AFib with RVR                Ablation Dr Son Walker 2018    Saw Dr Shelli Greenwood 22 - notes reviewed  ER visit 22 - Hematuria  ER visit 22 - UTI  ER visit 22 - bruising    Dr Sonido Berger [phone] this is the doctors caring for her at her facility. HPI:  Unfortunately under the recent past Ms. Keanu Belle has had multiple ER visits several for hematuria and urinary tract infections. Most recently was seen for some excessive bruising that may have been related to showering. She takes Celebrex aspirin and Eliquis. Her Eliquis is for A. fib. Past Medical History, Past Surgical History, Family history, Social History, and Medications were all reviewed with the patient today and updated as necessary.        Current Outpatient Medications:     azelastine (ASTELIN) 0.1 % nasal spray, 1 spray by Nasal route 2 times daily Use in each nostril as directed, Disp: , Rfl:     bisacodyl (DULCOLAX) 10 MG suppository, Place 10 mg rectally daily as needed for Constipation, Disp: , Rfl:     calcium citrate (CALCITRATE) 950 (200 Ca) MG tablet, Take 1 tablet by mouth daily, Disp: , Rfl:     celecoxib (CELEBREX) 200 MG capsule, Take 200 mg by mouth daily, Disp: , Rfl:     estradiol (ESTRACE) 0.1 MG/GM vaginal cream, Place 1 g vaginally once a week Wednesday, Disp: , Rfl:     melatonin 5 MG TABS tablet, Take 5 mg by mouth nightly, Disp: , Rfl:     aluminum & magnesium hydroxide-simethicone (MYLANTA) 400-400-40 MG/5ML SUSP, Take 30 mLs by mouth 3 times daily as needed, Disp: , Rfl:     NALOXONE HCL NA, 4 mg by Nasal route as needed (one spray, nasal, As needed, one spray into nostril prn for overdose up to one dose), Disp: , Rfl:     dextran 70-hypromellose (TEARS NATURALE) 0.1-0.3 % SOLN opthalmic solution, Place 1 drop into both eyes 3 times daily as needed, Disp: , Rfl:     potassium chloride (MICRO-K) 10 MEQ extended release capsule, Take 10 mEq by mouth daily, Disp: , Rfl:     amiodarone (CORDARONE) 200 MG tablet, Take 200 mg by mouth daily, Disp: , Rfl:     apixaban (ELIQUIS) 2.5 MG TABS tablet, Take 2.5 mg by mouth 2 times daily, Disp: , Rfl:     carvedilol (COREG) 6.25 MG tablet, Take 6.25 mg by mouth 2 times daily (with meals), Disp: , Rfl:     Cholecalciferol 50 MCG (2000 UT) TABS, Take 5,000 Units by mouth daily, Disp: , Rfl:     cyanocobalamin 1000 MCG tablet, Take 1,000 mcg by mouth daily, Disp: , Rfl:     DULoxetine (CYMBALTA) 60 MG extended release capsule, Take 60 mg by mouth, Disp: , Rfl:     fenofibrate micronized (LOFIBRA) 134 MG capsule, Take by mouth, Disp: , Rfl:     ferrous sulfate (SLOW FE) 142 (45 Fe) MG extended release tablet, Take by mouth every morning (before breakfast), Disp: , Rfl:     furosemide (LASIX) 40 MG tablet, 1/2 tablet daily PRN swelling, Disp: , Rfl:     gabapentin (NEURONTIN) 400 MG capsule, 1 cap BID & 2 caps qHS Indications: 400mg bid and 800mg at hs, Disp: , Rfl:     HYDROcodone-acetaminophen (NORCO) 7.5-325 MG per tablet, Take 1 tablet by mouth every 8 hours as needed. , Disp: , Rfl:     levothyroxine (SYNTHROID) 50 MCG tablet, Take 50 mcg by mouth every morning (before breakfast), Disp: , Rfl:     LORazepam (ATIVAN) 0.5 MG tablet, Take by mouth 2 times daily as needed. , Disp: , Rfl:     magnesium oxide (MAG-OX) 400 (240 Mg) MG tablet, Take 400 mg by mouth 2 times daily, Disp: , Rfl: mirtazapine (REMERON) 15 MG tablet, Take 15 mg by mouth nightly, Disp: , Rfl:     nitroGLYCERIN (NITROSTAT) 0.4 MG SL tablet, Place 0.4 mg under the tongue, Disp: , Rfl:     omeprazole (PRILOSEC) 20 MG delayed release capsule, Take 20 mg by mouth 2 times daily, Disp: , Rfl:     polyethylene glycol (GLYCOLAX) 17 GM/SCOOP powder, Take 17 g by mouth as needed, Disp: , Rfl:     pravastatin (PRAVACHOL) 80 MG tablet, Take 80 mg by mouth at bedtime, Disp: , Rfl:     sucralfate (CARAFATE) 1 GM tablet, Take 1 g by mouth 4 times daily (before meals and nightly), Disp: , Rfl:     SUMAtriptan (IMITREX) 100 MG tablet, Take 100 mg by mouth once as needed, Disp: , Rfl:     PE-Shark Liver Oil-Cocoa Buttr (HEMORRHOID RE), Place rectally 4 times daily as needed (Patient not taking: Reported on 9/20/2022), Disp: , Rfl:     acetaminophen (TYLENOL) 325 MG tablet, Take 650 mg by mouth every 6 hours as needed for Pain (Patient not taking: Reported on 9/20/2022), Disp: , Rfl:   Allergies   Allergen Reactions    Azithromycin     Cephalexin Other (See Comments)    Hydrochlorothiazide     Nsaids      Per facility documents. Takes 81 mg aspirin daily    Simvastatin     Triamterene     Penicillins Rash    Sulfa Antibiotics Rash     Past Medical History:   Diagnosis Date    Adjustment disorder     Anxiety     Arthritis     Atrial fibrillation (HCC)     CAD (coronary artery disease)     CHF (congestive heart failure) ([facility])     chronic systolic dysfunction    Chronic pain syndrome     Constipation, unspecified     Current use of long term anticoagulation     Eliquis and Aspirin    Debility     Depression     Disorder of facial nerve, unspecified     Dry eye syndrome of unspecified lacrimal gland     Dysuria     Fibromyalgia     Gastro-esophageal reflux disease with esophagitis, without bleeding     H/O heart artery stent     Pt has 2 stents    Hard of hearing     History of COVID-19 dx:2/2021    pt not hospitalized    History of depression History of essential hypertension     History of gastric ulcer     History of hypothyroidism     History of skin cancer     Hyperlipidemia     Hypertension     Hypothyroidism     Insomnia, unspecified     Knee pain, left     Migraine, unspecified, not intractable, without status migrainosus     Multinodular goiter     Neuropathy     Opioid use, unspecified, uncomplicated     Osteoporosis     Other chest pain     Other chronic pain     Other hemorrhoids     Other idiopathic peripheral autonomic neuropathy     Peptic ulcer, site unspecified, unspecified as acute or chronic, without hemorrhage or perforation     Peripheral neuropathy     Postmenopausal atrophic vaginitis     PUD (peptic ulcer disease)     Spinal stenosis     Unspecified systolic (congestive) heart failure (HCC)     Urinary incontinence     Urinary tract infection, site not specified     Vitamin B12 deficiency anemia, unspecified     Vitamin D deficiency     Vitamin deficiency, unspecified      Past Surgical History:   Procedure Laterality Date    APPENDECTOMY      CARDIAC CATHETERIZATION      stent X2    CARPAL TUNNEL RELEASE      CHOLECYSTECTOMY      COLONOSCOPY      GI      EGD; food bolus 2021    TOTAL KNEE ARTHROPLASTY Right     UPPER GASTROINTESTINAL ENDOSCOPY       Family History   Problem Relation Age of Onset    Hypertension Other      Social History     Tobacco Use    Smoking status: Never    Smokeless tobacco: Never   Substance Use Topics    Alcohol use: No       ROS:  Review of Systems   Constitutional: Negative. Negative for fever. HENT:  Negative for hearing loss, nosebleeds and tinnitus. Eyes: Negative. Negative for photophobia and pain. Respiratory: Negative. Negative for chest tightness and shortness of breath. Cardiovascular: Negative. Negative for chest pain, palpitations and leg swelling. Gastrointestinal: Negative. Negative for abdominal pain. Endocrine: Negative.   Negative for cold intolerance and heat intolerance. Genitourinary:  Positive for hematuria. Negative for dysuria. Musculoskeletal: Negative. Negative for back pain and joint swelling. Skin: Negative. Negative for rash. Allergic/Immunologic: Negative. Negative for immunocompromised state. Neurological: Negative. Negative for dizziness, syncope and light-headedness. Hematological: Negative. Does not bruise/bleed easily. Psychiatric/Behavioral: Negative. Negative for suicidal ideas. PHYSICAL EXAM:  Physical Exam  Constitutional:       General: She is not in acute distress. Appearance: She is not ill-appearing. HENT:      Head: Normocephalic and atraumatic. Nose: No congestion. Mouth/Throat:      Mouth: Mucous membranes are moist.   Eyes:      Extraocular Movements: Extraocular movements intact. Pupils: Pupils are equal, round, and reactive to light. Cardiovascular:      Rate and Rhythm: Normal rate and regular rhythm. Heart sounds: No murmur heard. No friction rub. No gallop. Pulmonary:      Effort: No respiratory distress. Breath sounds: No wheezing or rhonchi. Musculoskeletal:         General: No swelling. Cervical back: Normal range of motion. Right lower leg: No edema. Left lower leg: No edema. Skin:     General: Skin is warm and dry. Findings: No rash. Neurological:      General: No focal deficit present. Mental Status: She is oriented to person, place, and time.    Psychiatric:         Mood and Affect: Mood normal.         Behavior: Behavior normal.         Judgment: Judgment normal.        BP (!) 146/52   Pulse 76   Ht 5' (1.524 m)   Wt 106 lb (48.1 kg) Comment: pt in wheelchair  BMI 20.70 kg/m²      Wt Readings from Last 10 Encounters:   09/20/22 106 lb (48.1 kg)   09/17/22 106 lb (48.1 kg)   09/07/22 106 lb (48.1 kg)   07/04/22 110 lb (49.9 kg)   05/17/22 119 lb (54 kg)   02/03/22 118 lb (53.5 kg)   08/10/21 129 lb 6.4 oz (58.7 kg)   05/19/21 121 lb (54.9 kg)   05/05/21 130 lb (59 kg)           Medical problems and test results were reviewed with the patient today. Lab Results   Component Value Date/Time    BUN 31 09/07/2022 09:28 AM     No results found for: MADELEINE, CREAPOC, CREA  Lab Results   Component Value Date/Time    K 3.9 09/07/2022 09:28 AM       No results found for: CHOL, CHOLPOCT, CHOLX, CHLST, CHOLV, HDL, HDLPOC, HDLC, LDL, LDLC, VLDLC, VLDL, TGLX, TRIGL    ASSESSMENT and PLAN    ICD-10-CM    1. Chronic systolic congestive heart failure (HCC)  I50.22       2. Atrial fibrillation with RVR (HCC)  I48.91       3. Mixed hyperlipidemia  E78.2           IMPRESSION:  1) AFib -Eliquis 2.5 mg twice daily, I do not see a need for her to continue aspirin as well. We will stop that today. Would also consider stopping Celebrex as this may be causing some excessive bruising as well. I will see her back in 2 to 4 weeks and reevaluate the blood in her urine if she continues to bleed we may need to take a break from Eliquis for a time. 2) sCHF - lvef has recovered, continue Lasix 40 mg half a tablet as needed lisinopril 2.5 mg daily carvedilol 6.25 mg twice daily    3) LIpids - on Pravastatin 80 mg probably will no longer follow regular lipid panels      ALL ORDERS THIS ENCOUNTER  No orders of the defined types were placed in this encounter. Follow up in 1 months. Thank you for allowing me to participate in this patient's care. Please call or contact me if there are any questions or concerns regarding the above.       Luann Madrigal MD  09/20/22  12:20 PM

## 2022-09-26 ENCOUNTER — PREP FOR PROCEDURE (OUTPATIENT)
Dept: ADMINISTRATIVE | Age: 87
End: 2022-09-26

## 2022-10-03 RX ORDER — SODIUM CHLORIDE 9 MG/ML
INJECTION, SOLUTION INTRAVENOUS PRN
Status: CANCELLED | OUTPATIENT
Start: 2022-10-03

## 2022-10-03 RX ORDER — SODIUM CHLORIDE 0.9 % (FLUSH) 0.9 %
5-40 SYRINGE (ML) INJECTION PRN
Status: CANCELLED | OUTPATIENT
Start: 2022-10-03

## 2022-10-03 RX ORDER — SODIUM CHLORIDE 0.9 % (FLUSH) 0.9 %
5-40 SYRINGE (ML) INJECTION EVERY 12 HOURS SCHEDULED
Status: CANCELLED | OUTPATIENT
Start: 2022-10-03

## 2022-10-04 RX ORDER — SODIUM CHLORIDE 0.9 % (FLUSH) 0.9 %
5-40 SYRINGE (ML) INJECTION EVERY 12 HOURS SCHEDULED
Status: CANCELLED | OUTPATIENT
Start: 2022-10-04

## 2022-10-04 RX ORDER — SODIUM CHLORIDE 0.9 % (FLUSH) 0.9 %
5-40 SYRINGE (ML) INJECTION PRN
Status: CANCELLED | OUTPATIENT
Start: 2022-10-04

## 2022-10-04 RX ORDER — SODIUM CHLORIDE 9 MG/ML
INJECTION, SOLUTION INTRAVENOUS PRN
Status: CANCELLED | OUTPATIENT
Start: 2022-10-04

## 2022-10-04 RX ORDER — ONDANSETRON 2 MG/ML
4 INJECTION INTRAMUSCULAR; INTRAVENOUS
Status: CANCELLED | OUTPATIENT
Start: 2022-10-04 | End: 2022-10-05

## 2022-10-04 NOTE — PROGRESS NOTES
[facility]  spoke with Star who states that transport has been arranged to  patient and have her here by 0830  ariana Alvarez notified to earlier procedure time

## 2022-10-05 ENCOUNTER — HOSPITAL ENCOUNTER (OUTPATIENT)
Age: 87
Setting detail: OUTPATIENT SURGERY
Discharge: HOME | End: 2022-10-05
Attending: INTERNAL MEDICINE | Admitting: INTERNAL MEDICINE
Payer: MEDICARE

## 2022-10-05 VITALS
TEMPERATURE: 98.6 F | BODY MASS INDEX: 20.62 KG/M2 | RESPIRATION RATE: 14 BRPM | DIASTOLIC BLOOD PRESSURE: 55 MMHG | HEIGHT: 60 IN | WEIGHT: 105 LBS | SYSTOLIC BLOOD PRESSURE: 140 MMHG | OXYGEN SATURATION: 99 % | HEART RATE: 73 BPM

## 2022-10-05 PROCEDURE — 3609017700 HC EGD DILATION GASTRIC/DUODENAL STRICTURE: Performed by: INTERNAL MEDICINE

## 2022-10-05 PROCEDURE — 2580000003 HC RX 258: Performed by: ANESTHESIOLOGY

## 2022-10-05 PROCEDURE — 7100000010 HC PHASE II RECOVERY - FIRST 15 MIN: Performed by: INTERNAL MEDICINE

## 2022-10-05 PROCEDURE — 2709999900 HC NON-CHARGEABLE SUPPLY: Performed by: INTERNAL MEDICINE

## 2022-10-05 PROCEDURE — 3700000000 HC ANESTHESIA ATTENDED CARE: Performed by: INTERNAL MEDICINE

## 2022-10-05 PROCEDURE — 3700000001 HC ADD 15 MINUTES (ANESTHESIA): Performed by: INTERNAL MEDICINE

## 2022-10-05 PROCEDURE — 6360000002 HC RX W HCPCS

## 2022-10-05 PROCEDURE — 2500000003 HC RX 250 WO HCPCS

## 2022-10-05 PROCEDURE — 7100000011 HC PHASE II RECOVERY - ADDTL 15 MIN: Performed by: INTERNAL MEDICINE

## 2022-10-05 RX ORDER — LIDOCAINE HYDROCHLORIDE 10 MG/ML
1 INJECTION, SOLUTION INFILTRATION; PERINEURAL
Status: DISCONTINUED | OUTPATIENT
Start: 2022-10-05 | End: 2022-10-05 | Stop reason: HOSPADM

## 2022-10-05 RX ORDER — LIDOCAINE HYDROCHLORIDE 20 MG/ML
INJECTION, SOLUTION EPIDURAL; INFILTRATION; INTRACAUDAL; PERINEURAL PRN
Status: DISCONTINUED | OUTPATIENT
Start: 2022-10-05 | End: 2022-10-05 | Stop reason: SDUPTHER

## 2022-10-05 RX ORDER — SODIUM CHLORIDE, SODIUM LACTATE, POTASSIUM CHLORIDE, CALCIUM CHLORIDE 600; 310; 30; 20 MG/100ML; MG/100ML; MG/100ML; MG/100ML
INJECTION, SOLUTION INTRAVENOUS CONTINUOUS
Status: DISCONTINUED | OUTPATIENT
Start: 2022-10-05 | End: 2022-10-05 | Stop reason: HOSPADM

## 2022-10-05 RX ORDER — PROPOFOL 10 MG/ML
INJECTION, EMULSION INTRAVENOUS PRN
Status: DISCONTINUED | OUTPATIENT
Start: 2022-10-05 | End: 2022-10-05 | Stop reason: SDUPTHER

## 2022-10-05 RX ORDER — PROPOFOL 10 MG/ML
INJECTION, EMULSION INTRAVENOUS CONTINUOUS PRN
Status: DISCONTINUED | OUTPATIENT
Start: 2022-10-05 | End: 2022-10-05 | Stop reason: SDUPTHER

## 2022-10-05 RX ADMIN — LIDOCAINE HYDROCHLORIDE 50 MG: 20 INJECTION, SOLUTION EPIDURAL; INFILTRATION; INTRACAUDAL; PERINEURAL at 10:10

## 2022-10-05 RX ADMIN — PROPOFOL 140 MCG/KG/MIN: 10 INJECTION, EMULSION INTRAVENOUS at 10:10

## 2022-10-05 RX ADMIN — PROPOFOL 20 MG: 10 INJECTION, EMULSION INTRAVENOUS at 10:11

## 2022-10-05 RX ADMIN — SODIUM CHLORIDE, POTASSIUM CHLORIDE, SODIUM LACTATE AND CALCIUM CHLORIDE: 600; 310; 30; 20 INJECTION, SOLUTION INTRAVENOUS at 09:04

## 2022-10-05 RX ADMIN — PROPOFOL 30 MG: 10 INJECTION, EMULSION INTRAVENOUS at 10:10

## 2022-10-05 RX ADMIN — PROPOFOL 20 MG: 10 INJECTION, EMULSION INTRAVENOUS at 10:13

## 2022-10-05 ASSESSMENT — PAIN - FUNCTIONAL ASSESSMENT
PAIN_FUNCTIONAL_ASSESSMENT: NONE - DENIES PAIN

## 2022-10-05 NOTE — ANESTHESIA POSTPROCEDURE EVALUATION
Department of Anesthesiology  Postprocedure Note    Patient: Rosendo Traylor  MRN: [id number]  YOB: 1928  Date of evaluation: 10/5/2022      Procedure Summary     Date: 10/05/22 Room / Location: [facility] ENDO 05 / [facility] ENDOSCOPY    Anesthesia Start: 1002 Anesthesia Stop: 1024    Procedure: EGD DILATION ION (Upper GI Region) Diagnosis:       Gastroesophageal reflux disease without esophagitis      Pharyngoesophageal phase dysphagia      (Gastroesophageal reflux disease without esophagitis [K21.9])      (Pharyngoesophageal phase dysphagia [R13.14])    Surgeons: Leydi Montgomery MD Responsible Provider: Yamel Flores MD    Anesthesia Type: TIVA ASA Status: 3          Anesthesia Type: TIVA    Adam Phase I: Adam Score: 10    Adam Phase II: Adam Score: 6      Anesthesia Post Evaluation    Patient location during evaluation: PACU  Patient participation: complete - patient participated  Level of consciousness: awake and alert  Airway patency: patent  Nausea & Vomiting: no nausea and no vomiting  Complications: no  Cardiovascular status: hemodynamically stable  Respiratory status: acceptable  Hydration status: euvolemic  Comments: Blood pressure (!) 146/82, pulse 75, temperature 98.6 °F (37 °C), temperature source Skin, resp. rate 16, height 5' (1.524 m), weight 105 lb (47.6 kg), SpO2 99 %.       Pt stable for discharge from PACU  Multimodal analgesia pain management approach

## 2022-10-05 NOTE — ANESTHESIA PRE PROCEDURE
Department of Anesthesiology  Preprocedure Note       Name:  Moises Donato   Age:  80 y.o.  :  1928                                          MRN:  [id number]         Date:  10/5/2022      Surgeon: Jessica De Anda):  Trung Quigley MD    Procedure: Procedure(s):  EGD ESOPHAGOGASTRODUODENOSCOPY DILATATION    Medications prior to admission:   Prior to Admission medications    Medication Sig Start Date End Date Taking?  Authorizing Provider   azelastine (ASTELIN) 0.1 % nasal spray 1 spray by Nasal route 2 times daily Use in each nostril as directed   Yes Historical Provider, MD   bisacodyl (DULCOLAX) 10 MG suppository Place 10 mg rectally daily as needed for Constipation   Yes Historical Provider, MD   calcium citrate (CALCITRATE) 950 (200 Ca) MG tablet Take 1 tablet by mouth daily   Yes Historical Provider, MD   celecoxib (CELEBREX) 200 MG capsule Take 200 mg by mouth daily   Yes Historical Provider, MD   estradiol (ESTRACE) 0.1 MG/GM vaginal cream Place 1 g vaginally once a week Wednesday   Yes Historical Provider, MD   PE-Shark Liver Oil-Cocoa Buttr (HEMORRHOID RE) Place rectally 4 times daily as needed  Patient not taking: Reported on 2022   Yes Historical Provider, MD   melatonin 5 MG TABS tablet Take 5 mg by mouth nightly   Yes Historical Provider, MD   aluminum & magnesium hydroxide-simethicone (MYLANTA) 400-400-40 MG/5ML SUSP Take 30 mLs by mouth 3 times daily as needed   Yes Historical Provider, MD   NALOXONE HCL NA 4 mg by Nasal route as needed (one spray, nasal, As needed, one spray into nostril prn for overdose up to one dose)   Yes Historical Provider, MD   dextran 70-hypromellose (TEARS NATURALE) 0.1-0.3 % SOLN opthalmic solution Place 1 drop into both eyes 3 times daily as needed   Yes Historical Provider, MD   potassium chloride (MICRO-K) 10 MEQ extended release capsule Take 10 mEq by mouth daily   Yes Historical Provider, MD   acetaminophen (TYLENOL) 325 MG tablet Take 650 mg by mouth every 6 hours as needed for Pain  Patient not taking: Reported on 9/20/2022   Yes Historical Provider, MD   amiodarone (CORDARONE) 200 MG tablet Take 200 mg by mouth daily    Ar Automatic Reconciliation   apixaban (ELIQUIS) 2.5 MG TABS tablet Take 2.5 mg by mouth 2 times daily 4/5/18   Ar Automatic Reconciliation   carvedilol (COREG) 6.25 MG tablet Take 6.25 mg by mouth 2 times daily (with meals)    Ar Automatic Reconciliation   Cholecalciferol 50 MCG (2000 UT) TABS Take 5,000 Units by mouth daily    Ar Automatic Reconciliation   cyanocobalamin 1000 MCG tablet Take 1,000 mcg by mouth daily    Ar Automatic Reconciliation   DULoxetine (CYMBALTA) 60 MG extended release capsule Take 60 mg by mouth    Ar Automatic Reconciliation   fenofibrate micronized (LOFIBRA) 134 MG capsule Take by mouth    Ar Automatic Reconciliation   ferrous sulfate (SLOW FE) 142 (45 Fe) MG extended release tablet Take by mouth every morning (before breakfast)    Ar Automatic Reconciliation   furosemide (LASIX) 40 MG tablet 1/2 tablet daily PRN swelling    Ar Automatic Reconciliation   gabapentin (NEURONTIN) 400 MG capsule 1 cap BID & 2 caps qHS Indications: 400mg bid and 800mg at hs    Ar Automatic Reconciliation   HYDROcodone-acetaminophen (NORCO) 7.5-325 MG per tablet Take 1 tablet by mouth every 8 hours as needed. Ar Automatic Reconciliation   levothyroxine (SYNTHROID) 50 MCG tablet Take 50 mcg by mouth every morning (before breakfast)    Ar Automatic Reconciliation   LORazepam (ATIVAN) 0.5 MG tablet Take by mouth 2 times daily as needed.     Ar Automatic Reconciliation   magnesium oxide (MAG-OX) 400 (240 Mg) MG tablet Take 400 mg by mouth 2 times daily    Ar Automatic Reconciliation   mirtazapine (REMERON) 15 MG tablet Take 15 mg by mouth nightly    Ar Automatic Reconciliation   nitroGLYCERIN (NITROSTAT) 0.4 MG SL tablet Place 0.4 mg under the tongue 7/15/16   Ar Automatic Reconciliation   omeprazole (PRILOSEC) 20 MG delayed release capsule Take 20 mg by mouth 2 times daily    Ar Automatic Reconciliation   polyethylene glycol (GLYCOLAX) 17 GM/SCOOP powder Take 17 g by mouth as needed    Ar Automatic Reconciliation   pravastatin (PRAVACHOL) 80 MG tablet Take 80 mg by mouth at bedtime    Ar Automatic Reconciliation   sucralfate (CARAFATE) 1 GM tablet Take 1 g by mouth 4 times daily (before meals and nightly)    Ar Automatic Reconciliation   SUMAtriptan (IMITREX) 100 MG tablet Take 100 mg by mouth once as needed    Ar Automatic Reconciliation       Current medications:    No current facility-administered medications for this encounter. Allergies: Allergies   Allergen Reactions    Azithromycin     Cephalexin Other (See Comments)    Hydrochlorothiazide     Nsaids      Per facility documents. Takes 81 mg aspirin daily    Simvastatin     Triamterene     Penicillins Rash    Sulfa Antibiotics Rash       Problem List:    Patient Active Problem List   Diagnosis Code    Anticoagulant long-term use Z79.01    HLD (hyperlipidemia) E78.5    CAD (coronary artery disease) I25.10    CHF (congestive heart failure) ([facility]) I50.9    Atrial fibrillation with RVR ([facility]) I48.91    Primary hypertension I10       Past Medical History:        Diagnosis Date    Adjustment disorder     Anxiety     Arthritis     Atrial fibrillation ([facility])     CAD (coronary artery disease)     CHF (congestive heart failure) ([facility])     chronic systolic dysfunction    Chronic pain syndrome     Constipation, unspecified     Current use of long term anticoagulation     Eliquis and Aspirin    Debility     Depression     Disorder of facial nerve, unspecified     Dry eye syndrome of unspecified lacrimal gland     Dysuria     Fibromyalgia     Gastro-esophageal reflux disease with esophagitis, without bleeding     H/O heart artery stent     Pt has 2 stents    Hard of hearing     History of COVID-19 dx:2/2021    pt not hospitalized    History of depression     History of essential hypertension     History of gastric ulcer     History of hypothyroidism     History of skin cancer     Hyperlipidemia     Hypertension     Hypothyroidism     Insomnia, unspecified     Knee pain, left     Migraine, unspecified, not intractable, without status migrainosus     Multinodular goiter     Neuropathy     Opioid use, unspecified, uncomplicated     Osteoporosis     Other chest pain     Other chronic pain     Other hemorrhoids     Other idiopathic peripheral autonomic neuropathy     Peptic ulcer, site unspecified, unspecified as acute or chronic, without hemorrhage or perforation     Peripheral neuropathy     Postmenopausal atrophic vaginitis     PUD (peptic ulcer disease)     Spinal stenosis     Unspecified systolic (congestive) heart failure (HCC)     Urinary incontinence     Urinary tract infection, site not specified     Vitamin B12 deficiency anemia, unspecified     Vitamin D deficiency     Vitamin deficiency, unspecified        Past Surgical History:        Procedure Laterality Date    APPENDECTOMY      CARDIAC CATHETERIZATION      stent X2    CARPAL TUNNEL RELEASE      CHOLECYSTECTOMY      COLONOSCOPY      GI      EGD; food bolus 2021    TOTAL KNEE ARTHROPLASTY Right     UPPER GASTROINTESTINAL ENDOSCOPY         Social History:    Social History     Tobacco Use    Smoking status: Never    Smokeless tobacco: Never   Substance Use Topics    Alcohol use:  No                                Counseling given: Not Answered      Vital Signs (Current):   Vitals:    09/16/22 1117   Weight: 106 lb (48.1 kg)   Height: 5' (1.524 m)                                              BP Readings from Last 3 Encounters:   09/20/22 (!) 146/52   09/17/22 (!) 132/56   09/07/22 135/66       NPO Status:                                                                                 BMI:   Wt Readings from Last 3 Encounters:   09/16/22 106 lb (48.1 kg)   09/20/22 106 lb (48.1 kg) 09/17/22 106 lb (48.1 kg)     Body mass index is 20.7 kg/m². CBC:   Lab Results   Component Value Date/Time    WBC 8.0 09/07/2022 09:28 AM    RBC 3.64 09/07/2022 09:28 AM    HGB 11.2 09/07/2022 09:28 AM    HCT 35.2 09/07/2022 09:28 AM    MCV 96.7 09/07/2022 09:28 AM    RDW 13.3 09/07/2022 09:28 AM     09/07/2022 09:28 AM       CMP:   Lab Results   Component Value Date/Time     09/07/2022 09:28 AM    K 3.9 09/07/2022 09:28 AM     09/07/2022 09:28 AM    CO2 30 09/07/2022 09:28 AM    BUN 31 09/07/2022 09:28 AM    CREATININE 0.70 09/07/2022 09:28 AM    GFRAA >60 09/07/2022 09:28 AM    AGRATIO 1.0 04/01/2022 01:17 PM    LABGLOM >60 09/07/2022 09:28 AM    GLUCOSE 89 09/07/2022 09:28 AM    PROT 7.0 09/07/2022 09:28 AM    CALCIUM 9.2 09/07/2022 09:28 AM    BILITOT 0.4 09/07/2022 09:28 AM    ALKPHOS 54 09/07/2022 09:28 AM    ALKPHOS 58 04/01/2022 01:17 PM    AST 18 09/07/2022 09:28 AM    ALT 19 09/07/2022 09:28 AM       POC Tests: No results for input(s): POCGLU, POCNA, POCK, POCCL, POCBUN, POCHEMO, POCHCT in the last 72 hours.     Coags: No results found for: PROTIME, INR, APTT    HCG (If Applicable): No results found for: PREGTESTUR, PREGSERUM, HCG, HCGQUANT     ABGs: No results found for: PHART, PO2ART, FWD4RPN, JMM8PVU, BEART, T0ZHEDZJ     Type & Screen (If Applicable):  No results found for: LABABO, LABRH    Drug/Infectious Status (If Applicable):  No results found for: HIV, HEPCAB    COVID-19 Screening (If Applicable):   Lab Results   Component Value Date/Time    COVID19 Not detected 04/01/2022 02:41 PM    COVID19 Not Detected 05/03/2021 01:15 PM    COVID19 Performed 05/03/2021 01:15 PM           Anesthesia Evaluation  Patient summary reviewed and Nursing notes reviewed  Airway: Mallampati: II  TM distance: <3 FB   Neck ROM: limited  Mouth opening: > = 3 FB   Dental:    (+) upper dentures and lower dentures      Pulmonary:Negative Pulmonary ROS breath sounds clear to auscultation                             Cardiovascular:  Exercise tolerance: poor (<4 METS), Ambulates with walker  (+) CAD (stents > 5 yrs ago):, dysrhythmias (Eliquis held x 3d): atrial fibrillation, murmur: Grade 3, Aortic, hyperlipidemia        Rhythm: regular  Rate: normal                 ROS comment: Echo 2020 - normal EF, mild AS, LA dilation     Neuro/Psych:   Negative Neuro/Psych ROS              GI/Hepatic/Renal:   (+) GERD:,           Endo/Other:    (+) hypothyroidism::., .                 Abdominal:             Vascular: negative vascular ROS. Other Findings:           Anesthesia Plan      TIVA     ASA 3       Induction: intravenous. Anesthetic plan and risks discussed with patient.                         Evonne Juan MD   10/5/2022

## 2022-10-05 NOTE — DISCHARGE INSTRUCTIONS
Gastrointestinal Esophagogastroduodenoscopy (EGD)/ Endoscopic Ultrasound(EUS)- Upper Exam Discharge Instructions    1. Call Dr. Isaiah Koch at [phone]  for any problems or questions. 2. Contact the doctor's office for follow up appointment as directed. 3. Medication may cause drowsiness for several hours, therefore, do not drive or operate machinery for remainder of the day. 4. No alcohol today. 5. Do not make any important decisions such as signing legal paperwork. 6. Ordinarily, you may resume regular diet and activity after exam unless otherwise specified by your physician. 7. For mild soreness in your throat you may use Cepacol throat lozenges or warm  salt-water gargles as needed. Any additional instructions: Follow up with Dr Isaiah Koch  Dilation as needed  Soft diet today        Instructions given to Jackeline Navarro and other family members.

## 2022-10-05 NOTE — H&P
History and Physical for Outpatient Procedure             Date: 10/5/2022     History of Present Illness:  Patient has history of dysphagia and presents for EGD with possible esophageal dilation.       Past Medical History:   Diagnosis Date    Adjustment disorder     Anxiety     Arthritis     Atrial fibrillation (HCC)     CAD (coronary artery disease)     CHF (congestive heart failure) (HCC)     chronic systolic dysfunction    Chronic pain syndrome     Constipation, unspecified     Current use of long term anticoagulation     Eliquis and Aspirin    Debility     Depression     Disorder of facial nerve, unspecified     Dry eye syndrome of unspecified lacrimal gland     Dysuria     Fibromyalgia     Gastro-esophageal reflux disease with esophagitis, without bleeding     H/O heart artery stent     Pt has 2 stents    Hard of hearing     History of COVID-19 dx:2/2021    pt not hospitalized    History of depression     History of essential hypertension     History of gastric ulcer     History of hypothyroidism     History of skin cancer     Hyperlipidemia     Hypertension     Hypothyroidism     Insomnia, unspecified     Knee pain, left     Migraine, unspecified, not intractable, without status migrainosus     Multinodular goiter     Neuropathy     Opioid use, unspecified, uncomplicated     Osteoporosis     Other chest pain     Other chronic pain     Other hemorrhoids     Other idiopathic peripheral autonomic neuropathy     Peptic ulcer, site unspecified, unspecified as acute or chronic, without hemorrhage or perforation     Peripheral neuropathy     Postmenopausal atrophic vaginitis     PUD (peptic ulcer disease)     Spinal stenosis     Unspecified systolic (congestive) heart failure (HCC)     Urinary incontinence     Urinary tract infection, site not specified     Vitamin B12 deficiency anemia, unspecified     Vitamin D deficiency     Vitamin deficiency, unspecified      Past Surgical History:   Procedure Laterality Date APPENDECTOMY      CARDIAC CATHETERIZATION      stent X2    CARPAL TUNNEL RELEASE      CHOLECYSTECTOMY      COLONOSCOPY      GI      EGD; food bolus 2021    TOTAL KNEE ARTHROPLASTY Right     UPPER GASTROINTESTINAL ENDOSCOPY       In and  Family History   Problem Relation Age of Onset    Hypertension Other      Social History     Tobacco Use    Smoking status: Never    Smokeless tobacco: Never   Substance Use Topics    Alcohol use: No        Allergies   Allergen Reactions    Azithromycin     Cephalexin Other (See Comments)    Hydrochlorothiazide     Nsaids      Per facility documents. Takes 81 mg aspirin daily    Simvastatin     Triamterene     Penicillins Rash    Sulfa Antibiotics Rash     Current Facility-Administered Medications   Medication Dose Route Frequency    lidocaine 1 % injection 1 mL  1 mL IntraDERmal Once PRN    lactated ringers infusion   IntraVENous Continuous        Review of Systems:  A detailed 10 organ review of systems is obtained with pertinent positives as listed in the History of Present Illness. All others are negative. Objective:     Physical Exam:  BP (!) 140/61   Pulse 69   Temp 98.2 °F (36.8 °C) (Oral)   Resp 16   Ht 5' (1.524 m)   Wt 105 lb (47.6 kg)   SpO2 95%   BMI 20.51 kg/m²    General:  Alert and oriented. Heart: Regular rate and rhythm  Lungs:  Clear to auscultation bilaterally  Abdomen: Soft, nontender, nondistended    Impression/Plan:     Proceed with EGD with possible dilation as planned. I have discussed with the patient the technique, benefits, alternatives, and risks of these procedures, including medication reaction, immediate or delayed bleeding, or perforation of the gastrointestinal tract.      Signed By: Jeffry Benavides MD     October 5, 2022

## 2022-10-05 NOTE — OP NOTE
Esophagogastroduodenoscopy    DATE of PROCEDURE: 10/5/2022    MEDICATIONS ADMINISTERED: MAC    INSTRUMENT: GIF    PROCEDURE:  After obtaining informed consent, the patient was placed in the left lateral position and sedated. The endoscope was advanced under direct vision without difficulty. The esophagus, stomach (including retroflexed views) and duodenum were evaluated. The patient was taken to the recovery area in stable condition. FINDINGS:    OROPHARYNX: Cords and pyriform recesses normal.    ESOPHAGUS: The esophagus is normal. The proximal, mid and distal portions are normal. The Z-Line is unremarkable. Distal esophageal stricture. Dilated with 40 Fr Earl with no resistance, Mild resistance felt at 46 Fr. Post dilation inspection revealed improvement of the luminal narrowing and expected mucosal disruption without evidence of deep mucosal injury. STOMACH: The fundus on antegrade and retroflex views is normal. The body, antrum and pylorus are normal.  Hill grade II  hiatal hernia.     DUODENUM: The bulb and second portions are normal.     Estimated blood loss: 0-minimal     PLAN:  Follow up with Dr Ramos Rascon  Repeat dilation prkate Smith MD  [facility], [place]

## 2022-10-19 ENCOUNTER — OFFICE VISIT (OUTPATIENT)
Dept: CARDIOLOGY CLINIC | Age: 87
End: 2022-10-19
Payer: MEDICARE

## 2022-10-19 VITALS
SYSTOLIC BLOOD PRESSURE: 138 MMHG | BODY MASS INDEX: 20.42 KG/M2 | HEART RATE: 80 BPM | HEIGHT: 60 IN | WEIGHT: 104 LBS | DIASTOLIC BLOOD PRESSURE: 78 MMHG

## 2022-10-19 DIAGNOSIS — E78.2 MIXED HYPERLIPIDEMIA: ICD-10-CM

## 2022-10-19 DIAGNOSIS — I50.22 CHRONIC SYSTOLIC CONGESTIVE HEART FAILURE (HCC): Primary | ICD-10-CM

## 2022-10-19 DIAGNOSIS — I48.91 ATRIAL FIBRILLATION WITH RVR (HCC): ICD-10-CM

## 2022-10-19 PROCEDURE — 1090F PRES/ABSN URINE INCON ASSESS: CPT | Performed by: INTERNAL MEDICINE

## 2022-10-19 PROCEDURE — 1123F ACP DISCUSS/DSCN MKR DOCD: CPT | Performed by: INTERNAL MEDICINE

## 2022-10-19 PROCEDURE — G8484 FLU IMMUNIZE NO ADMIN: HCPCS | Performed by: INTERNAL MEDICINE

## 2022-10-19 PROCEDURE — G8427 DOCREV CUR MEDS BY ELIG CLIN: HCPCS | Performed by: INTERNAL MEDICINE

## 2022-10-19 PROCEDURE — 1036F TOBACCO NON-USER: CPT | Performed by: INTERNAL MEDICINE

## 2022-10-19 PROCEDURE — 99214 OFFICE O/P EST MOD 30 MIN: CPT | Performed by: INTERNAL MEDICINE

## 2022-10-19 PROCEDURE — G8420 CALC BMI NORM PARAMETERS: HCPCS | Performed by: INTERNAL MEDICINE

## 2022-10-19 ASSESSMENT — ENCOUNTER SYMPTOMS
EYE PAIN: 0
GASTROINTESTINAL NEGATIVE: 1
EYES NEGATIVE: 1
CHEST TIGHTNESS: 0
RESPIRATORY NEGATIVE: 1
ABDOMINAL PAIN: 0
BACK PAIN: 0
ALLERGIC/IMMUNOLOGIC NEGATIVE: 1
SHORTNESS OF BREATH: 0
PHOTOPHOBIA: 0

## 2022-10-19 NOTE — PROGRESS NOTES
polyethylene glycol (GLYCOLAX) 17 GM/SCOOP powder, Take 17 g by mouth as needed, Disp: , Rfl:     pravastatin (PRAVACHOL) 80 MG tablet, Take 80 mg by mouth at bedtime, Disp: , Rfl:     sucralfate (CARAFATE) 1 GM tablet, Take 1 g by mouth 4 times daily (before meals and nightly), Disp: , Rfl:     SUMAtriptan (IMITREX) 100 MG tablet, Take 100 mg by mouth once as needed, Disp: , Rfl:     PE-Shark Liver Oil-Cocoa Buttr (HEMORRHOID RE), Place rectally 4 times daily as needed (Patient not taking: No sig reported), Disp: , Rfl:     acetaminophen (TYLENOL) 325 MG tablet, Take 650 mg by mouth every 6 hours as needed for Pain (Patient not taking: No sig reported), Disp: , Rfl:   Allergies   Allergen Reactions    Azithromycin     Cephalexin Other (See Comments)    Hydrochlorothiazide     Nsaids      Per facility documents. Takes 81 mg aspirin daily    Simvastatin     Triamterene     Penicillins Rash    Sulfa Antibiotics Rash     Past Medical History:   Diagnosis Date    Adjustment disorder     Anxiety     Arthritis     Atrial fibrillation (HCC)     CAD (coronary artery disease)     CHF (congestive heart failure) (HCC)     chronic systolic dysfunction    Chronic pain syndrome     Constipation, unspecified     Current use of long term anticoagulation     Eliquis and Aspirin    Debility     Depression     Disorder of facial nerve, unspecified     Dry eye syndrome of unspecified lacrimal gland     Dysuria     Fibromyalgia     Gastro-esophageal reflux disease with esophagitis, without bleeding     H/O heart artery stent     Pt has 2 stents    Hard of hearing     History of COVID-19 dx:2/2021    pt not hospitalized    History of depression     History of essential hypertension     History of gastric ulcer     History of hypothyroidism     History of skin cancer     Hyperlipidemia     Hypertension     Hypothyroidism     Insomnia, unspecified     Knee pain, left     Migraine, unspecified, not intractable, without status migrainosus Multinodular goiter     Neuropathy     Opioid use, unspecified, uncomplicated     Osteoporosis     Other chest pain     Other chronic pain     Other hemorrhoids     Other idiopathic peripheral autonomic neuropathy     Peptic ulcer, site unspecified, unspecified as acute or chronic, without hemorrhage or perforation     Peripheral neuropathy     Postmenopausal atrophic vaginitis     PUD (peptic ulcer disease)     Spinal stenosis     Unspecified systolic (congestive) heart failure (HCC)     Urinary incontinence     Urinary tract infection, site not specified     Vitamin B12 deficiency anemia, unspecified     Vitamin D deficiency     Vitamin deficiency, unspecified      Past Surgical History:   Procedure Laterality Date    APPENDECTOMY      CARDIAC CATHETERIZATION      stent X2    CARPAL TUNNEL RELEASE      CHOLECYSTECTOMY      COLONOSCOPY      GI      EGD; food bolus 2021    TOTAL KNEE ARTHROPLASTY Right     UPPER GASTROINTESTINAL ENDOSCOPY      UPPER GASTROINTESTINAL ENDOSCOPY N/A 10/5/2022    EGD DILATION LEMON performed by Melissa Arce MD at [facility] ENDOSCOPY     Family History   Problem Relation Age of Onset    Hypertension Other      Social History     Tobacco Use    Smoking status: Never    Smokeless tobacco: Never   Substance Use Topics    Alcohol use: No       ROS:  Review of Systems   Constitutional: Negative. Negative for fever. HENT:  Negative for hearing loss, nosebleeds and tinnitus. Eyes: Negative. Negative for photophobia and pain. Respiratory: Negative. Negative for chest tightness and shortness of breath. Cardiovascular: Negative. Negative for chest pain, palpitations and leg swelling. Gastrointestinal: Negative. Negative for abdominal pain. Endocrine: Negative. Negative for cold intolerance and heat intolerance. Genitourinary: Negative. Negative for dysuria. Musculoskeletal: Negative. Negative for back pain and joint swelling. Skin: Negative. Negative for rash. Allergic/Immunologic: Negative. Negative for immunocompromised state. Neurological: Negative. Negative for dizziness, syncope and light-headedness. Hematological: Negative. Does not bruise/bleed easily. Psychiatric/Behavioral: Negative. Negative for suicidal ideas. PHYSICAL EXAM:  Physical Exam  Constitutional:       General: She is not in acute distress. Appearance: She is not ill-appearing. HENT:      Head: Normocephalic and atraumatic. Nose: No congestion. Mouth/Throat:      Mouth: Mucous membranes are moist.   Eyes:      Extraocular Movements: Extraocular movements intact. Pupils: Pupils are equal, round, and reactive to light. Cardiovascular:      Rate and Rhythm: Normal rate and regular rhythm. Heart sounds: No murmur heard. No friction rub. No gallop. Pulmonary:      Effort: No respiratory distress. Breath sounds: No wheezing or rhonchi. Musculoskeletal:         General: No swelling. Cervical back: Normal range of motion. Right lower leg: No edema. Left lower leg: No edema. Skin:     General: Skin is warm and dry. Findings: No rash. Neurological:      General: No focal deficit present. Mental Status: She is oriented to person, place, and time. Psychiatric:         Mood and Affect: Mood normal.         Behavior: Behavior normal.         Judgment: Judgment normal.        /78   Pulse 80   Ht 5' (1.524 m)   Wt 104 lb (47.2 kg)   BMI 20.31 kg/m²      Wt Readings from Last 10 Encounters:   10/19/22 104 lb (47.2 kg)   10/05/22 105 lb (47.6 kg)   09/20/22 106 lb (48.1 kg)   09/17/22 106 lb (48.1 kg)   09/07/22 106 lb (48.1 kg)   07/04/22 110 lb (49.9 kg)   05/17/22 119 lb (54 kg)   02/03/22 118 lb (53.5 kg)   08/10/21 129 lb 6.4 oz (58.7 kg)   05/19/21 121 lb (54.9 kg)           Medical problems and test results were reviewed with the patient today.            Lab Results   Component Value Date/Time    BUN 31 09/07/2022 09:28 AM     No results found for: MADELEINE, CREAPOC, CREA  Lab Results   Component Value Date/Time    K 3.9 09/07/2022 09:28 AM       No results found for: CHOL, CHOLPOCT, CHOLX, CHLST, CHOLV, HDL, HDLPOC, HDLC, LDL, LDLC, VLDLC, VLDL, TGLX, TRIGL    ASSESSMENT and PLAN    ICD-10-CM    1. Chronic systolic congestive heart failure (HCC)  I50.22       2. Atrial fibrillation with RVR (HCC)  I48.91       3. Mixed hyperlipidemia  E78.2           IMPRESSION:  1) AFib -Eliquis 2.5 mg twice daily   2) sCHF - continue Lasix 40 mg half a tablet as needed lisinopril 2.5 mg daily carvedilol 6.25 mg twice daily    3) LIpids - on Pravastatin 80 mg probably will no longer follow regular lipid panels      ALL ORDERS THIS ENCOUNTER  No orders of the defined types were placed in this encounter. Follow up in 3 months. Thank you for allowing me to participate in this patient's care. Please call or contact me if there are any questions or concerns regarding the above.       Isabella Wolfe MD  10/19/22  4:07 PM

## 2023-01-19 ENCOUNTER — OFFICE VISIT (OUTPATIENT)
Dept: CARDIOLOGY CLINIC | Age: 88
End: 2023-01-19

## 2023-01-19 VITALS
SYSTOLIC BLOOD PRESSURE: 118 MMHG | BODY MASS INDEX: 21.2 KG/M2 | HEIGHT: 60 IN | HEART RATE: 72 BPM | DIASTOLIC BLOOD PRESSURE: 58 MMHG | WEIGHT: 108 LBS

## 2023-01-19 DIAGNOSIS — I10 PRIMARY HYPERTENSION: Primary | ICD-10-CM

## 2023-01-19 DIAGNOSIS — I48.91 ATRIAL FIBRILLATION WITH RVR (HCC): ICD-10-CM

## 2023-01-19 DIAGNOSIS — I50.22 CHRONIC SYSTOLIC CONGESTIVE HEART FAILURE (HCC): ICD-10-CM

## 2023-01-19 RX ORDER — FERROUS SULFATE 325(65) MG
325 TABLET ORAL
COMMUNITY

## 2023-01-19 RX ORDER — HYDROXYZINE HYDROCHLORIDE 25 MG/1
25 TABLET, FILM COATED ORAL 3 TIMES DAILY PRN
COMMUNITY

## 2023-01-19 RX ORDER — HYDROCODONE BITARTRATE AND ACETAMINOPHEN 10; 325 MG/1; MG/1
1 TABLET ORAL EVERY 6 HOURS PRN
COMMUNITY

## 2023-01-19 RX ORDER — FENOFIBRATE 145 MG/1
145 TABLET, COATED ORAL DAILY
COMMUNITY

## 2023-01-19 ASSESSMENT — ENCOUNTER SYMPTOMS
RESPIRATORY NEGATIVE: 1
BACK PAIN: 0
CHEST TIGHTNESS: 0
EYE PAIN: 0
PHOTOPHOBIA: 0
GASTROINTESTINAL NEGATIVE: 1
SHORTNESS OF BREATH: 0
ABDOMINAL PAIN: 0
EYES NEGATIVE: 1
ALLERGIC/IMMUNOLOGIC NEGATIVE: 1

## 2023-01-19 NOTE — PROGRESS NOTES
[address], [address]  PHONE: [phone]      23    NAME:  Michaela Roche  : 1928  MRN: [id number]         SUBJECTIVE:   Michaela Roche is a 80 y.o. female seen for follow up of:      Chief Complaint   Patient presents with    Hypertension     3 month visit and med review        Cardiac Hx (Reviewed and summarized by me): L Bruno patient   1) systolic heart failure               Echo 3/29/18 - LVEF 20-25%               10/21/20 - LVEF 47-32%, with diastolic dysfunction   2) Holter 48 hr - 7% PVCs 18   3) AFib with RVR                Ablation Dr Fernanda Gonzalez 2018     Dr Mendel Rack [phone] this is the doctors caring for her at her facility. HPI:  Doing well. Tolerating medications. Has follow-up with urology who recommends a permanent indwelling catheter. No further bleeding in her urine. Blood pressures well controlled shortness of breath is controlled. Past Medical History, Past Surgical History, Family history, Social History, and Medications were all reviewed with the patient today and updated as necessary.        Current Outpatient Medications:     fenofibrate (TRICOR) 145 MG tablet, Take 145 mg by mouth daily, Disp: , Rfl:     ferrous sulfate (IRON 325) 325 (65 Fe) MG tablet, Take 325 mg by mouth daily (with breakfast), Disp: , Rfl:     hydrOXYzine HCl (ATARAX) 25 MG tablet, Take 25 mg by mouth 3 times daily as needed for Itching, Disp: , Rfl:     HYDROcodone-acetaminophen (NORCO)  MG per tablet, Take 1 tablet by mouth every 6 hours as needed for Pain., Disp: , Rfl:     azelastine (ASTELIN) 0.1 % nasal spray, 1 spray by Nasal route 2 times daily Use in each nostril as directed, Disp: , Rfl:     calcium citrate (CALCITRATE) 950 (200 Ca) MG tablet, Take 1 tablet by mouth daily, Disp: , Rfl:     celecoxib (CELEBREX) 200 MG capsule, Take 200 mg by mouth daily, Disp: , Rfl:     estradiol (ESTRACE) 0.1 MG/GM vaginal cream, Place 1 g vaginally once a week Wednesday, Disp: , Rfl:     NALOXONE HCL NA, 4 mg by Nasal route as needed (one spray, nasal, As needed, one spray into nostril prn for overdose up to one dose), Disp: , Rfl:     dextran 70-hypromellose (TEARS NATURALE) 0.1-0.3 % SOLN opthalmic solution, Place 1 drop into both eyes 3 times daily as needed, Disp: , Rfl:     potassium chloride (MICRO-K) 10 MEQ extended release capsule, Take 10 mEq by mouth daily, Disp: , Rfl:     apixaban (ELIQUIS) 2.5 MG TABS tablet, Take 2.5 mg by mouth 2 times daily, Disp: , Rfl:     carvedilol (COREG) 6.25 MG tablet, Take 6.25 mg by mouth 2 times daily (with meals), Disp: , Rfl:     Cholecalciferol 50 MCG (2000 UT) TABS, Take 1,000 Units by mouth daily, Disp: , Rfl:     cyanocobalamin 1000 MCG tablet, Take 1,000 mcg by mouth daily, Disp: , Rfl:     DULoxetine (CYMBALTA) 60 MG extended release capsule, Take 60 mg by mouth, Disp: , Rfl:     furosemide (LASIX) 40 MG tablet, 1/2 tablet daily PRN swelling, Disp: , Rfl:     gabapentin (NEURONTIN) 400 MG capsule, 1 cap BID & 2 caps qHS Indications: 400mg bid and 800mg at hs, Disp: , Rfl:     levothyroxine (SYNTHROID) 50 MCG tablet, Take 50 mcg by mouth every morning (before breakfast), Disp: , Rfl:     LORazepam (ATIVAN) 0.5 MG tablet, Take by mouth 2 times daily as needed. , Disp: , Rfl:     magnesium oxide (MAG-OX) 400 (240 Mg) MG tablet, Take 400 mg by mouth 2 times daily, Disp: , Rfl:     mirtazapine (REMERON) 15 MG tablet, Take 15 mg by mouth nightly, Disp: , Rfl:     nitroGLYCERIN (NITROSTAT) 0.4 MG SL tablet, Place 0.4 mg under the tongue, Disp: , Rfl:     omeprazole (PRILOSEC) 20 MG delayed release capsule, Take 20 mg by mouth 2 times daily, Disp: , Rfl:     pravastatin (PRAVACHOL) 80 MG tablet, Take 80 mg by mouth at bedtime, Disp: , Rfl:     sucralfate (CARAFATE) 1 GM tablet, Take 1 g by mouth 4 times daily (before meals and nightly), Disp: , Rfl:     SUMAtriptan (IMITREX) 100 MG tablet, Take 100 mg by mouth once as needed, Disp: , Rfl:     bisacodyl (DULCOLAX) 10 MG suppository, Place 10 mg rectally daily as needed for Constipation, Disp: , Rfl:     aluminum & magnesium hydroxide-simethicone (MYLANTA) 400-400-40 MG/5ML SUSP, Take 30 mLs by mouth 3 times daily as needed, Disp: , Rfl:     acetaminophen (TYLENOL) 325 MG tablet, Take 650 mg by mouth every 6 hours as needed for Pain (Patient not taking: No sig reported), Disp: , Rfl:     amiodarone (CORDARONE) 200 MG tablet, Take 200 mg by mouth daily, Disp: , Rfl:   Allergies   Allergen Reactions    Azithromycin     Cephalexin Other (See Comments)    Hydrochlorothiazide     Nsaids      Per facility documents.Takes 81 mg aspirin daily    Simvastatin     Triamterene     Penicillins Rash    Sulfa Antibiotics Rash     Past Medical History:   Diagnosis Date    Adjustment disorder     Anxiety     Arthritis     Atrial fibrillation (HCC)     CAD (coronary artery disease)     CHF (congestive heart failure) (HCC)     chronic systolic dysfunction    Chronic pain syndrome     Constipation, unspecified     Current use of long term anticoagulation     Eliquis and Aspirin    Debility     Depression     Disorder of facial nerve, unspecified     Dry eye syndrome of unspecified lacrimal gland     Dysuria     Fibromyalgia     Gastro-esophageal reflux disease with esophagitis, without bleeding     H/O heart artery stent     Pt has 2 stents    Hard of hearing     History of COVID-19 dx:2/2021    pt not hospitalized    History of depression     History of essential hypertension     History of gastric ulcer     History of hypothyroidism     History of skin cancer     Hyperlipidemia     Hypertension     Hypothyroidism     Insomnia, unspecified     Knee pain, left     Migraine, unspecified, not intractable, without status migrainosus     Multinodular goiter     Neuropathy     Opioid use, unspecified, uncomplicated     Osteoporosis     Other chest pain      Other chronic pain     Other hemorrhoids     Other idiopathic peripheral autonomic neuropathy     Peptic ulcer, site unspecified, unspecified as acute or chronic, without hemorrhage or perforation     Peripheral neuropathy     Postmenopausal atrophic vaginitis     PUD (peptic ulcer disease)     Spinal stenosis     Unspecified systolic (congestive) heart failure (HCC)     Urinary incontinence     Urinary tract infection, site not specified     Vitamin B12 deficiency anemia, unspecified     Vitamin D deficiency     Vitamin deficiency, unspecified      Past Surgical History:   Procedure Laterality Date    APPENDECTOMY      CARDIAC CATHETERIZATION      stent X2    CARPAL TUNNEL RELEASE      CHOLECYSTECTOMY      COLONOSCOPY      GI      EGD; food bolus 2021    TOTAL KNEE ARTHROPLASTY Right     UPPER GASTROINTESTINAL ENDOSCOPY      UPPER GASTROINTESTINAL ENDOSCOPY N/A 10/5/2022    EGD DILATION LEMON performed by Wagner Diehl MD at [facility]     Family History   Problem Relation Age of Onset    Hypertension Other      Social History     Tobacco Use    Smoking status: Never    Smokeless tobacco: Never   Substance Use Topics    Alcohol use: No       ROS:  Review of Systems   Constitutional: Negative.  Negative for fever.   HENT:  Negative for hearing loss, nosebleeds and tinnitus.    Eyes: Negative.  Negative for photophobia and pain.   Respiratory: Negative.  Negative for chest tightness and shortness of breath.    Cardiovascular: Negative.  Negative for chest pain, palpitations and leg swelling.   Gastrointestinal: Negative.  Negative for abdominal pain.   Endocrine: Negative.  Negative for cold intolerance and heat intolerance.   Genitourinary: Negative.  Negative for dysuria.   Musculoskeletal: Negative.  Negative for back pain and joint swelling.   Skin: Negative.  Negative for rash.   Allergic/Immunologic: Negative.  Negative for immunocompromised state.   Neurological: Negative.  Negative for dizziness,  syncope and light-headedness. Hematological: Negative. Does not bruise/bleed easily. Psychiatric/Behavioral: Negative. Negative for suicidal ideas. PHYSICAL EXAM:  Physical Exam  Constitutional:       General: She is not in acute distress. Appearance: She is not ill-appearing. HENT:      Head: Normocephalic and atraumatic. Nose: No congestion. Mouth/Throat:      Mouth: Mucous membranes are moist.   Eyes:      Extraocular Movements: Extraocular movements intact. Pupils: Pupils are equal, round, and reactive to light. Cardiovascular:      Rate and Rhythm: Normal rate and regular rhythm. Heart sounds: No murmur heard. No friction rub. No gallop. Pulmonary:      Effort: No respiratory distress. Breath sounds: No wheezing or rhonchi. Musculoskeletal:         General: No swelling. Cervical back: Normal range of motion. Right lower leg: No edema. Left lower leg: No edema. Skin:     General: Skin is warm and dry. Findings: No rash. Neurological:      General: No focal deficit present. Mental Status: She is oriented to person, place, and time. Psychiatric:         Mood and Affect: Mood normal.         Behavior: Behavior normal.         Judgment: Judgment normal.        BP (!) 118/58   Pulse 72   Ht 5' (1.524 m)   Wt 108 lb (49 kg) Comment: with shoes  BMI 21.09 kg/m²      Wt Readings from Last 10 Encounters:   01/19/23 108 lb (49 kg)   10/19/22 104 lb (47.2 kg)   10/05/22 105 lb (47.6 kg)   09/20/22 106 lb (48.1 kg)   09/17/22 106 lb (48.1 kg)   09/07/22 106 lb (48.1 kg)   07/04/22 110 lb (49.9 kg)   05/17/22 119 lb (54 kg)   02/03/22 118 lb (53.5 kg)   08/10/21 129 lb 6.4 oz (58.7 kg)           Medical problems and test results were reviewed with the patient today.            Lab Results   Component Value Date/Time    BUN 31 09/07/2022 09:28 AM     No results found for: MADEELINE, CREAPOC, CREA  Lab Results   Component Value Date/Time    K 3.9 09/07/2022 09:28 AM       No results found for: CHOL, CHOLPOCT, CHOLX, CHLST, CHOLV, HDL, HDLPOC, HDLC, LDL, LDLC, VLDLC, VLDL, TGLX, TRIGL    ASSESSMENT and PLAN    ICD-10-CM    1. Primary hypertension  I10       2. Chronic systolic congestive heart failure (HCC)  I50.22       3. Atrial fibrillation with RVR (HCC)  I48.91           IMPRESSION:  1) AFib -Eliquis 2.5 mg twice daily  2) sCHF - continue Lasix 40 mg half a tablet as needed lisinopril 2.5 mg daily carvedilol 6.25 mg twice daily   3) LIpids - on Pravastatin 80 mg probably will no longer follow regular lipid panels      ALL ORDERS THIS ENCOUNTER  Orders Placed This Encounter    fenofibrate (TRICOR) 145 MG tablet     Sig: Take 145 mg by mouth daily    ferrous sulfate (IRON 325) 325 (65 Fe) MG tablet     Sig: Take 325 mg by mouth daily (with breakfast)    hydrOXYzine HCl (ATARAX) 25 MG tablet     Sig: Take 25 mg by mouth 3 times daily as needed for Itching    HYDROcodone-acetaminophen (NORCO)  MG per tablet     Sig: Take 1 tablet by mouth every 6 hours as needed for Pain.        Follow up in 6 months.     Thank you for allowing me to participate in this patient's care.  Please call or contact me if there are any questions or concerns regarding the above.      Bishop Arzola MD  01/19/23  1:50 PM

## 2023-06-17 ENCOUNTER — APPOINTMENT (OUTPATIENT)
Dept: GENERAL RADIOLOGY | Age: 88
DRG: 698 | End: 2023-06-17
Payer: MEDICARE

## 2023-06-17 ENCOUNTER — HOSPITAL ENCOUNTER (INPATIENT)
Age: 88
LOS: 8 days | Discharge: NURSING, REHAB OR LONG-TERM CARE | DRG: 698 | End: 2023-06-25
Attending: EMERGENCY MEDICINE | Admitting: FAMILY MEDICINE
Payer: MEDICARE

## 2023-06-17 DIAGNOSIS — N39.0 URINARY TRACT INFECTION ASSOCIATED WITH INDWELLING URETHRAL CATHETER, INITIAL ENCOUNTER (HCC): Primary | ICD-10-CM

## 2023-06-17 DIAGNOSIS — J18.9 PNEUMONIA OF BOTH LUNGS DUE TO INFECTIOUS ORGANISM, UNSPECIFIED PART OF LUNG: ICD-10-CM

## 2023-06-17 DIAGNOSIS — R41.0 CONFUSION: ICD-10-CM

## 2023-06-17 DIAGNOSIS — R53.1 GENERAL WEAKNESS: ICD-10-CM

## 2023-06-17 DIAGNOSIS — I50.23 ACUTE ON CHRONIC SYSTOLIC CHF (CONGESTIVE HEART FAILURE) (HCC): ICD-10-CM

## 2023-06-17 DIAGNOSIS — T83.511A URINARY TRACT INFECTION ASSOCIATED WITH INDWELLING URETHRAL CATHETER, INITIAL ENCOUNTER (HCC): Primary | ICD-10-CM

## 2023-06-17 PROBLEM — R31.9 HEMATURIA: Status: ACTIVE | Noted: 2023-06-17

## 2023-06-17 PROBLEM — Z79.01 ANTICOAGULANT LONG-TERM USE: Status: ACTIVE | Noted: 2018-06-28

## 2023-06-17 PROBLEM — E87.1 HYPONATREMIA: Status: ACTIVE | Noted: 2023-01-01

## 2023-06-17 LAB
ALBUMIN SERPL-MCNC: 2.8 G/DL (ref 3.2–4.6)
ALBUMIN/GLOB SERPL: 0.9 (ref 0.4–1.6)
ALP SERPL-CCNC: 69 U/L (ref 50–136)
ALT SERPL-CCNC: 81 U/L (ref 12–65)
ANION GAP SERPL CALC-SCNC: 7 MMOL/L (ref 2–11)
APPEARANCE UR: ABNORMAL
AST SERPL-CCNC: 77 U/L (ref 15–37)
BACTERIA URNS QL MICRO: ABNORMAL /HPF
BASOPHILS # BLD: 0 K/UL (ref 0–0.2)
BASOPHILS NFR BLD: 0 % (ref 0–2)
BILIRUB SERPL-MCNC: 0.4 MG/DL (ref 0.2–1.1)
BILIRUB UR QL: NEGATIVE
BUN SERPL-MCNC: 20 MG/DL (ref 8–23)
CALCIUM SERPL-MCNC: 8.1 MG/DL (ref 8.3–10.4)
CHLORIDE SERPL-SCNC: 100 MMOL/L (ref 101–110)
CO2 SERPL-SCNC: 22 MMOL/L (ref 21–32)
COLOR UR: ABNORMAL
CREAT SERPL-MCNC: 0.8 MG/DL (ref 0.6–1)
CRYSTALS URNS QL MICRO: ABNORMAL /LPF
DIFFERENTIAL METHOD BLD: ABNORMAL
EKG ATRIAL RATE: 99 BPM
EKG DIAGNOSIS: NORMAL
EKG Q-T INTERVAL: 421 MS
EKG QRS DURATION: 184 MS
EKG QTC CALCULATION (BAZETT): 527 MS
EKG R AXIS: 6 DEGREES
EKG T AXIS: 141 DEGREES
EKG VENTRICULAR RATE: 94 BPM
EOSINOPHIL # BLD: 0.1 K/UL (ref 0–0.8)
EOSINOPHIL NFR BLD: 2 % (ref 0.5–7.8)
EPI CELLS #/AREA URNS HPF: ABNORMAL /HPF
ERYTHROCYTE [DISTWIDTH] IN BLOOD BY AUTOMATED COUNT: 14.2 % (ref 11.9–14.6)
GLOBULIN SER CALC-MCNC: 3 G/DL (ref 2.8–4.5)
GLUCOSE SERPL-MCNC: 197 MG/DL (ref 65–100)
GLUCOSE UR STRIP.AUTO-MCNC: NEGATIVE MG/DL
HCT VFR BLD AUTO: 35.5 % (ref 35.8–46.3)
HGB BLD-MCNC: 11.9 G/DL (ref 11.7–15.4)
HGB UR QL STRIP: ABNORMAL
IMM GRANULOCYTES # BLD AUTO: 0 K/UL (ref 0–0.5)
IMM GRANULOCYTES NFR BLD AUTO: 0 % (ref 0–5)
KETONES UR QL STRIP.AUTO: NEGATIVE MG/DL
LACTATE SERPL-SCNC: 1.8 MMOL/L (ref 0.4–2)
LACTATE SERPL-SCNC: 1.9 MMOL/L (ref 0.4–2)
LEUKOCYTE ESTERASE UR QL STRIP.AUTO: ABNORMAL
LYMPHOCYTES # BLD: 0.9 K/UL (ref 0.5–4.6)
LYMPHOCYTES NFR BLD: 10 % (ref 13–44)
MCH RBC QN AUTO: 30.4 PG (ref 26.1–32.9)
MCHC RBC AUTO-ENTMCNC: 33.5 G/DL (ref 31.4–35)
MCV RBC AUTO: 90.8 FL (ref 82–102)
MONOCYTES # BLD: 0.7 K/UL (ref 0.1–1.3)
MONOCYTES NFR BLD: 8 % (ref 4–12)
NEUTS SEG # BLD: 6.5 K/UL (ref 1.7–8.2)
NEUTS SEG NFR BLD: 80 % (ref 43–78)
NITRITE UR QL STRIP.AUTO: POSITIVE
NRBC # BLD: 0 K/UL (ref 0–0.2)
NT PRO BNP: 4388 PG/ML
PH UR STRIP: 7 (ref 5–9)
PLATELET # BLD AUTO: 333 K/UL (ref 150–450)
PMV BLD AUTO: 10.3 FL (ref 9.4–12.3)
POTASSIUM SERPL-SCNC: 4.8 MMOL/L (ref 3.5–5.1)
PROT SERPL-MCNC: 5.8 G/DL (ref 6.3–8.2)
PROT UR STRIP-MCNC: 300 MG/DL
RBC # BLD AUTO: 3.91 M/UL (ref 4.05–5.2)
RBC #/AREA URNS HPF: >100 /HPF
SARS-COV-2 RDRP RESP QL NAA+PROBE: NOT DETECTED
SODIUM SERPL-SCNC: 129 MMOL/L (ref 133–143)
SOURCE: NORMAL
SP GR UR REFRACTOMETRY: 1.02 (ref 1–1.02)
UROBILINOGEN UR QL STRIP.AUTO: 0.2 EU/DL (ref 0.2–1)
WBC # BLD AUTO: 8.2 K/UL (ref 4.3–11.1)
WBC URNS QL MICRO: >100 /HPF

## 2023-06-17 PROCEDURE — 6360000002 HC RX W HCPCS: Performed by: EMERGENCY MEDICINE

## 2023-06-17 PROCEDURE — 6360000002 HC RX W HCPCS: Performed by: FAMILY MEDICINE

## 2023-06-17 PROCEDURE — 93005 ELECTROCARDIOGRAM TRACING: CPT | Performed by: EMERGENCY MEDICINE

## 2023-06-17 PROCEDURE — 2580000003 HC RX 258: Performed by: EMERGENCY MEDICINE

## 2023-06-17 PROCEDURE — 87635 SARS-COV-2 COVID-19 AMP PRB: CPT

## 2023-06-17 PROCEDURE — 99285 EMERGENCY DEPT VISIT HI MDM: CPT

## 2023-06-17 PROCEDURE — 96375 TX/PRO/DX INJ NEW DRUG ADDON: CPT

## 2023-06-17 PROCEDURE — 87040 BLOOD CULTURE FOR BACTERIA: CPT

## 2023-06-17 PROCEDURE — 6370000000 HC RX 637 (ALT 250 FOR IP): Performed by: FAMILY MEDICINE

## 2023-06-17 PROCEDURE — 87086 URINE CULTURE/COLONY COUNT: CPT

## 2023-06-17 PROCEDURE — 94761 N-INVAS EAR/PLS OXIMETRY MLT: CPT

## 2023-06-17 PROCEDURE — 2580000003 HC RX 258: Performed by: FAMILY MEDICINE

## 2023-06-17 PROCEDURE — 83880 ASSAY OF NATRIURETIC PEPTIDE: CPT

## 2023-06-17 PROCEDURE — 83605 ASSAY OF LACTIC ACID: CPT

## 2023-06-17 PROCEDURE — 87186 SC STD MICRODIL/AGAR DIL: CPT

## 2023-06-17 PROCEDURE — 93010 ELECTROCARDIOGRAM REPORT: CPT | Performed by: INTERNAL MEDICINE

## 2023-06-17 PROCEDURE — 1100000003 HC PRIVATE W/ TELEMETRY

## 2023-06-17 PROCEDURE — 96365 THER/PROPH/DIAG IV INF INIT: CPT

## 2023-06-17 PROCEDURE — 71045 X-RAY EXAM CHEST 1 VIEW: CPT

## 2023-06-17 PROCEDURE — 87088 URINE BACTERIA CULTURE: CPT

## 2023-06-17 PROCEDURE — 85025 COMPLETE CBC W/AUTO DIFF WBC: CPT

## 2023-06-17 PROCEDURE — 80053 COMPREHEN METABOLIC PANEL: CPT

## 2023-06-17 PROCEDURE — 81001 URINALYSIS AUTO W/SCOPE: CPT

## 2023-06-17 RX ORDER — SODIUM CHLORIDE 0.9 % (FLUSH) 0.9 %
5-40 SYRINGE (ML) INJECTION PRN
Status: DISCONTINUED | OUTPATIENT
Start: 2023-06-17 | End: 2023-06-25 | Stop reason: HOSPADM

## 2023-06-17 RX ORDER — LORAZEPAM 0.5 MG/1
0.5 TABLET ORAL 2 TIMES DAILY PRN
Status: DISCONTINUED | OUTPATIENT
Start: 2023-06-17 | End: 2023-06-22

## 2023-06-17 RX ORDER — PANTOPRAZOLE SODIUM 40 MG/1
40 TABLET, DELAYED RELEASE ORAL
Status: DISCONTINUED | OUTPATIENT
Start: 2023-06-18 | End: 2023-06-25 | Stop reason: HOSPADM

## 2023-06-17 RX ORDER — AZELASTINE 1 MG/ML
1 SPRAY, METERED NASAL 2 TIMES DAILY
Status: DISCONTINUED | OUTPATIENT
Start: 2023-06-18 | End: 2023-06-25 | Stop reason: HOSPADM

## 2023-06-17 RX ORDER — MAGNESIUM HYDROXIDE/ALUMINUM HYDROXICE/SIMETHICONE 120; 1200; 1200 MG/30ML; MG/30ML; MG/30ML
30 SUSPENSION ORAL EVERY 12 HOURS PRN
Status: DISCONTINUED | OUTPATIENT
Start: 2023-06-17 | End: 2023-06-25 | Stop reason: HOSPADM

## 2023-06-17 RX ORDER — 0.9 % SODIUM CHLORIDE 0.9 %
500 INTRAVENOUS SOLUTION INTRAVENOUS
Status: COMPLETED | OUTPATIENT
Start: 2023-06-17 | End: 2023-06-18

## 2023-06-17 RX ORDER — FERROUS SULFATE 325(65) MG
325 TABLET ORAL
Status: DISCONTINUED | OUTPATIENT
Start: 2023-06-18 | End: 2023-06-25 | Stop reason: HOSPADM

## 2023-06-17 RX ORDER — POLYETHYLENE GLYCOL 3350 17 G/17G
17 POWDER, FOR SOLUTION ORAL DAILY PRN
Status: DISCONTINUED | OUTPATIENT
Start: 2023-06-17 | End: 2023-06-25 | Stop reason: HOSPADM

## 2023-06-17 RX ORDER — LANOLIN ALCOHOL/MO/W.PET/CERES
400 CREAM (GRAM) TOPICAL 2 TIMES DAILY
Status: DISCONTINUED | OUTPATIENT
Start: 2023-06-17 | End: 2023-06-25 | Stop reason: HOSPADM

## 2023-06-17 RX ORDER — GUAIFENESIN 600 MG/1
1200 TABLET, EXTENDED RELEASE ORAL 2 TIMES DAILY
Status: DISCONTINUED | OUTPATIENT
Start: 2023-06-17 | End: 2023-06-20

## 2023-06-17 RX ORDER — CALCIUM CARBONATE 500(1250)
500 TABLET ORAL DAILY
Status: DISCONTINUED | OUTPATIENT
Start: 2023-06-18 | End: 2023-06-25 | Stop reason: HOSPADM

## 2023-06-17 RX ORDER — CARBOXYMETHYLCELLULOSE SODIUM 10 MG/ML
1 GEL OPHTHALMIC 3 TIMES DAILY PRN
Status: DISCONTINUED | OUTPATIENT
Start: 2023-06-17 | End: 2023-06-25 | Stop reason: HOSPADM

## 2023-06-17 RX ORDER — POTASSIUM CHLORIDE 750 MG/1
10 TABLET, EXTENDED RELEASE ORAL DAILY
Status: DISCONTINUED | OUTPATIENT
Start: 2023-06-18 | End: 2023-06-20

## 2023-06-17 RX ORDER — ACETAMINOPHEN 650 MG/1
650 SUPPOSITORY RECTAL EVERY 6 HOURS PRN
Status: DISCONTINUED | OUTPATIENT
Start: 2023-06-17 | End: 2023-06-25 | Stop reason: HOSPADM

## 2023-06-17 RX ORDER — SODIUM CHLORIDE 0.9 % (FLUSH) 0.9 %
5-40 SYRINGE (ML) INJECTION EVERY 12 HOURS SCHEDULED
Status: DISCONTINUED | OUTPATIENT
Start: 2023-06-17 | End: 2023-06-25 | Stop reason: HOSPADM

## 2023-06-17 RX ORDER — LEVOTHYROXINE SODIUM 0.05 MG/1
50 TABLET ORAL
Status: DISCONTINUED | OUTPATIENT
Start: 2023-06-18 | End: 2023-06-25 | Stop reason: HOSPADM

## 2023-06-17 RX ORDER — GABAPENTIN 400 MG/1
400 CAPSULE ORAL 2 TIMES DAILY
Status: DISCONTINUED | OUTPATIENT
Start: 2023-06-18 | End: 2023-06-25 | Stop reason: HOSPADM

## 2023-06-17 RX ORDER — BISACODYL 10 MG
10 SUPPOSITORY, RECTAL RECTAL DAILY PRN
Status: DISCONTINUED | OUTPATIENT
Start: 2023-06-17 | End: 2023-06-25 | Stop reason: HOSPADM

## 2023-06-17 RX ORDER — FENOFIBRATE 160 MG/1
160 TABLET ORAL DAILY
Status: DISCONTINUED | OUTPATIENT
Start: 2023-06-18 | End: 2023-06-25 | Stop reason: HOSPADM

## 2023-06-17 RX ORDER — SUCRALFATE 1 G/1
1 TABLET ORAL
Status: DISCONTINUED | OUTPATIENT
Start: 2023-06-17 | End: 2023-06-25 | Stop reason: HOSPADM

## 2023-06-17 RX ORDER — CARVEDILOL 3.12 MG/1
6.25 TABLET ORAL 2 TIMES DAILY WITH MEALS
Status: DISCONTINUED | OUTPATIENT
Start: 2023-06-17 | End: 2023-06-25 | Stop reason: HOSPADM

## 2023-06-17 RX ORDER — HYDROCODONE BITARTRATE AND ACETAMINOPHEN 10; 325 MG/1; MG/1
1 TABLET ORAL EVERY 6 HOURS PRN
Status: DISCONTINUED | OUTPATIENT
Start: 2023-06-17 | End: 2023-06-25 | Stop reason: HOSPADM

## 2023-06-17 RX ORDER — MIRTAZAPINE 15 MG/1
15 TABLET, FILM COATED ORAL NIGHTLY
Status: DISCONTINUED | OUTPATIENT
Start: 2023-06-17 | End: 2023-06-25 | Stop reason: HOSPADM

## 2023-06-17 RX ORDER — ACETAMINOPHEN 325 MG/1
650 TABLET ORAL EVERY 6 HOURS PRN
Status: DISCONTINUED | OUTPATIENT
Start: 2023-06-17 | End: 2023-06-25 | Stop reason: HOSPADM

## 2023-06-17 RX ORDER — AMIODARONE HYDROCHLORIDE 200 MG/1
200 TABLET ORAL DAILY
Status: DISCONTINUED | OUTPATIENT
Start: 2023-06-18 | End: 2023-06-25 | Stop reason: HOSPADM

## 2023-06-17 RX ORDER — FUROSEMIDE 10 MG/ML
20 INJECTION INTRAMUSCULAR; INTRAVENOUS ONCE
Status: COMPLETED | OUTPATIENT
Start: 2023-06-17 | End: 2023-06-17

## 2023-06-17 RX ORDER — DULOXETIN HYDROCHLORIDE 60 MG/1
60 CAPSULE, DELAYED RELEASE ORAL DAILY
Status: DISCONTINUED | OUTPATIENT
Start: 2023-06-18 | End: 2023-06-25 | Stop reason: HOSPADM

## 2023-06-17 RX ORDER — SODIUM CHLORIDE 9 MG/ML
INJECTION, SOLUTION INTRAVENOUS PRN
Status: DISCONTINUED | OUTPATIENT
Start: 2023-06-17 | End: 2023-06-25 | Stop reason: HOSPADM

## 2023-06-17 RX ORDER — HYDROXYZINE HYDROCHLORIDE 25 MG/1
25 TABLET, FILM COATED ORAL 3 TIMES DAILY PRN
Status: DISCONTINUED | OUTPATIENT
Start: 2023-06-17 | End: 2023-06-25 | Stop reason: HOSPADM

## 2023-06-17 RX ADMIN — LORAZEPAM 0.5 MG: 1 TABLET ORAL at 21:12

## 2023-06-17 RX ADMIN — HYDROCODONE BITARTRATE AND ACETAMINOPHEN 1 TABLET: 10; 325 TABLET ORAL at 21:11

## 2023-06-17 RX ADMIN — FUROSEMIDE 20 MG: 10 INJECTION, SOLUTION INTRAMUSCULAR; INTRAVENOUS at 20:45

## 2023-06-17 RX ADMIN — SODIUM CHLORIDE 500 ML: 9 INJECTION, SOLUTION INTRAVENOUS at 19:13

## 2023-06-17 RX ADMIN — CEFTRIAXONE 1000 MG: 1 INJECTION, POWDER, FOR SOLUTION INTRAMUSCULAR; INTRAVENOUS at 19:14

## 2023-06-17 RX ADMIN — VANCOMYCIN HYDROCHLORIDE 1250 MG: 10 INJECTION, POWDER, LYOPHILIZED, FOR SOLUTION INTRAVENOUS at 22:20

## 2023-06-17 ASSESSMENT — PAIN SCALES - GENERAL: PAINLEVEL_OUTOF10: 2

## 2023-06-17 ASSESSMENT — PAIN - FUNCTIONAL ASSESSMENT: PAIN_FUNCTIONAL_ASSESSMENT: 0-10

## 2023-06-17 ASSESSMENT — ENCOUNTER SYMPTOMS
GASTROINTESTINAL NEGATIVE: 1
COUGH: 1
ABDOMINAL PAIN: 0
SHORTNESS OF BREATH: 1
BACK PAIN: 0

## 2023-06-18 LAB
ALBUMIN SERPL-MCNC: 3.2 G/DL (ref 3.2–4.6)
ALBUMIN/GLOB SERPL: 1.1 (ref 0.4–1.6)
ALP SERPL-CCNC: 70 U/L (ref 50–136)
ALT SERPL-CCNC: 90 U/L (ref 12–65)
ANION GAP SERPL CALC-SCNC: 7 MMOL/L (ref 2–11)
AST SERPL-CCNC: 85 U/L (ref 15–37)
BASOPHILS # BLD: 0 K/UL (ref 0–0.2)
BASOPHILS NFR BLD: 0 % (ref 0–2)
BILIRUB SERPL-MCNC: 0.6 MG/DL (ref 0.2–1.1)
BUN SERPL-MCNC: 17 MG/DL (ref 8–23)
CALCIUM SERPL-MCNC: 8.5 MG/DL (ref 8.3–10.4)
CHLORIDE SERPL-SCNC: 100 MMOL/L (ref 101–110)
CO2 SERPL-SCNC: 24 MMOL/L (ref 21–32)
CREAT SERPL-MCNC: 0.7 MG/DL (ref 0.6–1)
DIFFERENTIAL METHOD BLD: ABNORMAL
EOSINOPHIL # BLD: 0.1 K/UL (ref 0–0.8)
EOSINOPHIL NFR BLD: 1 % (ref 0.5–7.8)
ERYTHROCYTE [DISTWIDTH] IN BLOOD BY AUTOMATED COUNT: 14.3 % (ref 11.9–14.6)
GLOBULIN SER CALC-MCNC: 2.9 G/DL (ref 2.8–4.5)
GLUCOSE SERPL-MCNC: 124 MG/DL (ref 65–100)
HCT VFR BLD AUTO: 38.7 % (ref 35.8–46.3)
HGB BLD-MCNC: 12.7 G/DL (ref 11.7–15.4)
IMM GRANULOCYTES # BLD AUTO: 0 K/UL (ref 0–0.5)
IMM GRANULOCYTES NFR BLD AUTO: 0 % (ref 0–5)
LYMPHOCYTES # BLD: 1 K/UL (ref 0.5–4.6)
LYMPHOCYTES NFR BLD: 11 % (ref 13–44)
MCH RBC QN AUTO: 29.7 PG (ref 26.1–32.9)
MCHC RBC AUTO-ENTMCNC: 32.8 G/DL (ref 31.4–35)
MCV RBC AUTO: 90.4 FL (ref 82–102)
MONOCYTES # BLD: 0.9 K/UL (ref 0.1–1.3)
MONOCYTES NFR BLD: 10 % (ref 4–12)
NEUTS SEG # BLD: 7.4 K/UL (ref 1.7–8.2)
NEUTS SEG NFR BLD: 78 % (ref 43–78)
NRBC # BLD: 0 K/UL (ref 0–0.2)
PLATELET # BLD AUTO: 369 K/UL (ref 150–450)
PMV BLD AUTO: 10.3 FL (ref 9.4–12.3)
POTASSIUM SERPL-SCNC: 4.2 MMOL/L (ref 3.5–5.1)
PROT SERPL-MCNC: 6.1 G/DL (ref 6.3–8.2)
RBC # BLD AUTO: 4.28 M/UL (ref 4.05–5.2)
SODIUM SERPL-SCNC: 131 MMOL/L (ref 133–143)
WBC # BLD AUTO: 9.6 K/UL (ref 4.3–11.1)

## 2023-06-18 PROCEDURE — 97165 OT EVAL LOW COMPLEX 30 MIN: CPT

## 2023-06-18 PROCEDURE — 36415 COLL VENOUS BLD VENIPUNCTURE: CPT

## 2023-06-18 PROCEDURE — 6370000000 HC RX 637 (ALT 250 FOR IP): Performed by: FAMILY MEDICINE

## 2023-06-18 PROCEDURE — 97535 SELF CARE MNGMENT TRAINING: CPT

## 2023-06-18 PROCEDURE — 6360000002 HC RX W HCPCS: Performed by: FAMILY MEDICINE

## 2023-06-18 PROCEDURE — 80053 COMPREHEN METABOLIC PANEL: CPT

## 2023-06-18 PROCEDURE — 97162 PT EVAL MOD COMPLEX 30 MIN: CPT

## 2023-06-18 PROCEDURE — 85025 COMPLETE CBC W/AUTO DIFF WBC: CPT

## 2023-06-18 PROCEDURE — 97530 THERAPEUTIC ACTIVITIES: CPT

## 2023-06-18 PROCEDURE — 1100000003 HC PRIVATE W/ TELEMETRY

## 2023-06-18 PROCEDURE — 99221 1ST HOSP IP/OBS SF/LOW 40: CPT | Performed by: UROLOGY

## 2023-06-18 PROCEDURE — 2580000003 HC RX 258: Performed by: FAMILY MEDICINE

## 2023-06-18 PROCEDURE — 2500000003 HC RX 250 WO HCPCS: Performed by: FAMILY MEDICINE

## 2023-06-18 RX ORDER — GABAPENTIN 400 MG/1
800 CAPSULE ORAL NIGHTLY
Status: DISCONTINUED | OUTPATIENT
Start: 2023-06-18 | End: 2023-06-25 | Stop reason: HOSPADM

## 2023-06-18 RX ADMIN — HYDROCODONE BITARTRATE AND ACETAMINOPHEN 1 TABLET: 10; 325 TABLET ORAL at 05:59

## 2023-06-18 RX ADMIN — GUAIFENESIN 1200 MG: 600 TABLET ORAL at 09:37

## 2023-06-18 RX ADMIN — TUBERCULIN PURIFIED PROTEIN DERIVATIVE 5 UNITS: 5 INJECTION, SOLUTION INTRADERMAL at 06:00

## 2023-06-18 RX ADMIN — AMIODARONE HYDROCHLORIDE 200 MG: 200 TABLET ORAL at 09:26

## 2023-06-18 RX ADMIN — FERROUS SULFATE TAB 325 MG (65 MG ELEMENTAL FE) 325 MG: 325 (65 FE) TAB at 09:29

## 2023-06-18 RX ADMIN — VANCOMYCIN HYDROCHLORIDE 1000 MG: 1 INJECTION, POWDER, LYOPHILIZED, FOR SOLUTION INTRAVENOUS at 22:39

## 2023-06-18 RX ADMIN — LEVOTHYROXINE SODIUM 50 MCG: 0.05 TABLET ORAL at 05:59

## 2023-06-18 RX ADMIN — POTASSIUM CHLORIDE 10 MEQ: 750 TABLET, EXTENDED RELEASE ORAL at 09:30

## 2023-06-18 RX ADMIN — SUCRALFATE 1 G: 1 TABLET ORAL at 00:36

## 2023-06-18 RX ADMIN — SUCRALFATE 1 G: 1 TABLET ORAL at 05:59

## 2023-06-18 RX ADMIN — HYDROCODONE BITARTRATE AND ACETAMINOPHEN 1 TABLET: 10; 325 TABLET ORAL at 15:21

## 2023-06-18 RX ADMIN — CEFTRIAXONE 1000 MG: 1 INJECTION, POWDER, FOR SOLUTION INTRAMUSCULAR; INTRAVENOUS at 21:37

## 2023-06-18 RX ADMIN — MIRTAZAPINE 15 MG: 15 TABLET, FILM COATED ORAL at 00:36

## 2023-06-18 RX ADMIN — MIRTAZAPINE 15 MG: 15 TABLET, FILM COATED ORAL at 21:43

## 2023-06-18 RX ADMIN — CARVEDILOL 6.25 MG: 3.12 TABLET, FILM COATED ORAL at 09:28

## 2023-06-18 RX ADMIN — PANTOPRAZOLE SODIUM 40 MG: 40 TABLET, DELAYED RELEASE ORAL at 05:59

## 2023-06-18 RX ADMIN — FENOFIBRATE 160 MG: 160 TABLET ORAL at 09:29

## 2023-06-18 RX ADMIN — LORAZEPAM 0.5 MG: 1 TABLET ORAL at 22:12

## 2023-06-18 RX ADMIN — DULOXETINE HYDROCHLORIDE 60 MG: 60 CAPSULE, DELAYED RELEASE ORAL at 09:28

## 2023-06-18 RX ADMIN — SUCRALFATE 1 G: 1 TABLET ORAL at 12:04

## 2023-06-18 RX ADMIN — MAGNESIUM GLUCONATE 500 MG ORAL TABLET 400 MG: 500 TABLET ORAL at 09:29

## 2023-06-18 RX ADMIN — ALUMINUM HYDROXIDE, MAGNESIUM HYDROXIDE, AND SIMETHICONE 30 ML: 200; 200; 20 SUSPENSION ORAL at 22:12

## 2023-06-18 RX ADMIN — LORAZEPAM 0.5 MG: 1 TABLET ORAL at 10:40

## 2023-06-18 RX ADMIN — GABAPENTIN 400 MG: 400 CAPSULE ORAL at 09:28

## 2023-06-18 RX ADMIN — GUAIFENESIN 1200 MG: 600 TABLET ORAL at 00:36

## 2023-06-18 RX ADMIN — CALCIUM 500 MG: 500 TABLET ORAL at 09:30

## 2023-06-18 RX ADMIN — GABAPENTIN 400 MG: 400 CAPSULE ORAL at 15:20

## 2023-06-18 RX ADMIN — GABAPENTIN 800 MG: 400 CAPSULE ORAL at 21:37

## 2023-06-18 RX ADMIN — SODIUM CHLORIDE, PRESERVATIVE FREE 10 ML: 5 INJECTION INTRAVENOUS at 21:42

## 2023-06-18 RX ADMIN — HYDROXYZINE HYDROCHLORIDE 25 MG: 25 TABLET, FILM COATED ORAL at 12:02

## 2023-06-18 RX ADMIN — CARVEDILOL 6.25 MG: 3.12 TABLET, FILM COATED ORAL at 17:05

## 2023-06-18 RX ADMIN — GUAIFENESIN 1200 MG: 600 TABLET ORAL at 21:40

## 2023-06-18 RX ADMIN — SUCRALFATE 1 G: 1 TABLET ORAL at 17:05

## 2023-06-18 RX ADMIN — SODIUM CHLORIDE, PRESERVATIVE FREE 10 ML: 5 INJECTION INTRAVENOUS at 09:30

## 2023-06-18 RX ADMIN — SUCRALFATE 1 G: 1 TABLET ORAL at 21:43

## 2023-06-18 RX ADMIN — ALUMINUM HYDROXIDE, MAGNESIUM HYDROXIDE, AND SIMETHICONE 30 ML: 200; 200; 20 SUSPENSION ORAL at 00:35

## 2023-06-18 ASSESSMENT — PAIN - FUNCTIONAL ASSESSMENT: PAIN_FUNCTIONAL_ASSESSMENT: PREVENTS OR INTERFERES SOME ACTIVE ACTIVITIES AND ADLS

## 2023-06-18 ASSESSMENT — ENCOUNTER SYMPTOMS
GASTROINTESTINAL NEGATIVE: 1
RESPIRATORY NEGATIVE: 1

## 2023-06-18 ASSESSMENT — PAIN SCALES - GENERAL
PAINLEVEL_OUTOF10: 0
PAINLEVEL_OUTOF10: 1
PAINLEVEL_OUTOF10: 7

## 2023-06-18 ASSESSMENT — PAIN DESCRIPTION - ORIENTATION: ORIENTATION: ANTERIOR;UPPER

## 2023-06-18 ASSESSMENT — PAIN DESCRIPTION - LOCATION
LOCATION: ABDOMEN

## 2023-06-18 ASSESSMENT — PAIN DESCRIPTION - DESCRIPTORS
DESCRIPTORS: ACHING
DESCRIPTORS: ACHING

## 2023-06-19 LAB
ALBUMIN SERPL-MCNC: 3.2 G/DL (ref 3.2–4.6)
ALBUMIN/GLOB SERPL: 1.2 (ref 0.4–1.6)
ALP SERPL-CCNC: 65 U/L (ref 50–136)
ALT SERPL-CCNC: 152 U/L (ref 12–65)
ANION GAP SERPL CALC-SCNC: 8 MMOL/L (ref 2–11)
AST SERPL-CCNC: 223 U/L (ref 15–37)
BILIRUB DIRECT SERPL-MCNC: 0.3 MG/DL
BILIRUB SERPL-MCNC: 0.7 MG/DL (ref 0.2–1.1)
BUN SERPL-MCNC: 15 MG/DL (ref 8–23)
CALCIUM SERPL-MCNC: 8.9 MG/DL (ref 8.3–10.4)
CHLORIDE SERPL-SCNC: 99 MMOL/L (ref 101–110)
CO2 SERPL-SCNC: 24 MMOL/L (ref 21–32)
CREAT SERPL-MCNC: 0.6 MG/DL (ref 0.6–1)
ERYTHROCYTE [DISTWIDTH] IN BLOOD BY AUTOMATED COUNT: 14.4 % (ref 11.9–14.6)
GLOBULIN SER CALC-MCNC: 2.7 G/DL (ref 2.8–4.5)
GLUCOSE SERPL-MCNC: 109 MG/DL (ref 65–100)
HCT VFR BLD AUTO: 38.7 % (ref 35.8–46.3)
HGB BLD-MCNC: 12.7 G/DL (ref 11.7–15.4)
MCH RBC QN AUTO: 29.8 PG (ref 26.1–32.9)
MCHC RBC AUTO-ENTMCNC: 32.8 G/DL (ref 31.4–35)
MCV RBC AUTO: 90.8 FL (ref 82–102)
MM INDURATION, POC: 0 MM (ref 0–5)
MRSA DNA SPEC QL NAA+PROBE: NOT DETECTED
NRBC # BLD: 0 K/UL (ref 0–0.2)
PLATELET # BLD AUTO: 365 K/UL (ref 150–450)
PMV BLD AUTO: 10.6 FL (ref 9.4–12.3)
POTASSIUM SERPL-SCNC: 4.3 MMOL/L (ref 3.5–5.1)
PPD, POC: NEGATIVE
PROT SERPL-MCNC: 5.9 G/DL (ref 6.3–8.2)
RBC # BLD AUTO: 4.26 M/UL (ref 4.05–5.2)
S AUREUS CPE NOSE QL NAA+PROBE: DETECTED
SODIUM SERPL-SCNC: 131 MMOL/L (ref 133–143)
VANCOMYCIN SERPL-MCNC: 11.6 UG/ML
WBC # BLD AUTO: 9.3 K/UL (ref 4.3–11.1)

## 2023-06-19 PROCEDURE — 6370000000 HC RX 637 (ALT 250 FOR IP): Performed by: FAMILY MEDICINE

## 2023-06-19 PROCEDURE — 92610 EVALUATE SWALLOWING FUNCTION: CPT

## 2023-06-19 PROCEDURE — 6370000000 HC RX 637 (ALT 250 FOR IP): Performed by: NURSE PRACTITIONER

## 2023-06-19 PROCEDURE — 6360000002 HC RX W HCPCS: Performed by: FAMILY MEDICINE

## 2023-06-19 PROCEDURE — 2580000003 HC RX 258: Performed by: FAMILY MEDICINE

## 2023-06-19 PROCEDURE — 80202 ASSAY OF VANCOMYCIN: CPT

## 2023-06-19 PROCEDURE — 36415 COLL VENOUS BLD VENIPUNCTURE: CPT

## 2023-06-19 PROCEDURE — 80076 HEPATIC FUNCTION PANEL: CPT

## 2023-06-19 PROCEDURE — 80048 BASIC METABOLIC PNL TOTAL CA: CPT

## 2023-06-19 PROCEDURE — 1100000000 HC RM PRIVATE

## 2023-06-19 PROCEDURE — 85027 COMPLETE CBC AUTOMATED: CPT

## 2023-06-19 PROCEDURE — 87641 MR-STAPH DNA AMP PROBE: CPT

## 2023-06-19 RX ADMIN — VANCOMYCIN HYDROCHLORIDE 1000 MG: 1 INJECTION, POWDER, LYOPHILIZED, FOR SOLUTION INTRAVENOUS at 22:25

## 2023-06-19 RX ADMIN — MAGNESIUM HYDROXIDE 30 ML: 400 SUSPENSION ORAL at 16:27

## 2023-06-19 RX ADMIN — LORAZEPAM 0.5 MG: 1 TABLET ORAL at 16:27

## 2023-06-19 RX ADMIN — CARVEDILOL 6.25 MG: 3.12 TABLET, FILM COATED ORAL at 16:27

## 2023-06-19 RX ADMIN — SODIUM CHLORIDE, PRESERVATIVE FREE 10 ML: 5 INJECTION INTRAVENOUS at 21:32

## 2023-06-19 RX ADMIN — LEVOTHYROXINE SODIUM 50 MCG: 0.05 TABLET ORAL at 05:31

## 2023-06-19 RX ADMIN — HYDROXYZINE HYDROCHLORIDE 25 MG: 25 TABLET, FILM COATED ORAL at 05:31

## 2023-06-19 RX ADMIN — SODIUM CHLORIDE, PRESERVATIVE FREE 10 ML: 5 INJECTION INTRAVENOUS at 09:40

## 2023-06-19 RX ADMIN — FENOFIBRATE 160 MG: 160 TABLET ORAL at 09:39

## 2023-06-19 RX ADMIN — DULOXETINE HYDROCHLORIDE 60 MG: 60 CAPSULE, DELAYED RELEASE ORAL at 09:40

## 2023-06-19 RX ADMIN — CARVEDILOL 6.25 MG: 3.12 TABLET, FILM COATED ORAL at 09:45

## 2023-06-19 RX ADMIN — CEFTRIAXONE 1000 MG: 1 INJECTION, POWDER, FOR SOLUTION INTRAMUSCULAR; INTRAVENOUS at 21:27

## 2023-06-19 RX ADMIN — AMIODARONE HYDROCHLORIDE 200 MG: 200 TABLET ORAL at 09:39

## 2023-06-19 RX ADMIN — GABAPENTIN 400 MG: 400 CAPSULE ORAL at 09:40

## 2023-06-19 RX ADMIN — MAGNESIUM GLUCONATE 500 MG ORAL TABLET 400 MG: 500 TABLET ORAL at 21:33

## 2023-06-19 RX ADMIN — PANTOPRAZOLE SODIUM 40 MG: 40 TABLET, DELAYED RELEASE ORAL at 05:30

## 2023-06-19 RX ADMIN — SUCRALFATE 1 G: 1 TABLET ORAL at 16:27

## 2023-06-19 RX ADMIN — FERROUS SULFATE TAB 325 MG (65 MG ELEMENTAL FE) 325 MG: 325 (65 FE) TAB at 09:39

## 2023-06-19 RX ADMIN — GABAPENTIN 800 MG: 400 CAPSULE ORAL at 21:33

## 2023-06-19 RX ADMIN — SUCRALFATE 1 G: 1 TABLET ORAL at 21:33

## 2023-06-19 RX ADMIN — ACETAMINOPHEN 650 MG: 325 TABLET ORAL at 21:41

## 2023-06-19 RX ADMIN — GABAPENTIN 400 MG: 400 CAPSULE ORAL at 16:27

## 2023-06-19 RX ADMIN — SUCRALFATE 1 G: 1 TABLET ORAL at 05:31

## 2023-06-19 RX ADMIN — HYDROCODONE BITARTRATE AND ACETAMINOPHEN 1 TABLET: 10; 325 TABLET ORAL at 11:08

## 2023-06-19 RX ADMIN — MAGNESIUM GLUCONATE 500 MG ORAL TABLET 400 MG: 500 TABLET ORAL at 09:39

## 2023-06-19 RX ADMIN — HYDROCODONE BITARTRATE AND ACETAMINOPHEN 1 TABLET: 10; 325 TABLET ORAL at 19:14

## 2023-06-19 RX ADMIN — CALCIUM 500 MG: 500 TABLET ORAL at 09:39

## 2023-06-19 RX ADMIN — MIRTAZAPINE 15 MG: 15 TABLET, FILM COATED ORAL at 21:33

## 2023-06-19 RX ADMIN — SUCRALFATE 1 G: 1 TABLET ORAL at 09:39

## 2023-06-19 RX ADMIN — POTASSIUM CHLORIDE 10 MEQ: 750 TABLET, EXTENDED RELEASE ORAL at 09:39

## 2023-06-19 ASSESSMENT — PAIN DESCRIPTION - DESCRIPTORS
DESCRIPTORS: ACHING
DESCRIPTORS: ACHING;DISCOMFORT
DESCRIPTORS: ACHING

## 2023-06-19 ASSESSMENT — PAIN SCALES - GENERAL
PAINLEVEL_OUTOF10: 10
PAINLEVEL_OUTOF10: 1
PAINLEVEL_OUTOF10: 10
PAINLEVEL_OUTOF10: 3

## 2023-06-19 ASSESSMENT — PAIN DESCRIPTION - LOCATION
LOCATION: BACK

## 2023-06-19 ASSESSMENT — PAIN DESCRIPTION - ORIENTATION
ORIENTATION: LEFT
ORIENTATION: LOWER
ORIENTATION: LOWER

## 2023-06-19 ASSESSMENT — PAIN - FUNCTIONAL ASSESSMENT: PAIN_FUNCTIONAL_ASSESSMENT: ACTIVITIES ARE NOT PREVENTED

## 2023-06-20 ENCOUNTER — APPOINTMENT (OUTPATIENT)
Dept: ULTRASOUND IMAGING | Age: 88
DRG: 698 | End: 2023-06-20
Payer: MEDICARE

## 2023-06-20 LAB
ALBUMIN SERPL-MCNC: 2.6 G/DL (ref 3.2–4.6)
ALBUMIN/GLOB SERPL: 1 (ref 0.4–1.6)
ALP SERPL-CCNC: 57 U/L (ref 50–136)
ALT SERPL-CCNC: 136 U/L (ref 12–65)
ANION GAP SERPL CALC-SCNC: 7 MMOL/L (ref 2–11)
AST SERPL-CCNC: 161 U/L (ref 15–37)
BACTERIA SPEC CULT: ABNORMAL
BILIRUB SERPL-MCNC: 0.5 MG/DL (ref 0.2–1.1)
BUN SERPL-MCNC: 16 MG/DL (ref 8–23)
CALCIUM SERPL-MCNC: 8.5 MG/DL (ref 8.3–10.4)
CHLORIDE SERPL-SCNC: 99 MMOL/L (ref 101–110)
CO2 SERPL-SCNC: 25 MMOL/L (ref 21–32)
CREAT SERPL-MCNC: 0.6 MG/DL (ref 0.6–1)
EKG ATRIAL RATE: 109 BPM
EKG DIAGNOSIS: NORMAL
EKG P AXIS: 83 DEGREES
EKG Q-T INTERVAL: 414 MS
EKG QRS DURATION: 174 MS
EKG QTC CALCULATION (BAZETT): 506 MS
EKG R AXIS: -58 DEGREES
EKG T AXIS: 126 DEGREES
EKG VENTRICULAR RATE: 90 BPM
GLOBULIN SER CALC-MCNC: 2.5 G/DL (ref 2.8–4.5)
GLUCOSE SERPL-MCNC: 106 MG/DL (ref 65–100)
MM INDURATION, POC: 0 MM (ref 0–5)
POTASSIUM SERPL-SCNC: 4.6 MMOL/L (ref 3.5–5.1)
PPD, POC: NEGATIVE
PROT SERPL-MCNC: 5.1 G/DL (ref 6.3–8.2)
SARS-COV-2 RDRP RESP QL NAA+PROBE: NOT DETECTED
SERVICE CMNT-IMP: ABNORMAL
SODIUM SERPL-SCNC: 131 MMOL/L (ref 133–143)
SOURCE: NORMAL
VANCOMYCIN SERPL-MCNC: 13.7 UG/ML

## 2023-06-20 PROCEDURE — 2580000003 HC RX 258: Performed by: FAMILY MEDICINE

## 2023-06-20 PROCEDURE — 1100000000 HC RM PRIVATE

## 2023-06-20 PROCEDURE — 36415 COLL VENOUS BLD VENIPUNCTURE: CPT

## 2023-06-20 PROCEDURE — 93005 ELECTROCARDIOGRAM TRACING: CPT | Performed by: NURSE PRACTITIONER

## 2023-06-20 PROCEDURE — 6370000000 HC RX 637 (ALT 250 FOR IP): Performed by: FAMILY MEDICINE

## 2023-06-20 PROCEDURE — 87635 SARS-COV-2 COVID-19 AMP PRB: CPT

## 2023-06-20 PROCEDURE — 97530 THERAPEUTIC ACTIVITIES: CPT

## 2023-06-20 PROCEDURE — 80053 COMPREHEN METABOLIC PANEL: CPT

## 2023-06-20 PROCEDURE — 80202 ASSAY OF VANCOMYCIN: CPT

## 2023-06-20 PROCEDURE — 51702 INSERT TEMP BLADDER CATH: CPT

## 2023-06-20 PROCEDURE — 6370000000 HC RX 637 (ALT 250 FOR IP): Performed by: NURSE PRACTITIONER

## 2023-06-20 PROCEDURE — 76705 ECHO EXAM OF ABDOMEN: CPT

## 2023-06-20 PROCEDURE — 6360000002 HC RX W HCPCS: Performed by: FAMILY MEDICINE

## 2023-06-20 RX ORDER — LIDOCAINE 4 G/G
1 PATCH TOPICAL DAILY
Status: DISCONTINUED | OUTPATIENT
Start: 2023-06-20 | End: 2023-06-23

## 2023-06-20 RX ORDER — CIPROFLOXACIN 500 MG/1
500 TABLET, FILM COATED ORAL EVERY 12 HOURS SCHEDULED
Qty: 13 TABLET | Refills: 0 | Status: SHIPPED | OUTPATIENT
Start: 2023-06-20 | End: 2023-06-20 | Stop reason: HOSPADM

## 2023-06-20 RX ORDER — CIPROFLOXACIN 500 MG/1
500 TABLET, FILM COATED ORAL EVERY 12 HOURS SCHEDULED
Status: DISCONTINUED | OUTPATIENT
Start: 2023-06-20 | End: 2023-06-20

## 2023-06-20 RX ADMIN — SUCRALFATE 1 G: 1 TABLET ORAL at 11:59

## 2023-06-20 RX ADMIN — LEVOTHYROXINE SODIUM 50 MCG: 0.05 TABLET ORAL at 05:40

## 2023-06-20 RX ADMIN — GABAPENTIN 800 MG: 400 CAPSULE ORAL at 21:19

## 2023-06-20 RX ADMIN — CEFEPIME 2000 MG: 2 INJECTION, POWDER, FOR SOLUTION INTRAVENOUS at 11:59

## 2023-06-20 RX ADMIN — POTASSIUM CHLORIDE 10 MEQ: 750 TABLET, EXTENDED RELEASE ORAL at 10:02

## 2023-06-20 RX ADMIN — PANTOPRAZOLE SODIUM 40 MG: 40 TABLET, DELAYED RELEASE ORAL at 05:40

## 2023-06-20 RX ADMIN — GABAPENTIN 400 MG: 400 CAPSULE ORAL at 16:07

## 2023-06-20 RX ADMIN — ACETAMINOPHEN 650 MG: 325 TABLET ORAL at 22:34

## 2023-06-20 RX ADMIN — FERROUS SULFATE TAB 325 MG (65 MG ELEMENTAL FE) 325 MG: 325 (65 FE) TAB at 10:02

## 2023-06-20 RX ADMIN — HYDROCODONE BITARTRATE AND ACETAMINOPHEN 1 TABLET: 10; 325 TABLET ORAL at 19:31

## 2023-06-20 RX ADMIN — MIRTAZAPINE 15 MG: 15 TABLET, FILM COATED ORAL at 21:20

## 2023-06-20 RX ADMIN — FENOFIBRATE 160 MG: 160 TABLET ORAL at 10:02

## 2023-06-20 RX ADMIN — GABAPENTIN 400 MG: 400 CAPSULE ORAL at 10:02

## 2023-06-20 RX ADMIN — CALCIUM 500 MG: 500 TABLET ORAL at 10:02

## 2023-06-20 RX ADMIN — AMIODARONE HYDROCHLORIDE 200 MG: 200 TABLET ORAL at 10:02

## 2023-06-20 RX ADMIN — CARVEDILOL 6.25 MG: 3.12 TABLET, FILM COATED ORAL at 10:02

## 2023-06-20 RX ADMIN — HYDROXYZINE HYDROCHLORIDE 25 MG: 25 TABLET, FILM COATED ORAL at 16:03

## 2023-06-20 RX ADMIN — SUCRALFATE 1 G: 1 TABLET ORAL at 16:03

## 2023-06-20 RX ADMIN — MAGNESIUM GLUCONATE 500 MG ORAL TABLET 400 MG: 500 TABLET ORAL at 10:02

## 2023-06-20 RX ADMIN — LORAZEPAM 0.5 MG: 1 TABLET ORAL at 16:03

## 2023-06-20 RX ADMIN — SUCRALFATE 1 G: 1 TABLET ORAL at 21:19

## 2023-06-20 RX ADMIN — DULOXETINE HYDROCHLORIDE 60 MG: 60 CAPSULE, DELAYED RELEASE ORAL at 10:02

## 2023-06-20 RX ADMIN — CARVEDILOL 6.25 MG: 3.12 TABLET, FILM COATED ORAL at 16:04

## 2023-06-20 RX ADMIN — SODIUM CHLORIDE, PRESERVATIVE FREE 10 ML: 5 INJECTION INTRAVENOUS at 21:20

## 2023-06-20 RX ADMIN — MAGNESIUM GLUCONATE 500 MG ORAL TABLET 400 MG: 500 TABLET ORAL at 21:19

## 2023-06-20 RX ADMIN — SODIUM CHLORIDE, PRESERVATIVE FREE 10 ML: 5 INJECTION INTRAVENOUS at 10:03

## 2023-06-20 ASSESSMENT — PAIN SCALES - GENERAL
PAINLEVEL_OUTOF10: 3
PAINLEVEL_OUTOF10: 1
PAINLEVEL_OUTOF10: 9
PAINLEVEL_OUTOF10: 3
PAINLEVEL_OUTOF10: 0
PAINLEVEL_OUTOF10: 0

## 2023-06-20 ASSESSMENT — PAIN DESCRIPTION - DESCRIPTORS
DESCRIPTORS: ACHING
DESCRIPTORS: DISCOMFORT;ACHING

## 2023-06-20 ASSESSMENT — PAIN DESCRIPTION - ORIENTATION
ORIENTATION: LOWER;MID
ORIENTATION: MID

## 2023-06-20 ASSESSMENT — PAIN DESCRIPTION - LOCATION
LOCATION: BACK
LOCATION: BACK

## 2023-06-20 ASSESSMENT — PAIN - FUNCTIONAL ASSESSMENT: PAIN_FUNCTIONAL_ASSESSMENT: ACTIVITIES ARE NOT PREVENTED

## 2023-06-21 PROBLEM — M51.35 DJD (DEGENERATIVE JOINT DISEASE), THORACOLUMBAR: Status: ACTIVE | Noted: 2023-01-01

## 2023-06-21 LAB
ALBUMIN SERPL-MCNC: 2.7 G/DL (ref 3.2–4.6)
ALBUMIN/GLOB SERPL: 0.9 (ref 0.4–1.6)
ALP SERPL-CCNC: 59 U/L (ref 50–136)
ALT SERPL-CCNC: 150 U/L (ref 12–65)
ANION GAP SERPL CALC-SCNC: 6 MMOL/L (ref 2–11)
AST SERPL-CCNC: 183 U/L (ref 15–37)
BILIRUB SERPL-MCNC: 0.5 MG/DL (ref 0.2–1.1)
BUN SERPL-MCNC: 18 MG/DL (ref 8–23)
CALCIUM SERPL-MCNC: 8.6 MG/DL (ref 8.3–10.4)
CHLORIDE SERPL-SCNC: 98 MMOL/L (ref 101–110)
CHLORIDE UR-SCNC: 44 MMOL/L
CO2 SERPL-SCNC: 24 MMOL/L (ref 21–32)
CORTIS AM PEAK SERPL-MCNC: 31.7 UG/DL (ref 7–25)
CREAT SERPL-MCNC: 0.6 MG/DL (ref 0.6–1)
GLOBULIN SER CALC-MCNC: 2.9 G/DL (ref 2.8–4.5)
GLUCOSE SERPL-MCNC: 93 MG/DL (ref 65–100)
OSMOLALITY SERPL: 272 MOSM/KG H2O (ref 280–301)
OSMOLALITY UR: 506 MOSM/KG H2O (ref 50–1400)
POTASSIUM SERPL-SCNC: 5.1 MMOL/L (ref 3.5–5.1)
PROT SERPL-MCNC: 5.6 G/DL (ref 6.3–8.2)
SODIUM SERPL-SCNC: 128 MMOL/L (ref 133–143)
SODIUM UR-SCNC: 6 MMOL/L
TSH W FREE THYROID IF ABNORMAL: 1.66 UIU/ML (ref 0.36–3.74)

## 2023-06-21 PROCEDURE — 82533 TOTAL CORTISOL: CPT

## 2023-06-21 PROCEDURE — 2580000003 HC RX 258: Performed by: FAMILY MEDICINE

## 2023-06-21 PROCEDURE — 6370000000 HC RX 637 (ALT 250 FOR IP): Performed by: FAMILY MEDICINE

## 2023-06-21 PROCEDURE — 83930 ASSAY OF BLOOD OSMOLALITY: CPT

## 2023-06-21 PROCEDURE — 51702 INSERT TEMP BLADDER CATH: CPT

## 2023-06-21 PROCEDURE — 83935 ASSAY OF URINE OSMOLALITY: CPT

## 2023-06-21 PROCEDURE — 84443 ASSAY THYROID STIM HORMONE: CPT

## 2023-06-21 PROCEDURE — 36415 COLL VENOUS BLD VENIPUNCTURE: CPT

## 2023-06-21 PROCEDURE — 6370000000 HC RX 637 (ALT 250 FOR IP): Performed by: NURSE PRACTITIONER

## 2023-06-21 PROCEDURE — 97112 NEUROMUSCULAR REEDUCATION: CPT

## 2023-06-21 PROCEDURE — 80053 COMPREHEN METABOLIC PANEL: CPT

## 2023-06-21 PROCEDURE — 1100000000 HC RM PRIVATE

## 2023-06-21 PROCEDURE — 97530 THERAPEUTIC ACTIVITIES: CPT

## 2023-06-21 PROCEDURE — 84300 ASSAY OF URINE SODIUM: CPT

## 2023-06-21 PROCEDURE — 6360000002 HC RX W HCPCS: Performed by: FAMILY MEDICINE

## 2023-06-21 PROCEDURE — 82436 ASSAY OF URINE CHLORIDE: CPT

## 2023-06-21 RX ADMIN — SODIUM CHLORIDE, PRESERVATIVE FREE 10 ML: 5 INJECTION INTRAVENOUS at 08:25

## 2023-06-21 RX ADMIN — DULOXETINE HYDROCHLORIDE 60 MG: 60 CAPSULE, DELAYED RELEASE ORAL at 08:24

## 2023-06-21 RX ADMIN — CARVEDILOL 6.25 MG: 3.12 TABLET, FILM COATED ORAL at 17:21

## 2023-06-21 RX ADMIN — SUCRALFATE 1 G: 1 TABLET ORAL at 06:12

## 2023-06-21 RX ADMIN — HYDROXYZINE HYDROCHLORIDE 25 MG: 25 TABLET, FILM COATED ORAL at 02:00

## 2023-06-21 RX ADMIN — FENOFIBRATE 160 MG: 160 TABLET ORAL at 08:24

## 2023-06-21 RX ADMIN — SODIUM CHLORIDE, PRESERVATIVE FREE 10 ML: 5 INJECTION INTRAVENOUS at 21:11

## 2023-06-21 RX ADMIN — CEFEPIME 2000 MG: 2 INJECTION, POWDER, FOR SOLUTION INTRAVENOUS at 12:20

## 2023-06-21 RX ADMIN — CALCIUM 500 MG: 500 TABLET ORAL at 08:24

## 2023-06-21 RX ADMIN — LORAZEPAM 0.5 MG: 1 TABLET ORAL at 21:10

## 2023-06-21 RX ADMIN — MIRTAZAPINE 15 MG: 15 TABLET, FILM COATED ORAL at 21:10

## 2023-06-21 RX ADMIN — CARVEDILOL 6.25 MG: 3.12 TABLET, FILM COATED ORAL at 08:23

## 2023-06-21 RX ADMIN — HYDROCODONE BITARTRATE AND ACETAMINOPHEN 1 TABLET: 10; 325 TABLET ORAL at 08:30

## 2023-06-21 RX ADMIN — LORAZEPAM 0.5 MG: 1 TABLET ORAL at 08:23

## 2023-06-21 RX ADMIN — SUCRALFATE 1 G: 1 TABLET ORAL at 12:20

## 2023-06-21 RX ADMIN — SUCRALFATE 1 G: 1 TABLET ORAL at 21:10

## 2023-06-21 RX ADMIN — MAGNESIUM GLUCONATE 500 MG ORAL TABLET 400 MG: 500 TABLET ORAL at 21:10

## 2023-06-21 RX ADMIN — FERROUS SULFATE TAB 325 MG (65 MG ELEMENTAL FE) 325 MG: 325 (65 FE) TAB at 08:24

## 2023-06-21 RX ADMIN — SUCRALFATE 1 G: 1 TABLET ORAL at 17:20

## 2023-06-21 RX ADMIN — GABAPENTIN 400 MG: 400 CAPSULE ORAL at 17:20

## 2023-06-21 RX ADMIN — HYDROCODONE BITARTRATE AND ACETAMINOPHEN 1 TABLET: 10; 325 TABLET ORAL at 17:26

## 2023-06-21 RX ADMIN — HYDROXYZINE HYDROCHLORIDE 25 MG: 25 TABLET, FILM COATED ORAL at 21:10

## 2023-06-21 RX ADMIN — LEVOTHYROXINE SODIUM 50 MCG: 0.05 TABLET ORAL at 05:09

## 2023-06-21 RX ADMIN — PANTOPRAZOLE SODIUM 40 MG: 40 TABLET, DELAYED RELEASE ORAL at 05:09

## 2023-06-21 RX ADMIN — GABAPENTIN 400 MG: 400 CAPSULE ORAL at 08:24

## 2023-06-21 RX ADMIN — MAGNESIUM GLUCONATE 500 MG ORAL TABLET 400 MG: 500 TABLET ORAL at 08:24

## 2023-06-21 RX ADMIN — HYDROCODONE BITARTRATE AND ACETAMINOPHEN 1 TABLET: 10; 325 TABLET ORAL at 02:00

## 2023-06-21 RX ADMIN — GABAPENTIN 800 MG: 400 CAPSULE ORAL at 21:10

## 2023-06-21 RX ADMIN — AMIODARONE HYDROCHLORIDE 200 MG: 200 TABLET ORAL at 08:24

## 2023-06-21 ASSESSMENT — PAIN DESCRIPTION - ORIENTATION
ORIENTATION: LEFT
ORIENTATION: MID;LOWER

## 2023-06-21 ASSESSMENT — PAIN SCALES - GENERAL
PAINLEVEL_OUTOF10: 2
PAINLEVEL_OUTOF10: 0
PAINLEVEL_OUTOF10: 8
PAINLEVEL_OUTOF10: 7
PAINLEVEL_OUTOF10: 4
PAINLEVEL_OUTOF10: 0
PAINLEVEL_OUTOF10: 1
PAINLEVEL_OUTOF10: 0
PAINLEVEL_OUTOF10: 5

## 2023-06-21 ASSESSMENT — PAIN DESCRIPTION - LOCATION
LOCATION: KNEE
LOCATION: BACK
LOCATION: BACK

## 2023-06-21 ASSESSMENT — PAIN - FUNCTIONAL ASSESSMENT: PAIN_FUNCTIONAL_ASSESSMENT: ACTIVITIES ARE NOT PREVENTED

## 2023-06-21 ASSESSMENT — PAIN DESCRIPTION - DESCRIPTORS
DESCRIPTORS: ACHING;DISCOMFORT

## 2023-06-22 PROBLEM — F41.1 GENERALIZED ANXIETY DISORDER: Status: ACTIVE | Noted: 2023-01-01

## 2023-06-22 PROBLEM — I50.22 CHRONIC SYSTOLIC CONGESTIVE HEART FAILURE (HCC): Status: RESOLVED | Noted: 2018-03-29 | Resolved: 2023-01-01

## 2023-06-22 LAB
ANION GAP SERPL CALC-SCNC: 5 MMOL/L (ref 2–11)
BACTERIA SPEC CULT: NORMAL
BACTERIA SPEC CULT: NORMAL
BUN SERPL-MCNC: 16 MG/DL (ref 8–23)
CALCIUM SERPL-MCNC: 9.1 MG/DL (ref 8.3–10.4)
CHLORIDE SERPL-SCNC: 97 MMOL/L (ref 101–110)
CO2 SERPL-SCNC: 27 MMOL/L (ref 21–32)
CREAT SERPL-MCNC: 0.6 MG/DL (ref 0.6–1)
ERYTHROCYTE [DISTWIDTH] IN BLOOD BY AUTOMATED COUNT: 14.4 % (ref 11.9–14.6)
GLUCOSE SERPL-MCNC: 100 MG/DL (ref 65–100)
HCT VFR BLD AUTO: 39.7 % (ref 35.8–46.3)
HGB BLD-MCNC: 13.1 G/DL (ref 11.7–15.4)
MCH RBC QN AUTO: 29.6 PG (ref 26.1–32.9)
MCHC RBC AUTO-ENTMCNC: 33 G/DL (ref 31.4–35)
MCV RBC AUTO: 89.8 FL (ref 82–102)
MM INDURATION, POC: 0 MM (ref 0–5)
NRBC # BLD: 0 K/UL (ref 0–0.2)
PLATELET # BLD AUTO: 379 K/UL (ref 150–450)
PMV BLD AUTO: 9.7 FL (ref 9.4–12.3)
POTASSIUM SERPL-SCNC: 4.8 MMOL/L (ref 3.5–5.1)
PPD, POC: NEGATIVE
RBC # BLD AUTO: 4.42 M/UL (ref 4.05–5.2)
SERVICE CMNT-IMP: NORMAL
SERVICE CMNT-IMP: NORMAL
SODIUM SERPL-SCNC: 128 MMOL/L (ref 133–143)
SODIUM SERPL-SCNC: 129 MMOL/L (ref 133–143)
WBC # BLD AUTO: 8.7 K/UL (ref 4.3–11.1)

## 2023-06-22 PROCEDURE — 36415 COLL VENOUS BLD VENIPUNCTURE: CPT

## 2023-06-22 PROCEDURE — 85027 COMPLETE CBC AUTOMATED: CPT

## 2023-06-22 PROCEDURE — 6370000000 HC RX 637 (ALT 250 FOR IP): Performed by: PHYSICIAN ASSISTANT

## 2023-06-22 PROCEDURE — 6360000002 HC RX W HCPCS: Performed by: FAMILY MEDICINE

## 2023-06-22 PROCEDURE — 6370000000 HC RX 637 (ALT 250 FOR IP): Performed by: NURSE PRACTITIONER

## 2023-06-22 PROCEDURE — 51702 INSERT TEMP BLADDER CATH: CPT

## 2023-06-22 PROCEDURE — 2580000003 HC RX 258: Performed by: FAMILY MEDICINE

## 2023-06-22 PROCEDURE — 92526 ORAL FUNCTION THERAPY: CPT

## 2023-06-22 PROCEDURE — 80048 BASIC METABOLIC PNL TOTAL CA: CPT

## 2023-06-22 PROCEDURE — 84295 ASSAY OF SERUM SODIUM: CPT

## 2023-06-22 PROCEDURE — 2580000003 HC RX 258: Performed by: PHYSICIAN ASSISTANT

## 2023-06-22 PROCEDURE — 1100000000 HC RM PRIVATE

## 2023-06-22 PROCEDURE — 6370000000 HC RX 637 (ALT 250 FOR IP): Performed by: FAMILY MEDICINE

## 2023-06-22 RX ORDER — SODIUM CHLORIDE 9 MG/ML
INJECTION, SOLUTION INTRAVENOUS CONTINUOUS
Status: ACTIVE | OUTPATIENT
Start: 2023-06-22 | End: 2023-06-23

## 2023-06-22 RX ORDER — LORAZEPAM 0.5 MG/1
0.5 TABLET ORAL EVERY 8 HOURS PRN
Status: DISCONTINUED | OUTPATIENT
Start: 2023-06-22 | End: 2023-06-25 | Stop reason: HOSPADM

## 2023-06-22 RX ORDER — LANOLIN ALCOHOL/MO/W.PET/CERES
3 CREAM (GRAM) TOPICAL NIGHTLY PRN
Status: DISCONTINUED | OUTPATIENT
Start: 2023-06-22 | End: 2023-06-25 | Stop reason: HOSPADM

## 2023-06-22 RX ORDER — SODIUM CHLORIDE 9 MG/ML
INJECTION, SOLUTION INTRAVENOUS CONTINUOUS
Status: DISCONTINUED | OUTPATIENT
Start: 2023-06-22 | End: 2023-06-22

## 2023-06-22 RX ADMIN — CALCIUM 500 MG: 500 TABLET ORAL at 08:48

## 2023-06-22 RX ADMIN — HYDROXYZINE HYDROCHLORIDE 25 MG: 25 TABLET, FILM COATED ORAL at 11:20

## 2023-06-22 RX ADMIN — SUCRALFATE 1 G: 1 TABLET ORAL at 06:01

## 2023-06-22 RX ADMIN — SODIUM CHLORIDE: 9 INJECTION, SOLUTION INTRAVENOUS at 16:05

## 2023-06-22 RX ADMIN — CARVEDILOL 6.25 MG: 3.12 TABLET, FILM COATED ORAL at 08:47

## 2023-06-22 RX ADMIN — SODIUM CHLORIDE: 9 INJECTION, SOLUTION INTRAVENOUS at 08:45

## 2023-06-22 RX ADMIN — MAGNESIUM GLUCONATE 500 MG ORAL TABLET 400 MG: 500 TABLET ORAL at 08:47

## 2023-06-22 RX ADMIN — FERROUS SULFATE TAB 325 MG (65 MG ELEMENTAL FE) 325 MG: 325 (65 FE) TAB at 08:48

## 2023-06-22 RX ADMIN — GABAPENTIN 800 MG: 400 CAPSULE ORAL at 21:30

## 2023-06-22 RX ADMIN — SODIUM CHLORIDE, PRESERVATIVE FREE 10 ML: 5 INJECTION INTRAVENOUS at 21:30

## 2023-06-22 RX ADMIN — CARVEDILOL 6.25 MG: 3.12 TABLET, FILM COATED ORAL at 16:06

## 2023-06-22 RX ADMIN — SODIUM CHLORIDE, PRESERVATIVE FREE 10 ML: 5 INJECTION INTRAVENOUS at 08:48

## 2023-06-22 RX ADMIN — MAGNESIUM GLUCONATE 500 MG ORAL TABLET 400 MG: 500 TABLET ORAL at 21:30

## 2023-06-22 RX ADMIN — HYDROCODONE BITARTRATE AND ACETAMINOPHEN 1 TABLET: 10; 325 TABLET ORAL at 12:15

## 2023-06-22 RX ADMIN — HYDROXYZINE HYDROCHLORIDE 25 MG: 25 TABLET, FILM COATED ORAL at 21:30

## 2023-06-22 RX ADMIN — SUCRALFATE 1 G: 1 TABLET ORAL at 21:30

## 2023-06-22 RX ADMIN — LORAZEPAM 0.5 MG: 0.5 TABLET ORAL at 16:10

## 2023-06-22 RX ADMIN — MIRTAZAPINE 15 MG: 15 TABLET, FILM COATED ORAL at 21:30

## 2023-06-22 RX ADMIN — CEFEPIME 2000 MG: 2 INJECTION, POWDER, FOR SOLUTION INTRAVENOUS at 11:47

## 2023-06-22 RX ADMIN — PANTOPRAZOLE SODIUM 40 MG: 40 TABLET, DELAYED RELEASE ORAL at 05:08

## 2023-06-22 RX ADMIN — SUCRALFATE 1 G: 1 TABLET ORAL at 11:20

## 2023-06-22 RX ADMIN — DULOXETINE HYDROCHLORIDE 60 MG: 60 CAPSULE, DELAYED RELEASE ORAL at 08:48

## 2023-06-22 RX ADMIN — ALUMINUM HYDROXIDE, MAGNESIUM HYDROXIDE, AND SIMETHICONE 30 ML: 200; 200; 20 SUSPENSION ORAL at 16:17

## 2023-06-22 RX ADMIN — AMIODARONE HYDROCHLORIDE 200 MG: 200 TABLET ORAL at 08:48

## 2023-06-22 RX ADMIN — SUCRALFATE 1 G: 1 TABLET ORAL at 16:07

## 2023-06-22 RX ADMIN — FENOFIBRATE 160 MG: 160 TABLET ORAL at 08:48

## 2023-06-22 RX ADMIN — LORAZEPAM 0.5 MG: 1 TABLET ORAL at 08:47

## 2023-06-22 RX ADMIN — LEVOTHYROXINE SODIUM 50 MCG: 0.05 TABLET ORAL at 05:08

## 2023-06-22 RX ADMIN — GABAPENTIN 400 MG: 400 CAPSULE ORAL at 08:47

## 2023-06-22 RX ADMIN — GABAPENTIN 400 MG: 400 CAPSULE ORAL at 16:06

## 2023-06-22 ASSESSMENT — PAIN SCALES - GENERAL
PAINLEVEL_OUTOF10: 0
PAINLEVEL_OUTOF10: 6

## 2023-06-22 ASSESSMENT — PAIN DESCRIPTION - LOCATION: LOCATION: BACK

## 2023-06-23 LAB
ANION GAP SERPL CALC-SCNC: 8 MMOL/L (ref 2–11)
BUN SERPL-MCNC: 15 MG/DL (ref 8–23)
CALCIUM SERPL-MCNC: 8.9 MG/DL (ref 8.3–10.4)
CHLORIDE SERPL-SCNC: 99 MMOL/L (ref 101–110)
CO2 SERPL-SCNC: 22 MMOL/L (ref 21–32)
CREAT SERPL-MCNC: 0.5 MG/DL (ref 0.6–1)
ERYTHROCYTE [DISTWIDTH] IN BLOOD BY AUTOMATED COUNT: 14.6 % (ref 11.9–14.6)
GLUCOSE SERPL-MCNC: 111 MG/DL (ref 65–100)
HCT VFR BLD AUTO: 40.1 % (ref 35.8–46.3)
HGB BLD-MCNC: 13.2 G/DL (ref 11.7–15.4)
MCH RBC QN AUTO: 29.7 PG (ref 26.1–32.9)
MCHC RBC AUTO-ENTMCNC: 32.9 G/DL (ref 31.4–35)
MCV RBC AUTO: 90.1 FL (ref 82–102)
NRBC # BLD: 0 K/UL (ref 0–0.2)
PLATELET # BLD AUTO: 357 K/UL (ref 150–450)
PMV BLD AUTO: 10.1 FL (ref 9.4–12.3)
POTASSIUM SERPL-SCNC: 4.7 MMOL/L (ref 3.5–5.1)
RBC # BLD AUTO: 4.45 M/UL (ref 4.05–5.2)
SODIUM SERPL-SCNC: 129 MMOL/L (ref 133–143)
WBC # BLD AUTO: 9.6 K/UL (ref 4.3–11.1)

## 2023-06-23 PROCEDURE — 80048 BASIC METABOLIC PNL TOTAL CA: CPT

## 2023-06-23 PROCEDURE — 1100000000 HC RM PRIVATE

## 2023-06-23 PROCEDURE — 2580000003 HC RX 258: Performed by: FAMILY MEDICINE

## 2023-06-23 PROCEDURE — 97530 THERAPEUTIC ACTIVITIES: CPT

## 2023-06-23 PROCEDURE — 36415 COLL VENOUS BLD VENIPUNCTURE: CPT

## 2023-06-23 PROCEDURE — 51702 INSERT TEMP BLADDER CATH: CPT

## 2023-06-23 PROCEDURE — 6370000000 HC RX 637 (ALT 250 FOR IP): Performed by: PHYSICIAN ASSISTANT

## 2023-06-23 PROCEDURE — 6370000000 HC RX 637 (ALT 250 FOR IP): Performed by: NURSE PRACTITIONER

## 2023-06-23 PROCEDURE — 85027 COMPLETE CBC AUTOMATED: CPT

## 2023-06-23 PROCEDURE — 6360000002 HC RX W HCPCS: Performed by: NURSE PRACTITIONER

## 2023-06-23 PROCEDURE — 6360000002 HC RX W HCPCS: Performed by: FAMILY MEDICINE

## 2023-06-23 PROCEDURE — 6370000000 HC RX 637 (ALT 250 FOR IP): Performed by: FAMILY MEDICINE

## 2023-06-23 RX ORDER — ONDANSETRON 2 MG/ML
4 INJECTION INTRAMUSCULAR; INTRAVENOUS EVERY 6 HOURS PRN
Status: DISCONTINUED | OUTPATIENT
Start: 2023-06-23 | End: 2023-06-25 | Stop reason: HOSPADM

## 2023-06-23 RX ORDER — LIDOCAINE 4 G/G
2 PATCH TOPICAL DAILY
Status: DISCONTINUED | OUTPATIENT
Start: 2023-06-23 | End: 2023-06-25 | Stop reason: HOSPADM

## 2023-06-23 RX ADMIN — HYDROCODONE BITARTRATE AND ACETAMINOPHEN 1 TABLET: 10; 325 TABLET ORAL at 12:10

## 2023-06-23 RX ADMIN — MAGNESIUM GLUCONATE 500 MG ORAL TABLET 400 MG: 500 TABLET ORAL at 08:59

## 2023-06-23 RX ADMIN — CARVEDILOL 6.25 MG: 3.12 TABLET, FILM COATED ORAL at 08:59

## 2023-06-23 RX ADMIN — SUCRALFATE 1 G: 1 TABLET ORAL at 20:49

## 2023-06-23 RX ADMIN — MIRTAZAPINE 15 MG: 15 TABLET, FILM COATED ORAL at 20:49

## 2023-06-23 RX ADMIN — SODIUM CHLORIDE, PRESERVATIVE FREE 10 ML: 5 INJECTION INTRAVENOUS at 09:01

## 2023-06-23 RX ADMIN — SUCRALFATE 1 G: 1 TABLET ORAL at 05:20

## 2023-06-23 RX ADMIN — GABAPENTIN 400 MG: 400 CAPSULE ORAL at 17:20

## 2023-06-23 RX ADMIN — GABAPENTIN 800 MG: 400 CAPSULE ORAL at 20:48

## 2023-06-23 RX ADMIN — PANTOPRAZOLE SODIUM 40 MG: 40 TABLET, DELAYED RELEASE ORAL at 05:10

## 2023-06-23 RX ADMIN — GABAPENTIN 400 MG: 400 CAPSULE ORAL at 08:59

## 2023-06-23 RX ADMIN — DULOXETINE HYDROCHLORIDE 60 MG: 60 CAPSULE, DELAYED RELEASE ORAL at 08:59

## 2023-06-23 RX ADMIN — CALCIUM 500 MG: 500 TABLET ORAL at 08:59

## 2023-06-23 RX ADMIN — LEVOTHYROXINE SODIUM 50 MCG: 0.05 TABLET ORAL at 05:10

## 2023-06-23 RX ADMIN — ONDANSETRON 4 MG: 2 INJECTION INTRAMUSCULAR; INTRAVENOUS at 13:00

## 2023-06-23 RX ADMIN — LORAZEPAM 0.5 MG: 0.5 TABLET ORAL at 23:49

## 2023-06-23 RX ADMIN — SUCRALFATE 1 G: 1 TABLET ORAL at 12:10

## 2023-06-23 RX ADMIN — CARVEDILOL 6.25 MG: 3.12 TABLET, FILM COATED ORAL at 17:18

## 2023-06-23 RX ADMIN — FENOFIBRATE 160 MG: 160 TABLET ORAL at 08:59

## 2023-06-23 RX ADMIN — HYDROCODONE BITARTRATE AND ACETAMINOPHEN 1 TABLET: 10; 325 TABLET ORAL at 05:10

## 2023-06-23 RX ADMIN — AMIODARONE HYDROCHLORIDE 200 MG: 200 TABLET ORAL at 08:59

## 2023-06-23 RX ADMIN — CEFEPIME 2000 MG: 2 INJECTION, POWDER, FOR SOLUTION INTRAVENOUS at 12:10

## 2023-06-23 RX ADMIN — FERROUS SULFATE TAB 325 MG (65 MG ELEMENTAL FE) 325 MG: 325 (65 FE) TAB at 08:59

## 2023-06-23 RX ADMIN — MAGNESIUM GLUCONATE 500 MG ORAL TABLET 400 MG: 500 TABLET ORAL at 20:49

## 2023-06-23 RX ADMIN — SUCRALFATE 1 G: 1 TABLET ORAL at 17:18

## 2023-06-23 ASSESSMENT — PAIN SCALES - GENERAL
PAINLEVEL_OUTOF10: 6
PAINLEVEL_OUTOF10: 7
PAINLEVEL_OUTOF10: 0

## 2023-06-23 ASSESSMENT — PAIN DESCRIPTION - ORIENTATION: ORIENTATION: LEFT

## 2023-06-23 ASSESSMENT — PAIN DESCRIPTION - LOCATION
LOCATION: BACK
LOCATION: BACK

## 2023-06-23 ASSESSMENT — PAIN DESCRIPTION - DESCRIPTORS
DESCRIPTORS: ACHING
DESCRIPTORS: ACHING

## 2023-06-24 PROCEDURE — 6370000000 HC RX 637 (ALT 250 FOR IP): Performed by: NURSE PRACTITIONER

## 2023-06-24 PROCEDURE — 51702 INSERT TEMP BLADDER CATH: CPT

## 2023-06-24 PROCEDURE — 2580000003 HC RX 258: Performed by: NURSE PRACTITIONER

## 2023-06-24 PROCEDURE — 6360000002 HC RX W HCPCS: Performed by: NURSE PRACTITIONER

## 2023-06-24 PROCEDURE — 1100000000 HC RM PRIVATE

## 2023-06-24 PROCEDURE — 6370000000 HC RX 637 (ALT 250 FOR IP): Performed by: PHYSICIAN ASSISTANT

## 2023-06-24 PROCEDURE — 6370000000 HC RX 637 (ALT 250 FOR IP): Performed by: FAMILY MEDICINE

## 2023-06-24 PROCEDURE — 2580000003 HC RX 258: Performed by: FAMILY MEDICINE

## 2023-06-24 RX ADMIN — SUCRALFATE 1 G: 1 TABLET ORAL at 21:48

## 2023-06-24 RX ADMIN — FERROUS SULFATE TAB 325 MG (65 MG ELEMENTAL FE) 325 MG: 325 (65 FE) TAB at 08:45

## 2023-06-24 RX ADMIN — SODIUM CHLORIDE, PRESERVATIVE FREE 10 ML: 5 INJECTION INTRAVENOUS at 08:46

## 2023-06-24 RX ADMIN — LORAZEPAM 0.5 MG: 0.5 TABLET ORAL at 10:31

## 2023-06-24 RX ADMIN — LORAZEPAM 0.5 MG: 0.5 TABLET ORAL at 21:49

## 2023-06-24 RX ADMIN — DULOXETINE HYDROCHLORIDE 60 MG: 60 CAPSULE, DELAYED RELEASE ORAL at 08:45

## 2023-06-24 RX ADMIN — CARVEDILOL 6.25 MG: 3.12 TABLET, FILM COATED ORAL at 16:14

## 2023-06-24 RX ADMIN — CARVEDILOL 6.25 MG: 3.12 TABLET, FILM COATED ORAL at 08:45

## 2023-06-24 RX ADMIN — MIRTAZAPINE 15 MG: 15 TABLET, FILM COATED ORAL at 21:48

## 2023-06-24 RX ADMIN — AMIODARONE HYDROCHLORIDE 200 MG: 200 TABLET ORAL at 08:44

## 2023-06-24 RX ADMIN — CALCIUM 500 MG: 500 TABLET ORAL at 08:45

## 2023-06-24 RX ADMIN — GABAPENTIN 400 MG: 400 CAPSULE ORAL at 08:45

## 2023-06-24 RX ADMIN — SUCRALFATE 1 G: 1 TABLET ORAL at 10:28

## 2023-06-24 RX ADMIN — FENOFIBRATE 160 MG: 160 TABLET ORAL at 08:45

## 2023-06-24 RX ADMIN — GABAPENTIN 800 MG: 400 CAPSULE ORAL at 21:47

## 2023-06-24 RX ADMIN — ACETAMINOPHEN 650 MG: 325 TABLET ORAL at 23:47

## 2023-06-24 RX ADMIN — MAGNESIUM GLUCONATE 500 MG ORAL TABLET 400 MG: 500 TABLET ORAL at 08:45

## 2023-06-24 RX ADMIN — SUCRALFATE 1 G: 1 TABLET ORAL at 16:14

## 2023-06-24 RX ADMIN — MAGNESIUM GLUCONATE 500 MG ORAL TABLET 400 MG: 500 TABLET ORAL at 21:47

## 2023-06-24 RX ADMIN — SODIUM CHLORIDE, PRESERVATIVE FREE 10 ML: 5 INJECTION INTRAVENOUS at 21:51

## 2023-06-24 RX ADMIN — HYDROXYZINE HYDROCHLORIDE 25 MG: 25 TABLET, FILM COATED ORAL at 23:47

## 2023-06-24 RX ADMIN — CEFEPIME 2000 MG: 2 INJECTION, POWDER, FOR SOLUTION INTRAVENOUS at 11:53

## 2023-06-24 RX ADMIN — GABAPENTIN 400 MG: 400 CAPSULE ORAL at 16:14

## 2023-06-24 ASSESSMENT — PAIN SCALES - GENERAL
PAINLEVEL_OUTOF10: 2
PAINLEVEL_OUTOF10: 7

## 2023-06-24 ASSESSMENT — PAIN DESCRIPTION - ORIENTATION: ORIENTATION: LOWER

## 2023-06-24 ASSESSMENT — PAIN - FUNCTIONAL ASSESSMENT: PAIN_FUNCTIONAL_ASSESSMENT: PREVENTS OR INTERFERES SOME ACTIVE ACTIVITIES AND ADLS

## 2023-06-24 ASSESSMENT — PAIN DESCRIPTION - DESCRIPTORS: DESCRIPTORS: ACHING

## 2023-06-24 ASSESSMENT — PAIN DESCRIPTION - LOCATION: LOCATION: BACK

## 2023-06-25 VITALS
WEIGHT: 149.25 LBS | BODY MASS INDEX: 29.3 KG/M2 | HEIGHT: 60 IN | TEMPERATURE: 97.5 F | HEART RATE: 94 BPM | DIASTOLIC BLOOD PRESSURE: 88 MMHG | OXYGEN SATURATION: 97 % | SYSTOLIC BLOOD PRESSURE: 123 MMHG | RESPIRATION RATE: 19 BRPM

## 2023-06-25 PROCEDURE — 6370000000 HC RX 637 (ALT 250 FOR IP): Performed by: NURSE PRACTITIONER

## 2023-06-25 PROCEDURE — 6360000002 HC RX W HCPCS: Performed by: NURSE PRACTITIONER

## 2023-06-25 PROCEDURE — 6370000000 HC RX 637 (ALT 250 FOR IP): Performed by: FAMILY MEDICINE

## 2023-06-25 PROCEDURE — 2580000003 HC RX 258: Performed by: FAMILY MEDICINE

## 2023-06-25 PROCEDURE — 2580000003 HC RX 258: Performed by: NURSE PRACTITIONER

## 2023-06-25 RX ORDER — HYDROXYZINE HYDROCHLORIDE 25 MG/1
25 TABLET, FILM COATED ORAL 3 TIMES DAILY PRN
Qty: 30 TABLET | Refills: 0 | Status: SHIPPED | OUTPATIENT
Start: 2023-06-25 | End: 2023-07-25

## 2023-06-25 RX ORDER — BISACODYL 10 MG
10 SUPPOSITORY, RECTAL RECTAL ONCE
Status: COMPLETED | OUTPATIENT
Start: 2023-06-25 | End: 2023-06-25

## 2023-06-25 RX ORDER — BISACODYL 10 MG
10 SUPPOSITORY, RECTAL RECTAL DAILY PRN
Status: DISCONTINUED | OUTPATIENT
Start: 2023-06-25 | End: 2023-06-25

## 2023-06-25 RX ORDER — DULOXETIN HYDROCHLORIDE 30 MG/1
60 CAPSULE, DELAYED RELEASE ORAL DAILY
Qty: 60 CAPSULE | Refills: 1 | Status: SHIPPED | OUTPATIENT
Start: 2023-06-25 | End: 2023-08-24

## 2023-06-25 RX ADMIN — SUCRALFATE 1 G: 1 TABLET ORAL at 09:07

## 2023-06-25 RX ADMIN — AMIODARONE HYDROCHLORIDE 200 MG: 200 TABLET ORAL at 09:08

## 2023-06-25 RX ADMIN — SUCRALFATE 1 G: 1 TABLET ORAL at 05:57

## 2023-06-25 RX ADMIN — LEVOTHYROXINE SODIUM 50 MCG: 0.05 TABLET ORAL at 05:57

## 2023-06-25 RX ADMIN — GABAPENTIN 400 MG: 400 CAPSULE ORAL at 09:07

## 2023-06-25 RX ADMIN — BISACODYL 10 MG: 10 SUPPOSITORY RECTAL at 11:15

## 2023-06-25 RX ADMIN — SODIUM CHLORIDE, PRESERVATIVE FREE 10 ML: 5 INJECTION INTRAVENOUS at 09:09

## 2023-06-25 RX ADMIN — FERROUS SULFATE TAB 325 MG (65 MG ELEMENTAL FE) 325 MG: 325 (65 FE) TAB at 09:07

## 2023-06-25 RX ADMIN — CARVEDILOL 6.25 MG: 3.12 TABLET, FILM COATED ORAL at 09:08

## 2023-06-25 RX ADMIN — HYDROCODONE BITARTRATE AND ACETAMINOPHEN 1 TABLET: 10; 325 TABLET ORAL at 09:06

## 2023-06-25 RX ADMIN — CEFEPIME 2000 MG: 2 INJECTION, POWDER, FOR SOLUTION INTRAVENOUS at 10:57

## 2023-06-25 RX ADMIN — FENOFIBRATE 160 MG: 160 TABLET ORAL at 09:09

## 2023-06-25 RX ADMIN — PANTOPRAZOLE SODIUM 40 MG: 40 TABLET, DELAYED RELEASE ORAL at 05:57

## 2023-06-25 RX ADMIN — DULOXETINE HYDROCHLORIDE 60 MG: 60 CAPSULE, DELAYED RELEASE ORAL at 09:08

## 2023-06-25 RX ADMIN — CALCIUM 500 MG: 500 TABLET ORAL at 09:07

## 2023-06-25 RX ADMIN — MAGNESIUM GLUCONATE 500 MG ORAL TABLET 400 MG: 500 TABLET ORAL at 09:07

## 2023-06-25 ASSESSMENT — PAIN SCALES - GENERAL
PAINLEVEL_OUTOF10: 0
PAINLEVEL_OUTOF10: 3
PAINLEVEL_OUTOF10: 9

## 2023-06-25 ASSESSMENT — PAIN DESCRIPTION - DESCRIPTORS: DESCRIPTORS: ACHING

## 2023-06-25 ASSESSMENT — PAIN DESCRIPTION - LOCATION: LOCATION: BACK

## 2023-06-25 ASSESSMENT — PAIN SCALES - WONG BAKER: WONGBAKER_NUMERICALRESPONSE: 2

## 2023-06-29 ENCOUNTER — TELEPHONE (OUTPATIENT)
Age: 88
End: 2023-06-29

## 2023-12-01 NOTE — PROGRESS NOTES
Problem: Self Care Deficits Care Plan (Adult)  Goal: *Acute Goals and Plan of Care (Insert Text)  1. Patient will complete lower body bathing and dressing with minimal assistance and adaptive equipment as needed. 2. Patient will complete toileting with minimal assistance . 3. Patient will tolerate 25 minutes of OT treatment with 2-3 rest breaks to increase activity tolerance for ADLs. 4. Patient will complete functional transfers with supervision and adaptive equipment as needed. 5. Patient will complete functional mobility with appropriate safety and supervision for household distances. Timeframe: 7 visits       OCCUPATIONAL THERAPY: Initial Assessment and AM 3/30/2018  INPATIENT: Hospital Day: 2  [place] / Product Type: Medicare /      NAME/AGE/GENDER: Ramon Lima is a 80 y.o. female   PRIMARY DIAGNOSIS:  Atrial fibrillation with RVR ([address] Atrial fibrillation with RVR ([address] Atrial fibrillation with RVR ([address]        ICD-10: Treatment Diagnosis:    · Generalized Muscle Weakness (M62.81)  · Other lack of cordination (R27.8)  · History of falling (Z91.81)   Precautions/Allergies:     Keflex [cephalexin]; Pcn [penicillins]; and Sulfa (sulfonamide antibiotics)      ASSESSMENT:     Ms. Klaudia Acevedo presents to the hospital with a-fib with RVR. Pt was sitting up in the chair upon arrival. Pt verbalizes that she is nervous and demonstrates extremely anxious behavior. Pt reports that she lives alone. Pt does have hx of falls and unable to recall if she had therapy in the past. Pt states she had a recent CTS surgery in the L UE and has multiple deformities of the hands and hx of arthritis. Pt presents with overall generalized weakness. Pt requires minimal assistance and additional time to stand from chair. Pt needed time to adjust LEs with hx of injury to L knee. Pt completed functional mobility with walker in room and needed assurance throughout.  Pt required minimal no assistance and cues to hold head at midline (forward flexion at the neck). Pt left up working with PT. Pt currently appears fairly frail and debilitated impacting safety and independence with ADL. Pt states normally she is able to complete basic ADL, but I imagine it is with difficulty. Pt appears anxious about therapy working with her to get her back in shape to go home. Pt is currently functioning below baseline and will benefit from OT services to address stated goals and plan of care. This section established at most recent assessment   PROBLEM LIST (Impairments causing functional limitations):  1. Decreased Strength  2. Decreased ADL/Functional Activities  3. Decreased Transfer Abilities  4. Decreased Ambulation Ability/Technique  5. Decreased Balance  6. Increased Pain  7. Decreased Activity Tolerance  8. Decreased Flexibility/Joint Mobility  9. Decreased Skin Integrity/Hygeine  10. Decreased Leslie with Home Exercise Program  11. Decreased Cognition   INTERVENTIONS PLANNED: (Benefits and precautions of occupational therapy have been discussed with the patient.)  1. Activities of daily living training  2. Adaptive equipment training  3. Balance training  4. Clothing management  5. Cognitive training  6. Donning&doffing training  7. Group therapy  8. Neuromuscular re-eduation  9. Therapeutic activity  10. Therapeutic exercise     TREATMENT PLAN: Frequency/Duration: Follow patient 3 times per week to address above goals. Rehabilitation Potential For Stated Goals: Good     RECOMMENDED REHABILITATION/EQUIPMENT: (at time of discharge pending progress): Due to the probability of continued deficits (see above) this patient will likely need continued skilled occupational therapy after discharge.   Equipment:    TBD              OCCUPATIONAL PROFILE AND HISTORY:   History of Present Injury/Illness (Reason for Referral):  See H&P  Past Medical History/Comorbidities:   Ms. Rosa Nurse  has a past medical history of Arthritis; CAD (coronary artery disease); CHF (congestive heart failure) ([address] Depression; Hypertension; Hypothyroidism; Neuropathy; and PUD (peptic ulcer disease). Ms. Pradip Simms  has a past surgical history that includes pr cardiac surg procedure unlist; hx appendectomy; hx cholecystectomy; hx orthopaedic; hx heent; and hx heart catheterization. Social History/Living Environment:   Home Environment: Private residence  # Steps to Enter: 6  Rails to Enter: Yes  One/Two Story Residence: One story  Living Alone: Yes  Support Systems: Child(ulisses)  Patient Expects to be Discharged to[de-identified] Private residence  Current DME Used/Available at Home: Grab bars, Shower chair, Walker, rollator  Tub or Shower Type: Shower  Prior Level of Function/Work/Activity:  Pt lives at home alone. Pt reports family brings her meals. Pt typically completes ADL/functional mobility with assistive device. Pt has rollator, shower chair, and grab bars. Personal Factors:          Age:  80 y.o. Social Background:  Live alone with hx of falls        Overall Behavior:  Anxious        Other factors that influence how disability is experienced by the patient:  Multiple co-morbidities    Number of Personal Factors/Comorbidities that affect the Plan of Care: Extensive review of physical, cognitive, and psychosocial performance (3+):  HIGH COMPLEXITY   ASSESSMENT OF OCCUPATIONAL PERFORMANCE[de-identified]   Activities of Daily Living:           Basic ADLs (From Assessment) Complex ADLs (From Assessment)   Basic ADL  Feeding: Supervision  Oral Facial Hygiene/Grooming: Minimum assistance  Bathing: Moderate assistance  Type of Bath: Chlorhexidine (CHG)  Upper Body Dressing: Moderate assistance  Lower Body Dressing: Maximum assistance  Toileting: Moderate assistance Instrumental ADL  Meal Preparation: Maximum assistance  Homemaking:  Total assistance   Grooming/Bathing/Dressing Activities of Daily Living     Cognitive Retraining  Safety/Judgement: Decreased awareness of need for assistance; Fall prevention;Home safety                 Functional Transfers  Toilet Transfer : Moderate assistance  Tub Transfer: Maximum assistance  Shower Transfer: Moderate assistance     Bed/Mat Mobility  Sit to Stand: Minimum assistance  Bed to Chair: Minimum assistance  Scooting: Contact guard assistance       Most Recent Physical Functioning:   Gross Assessment:  AROM: Generally decreased, functional (B UE)  Strength: Generally decreased, functional (B UE)  Coordination: Generally decreased, functional (B UE)  Sensation: Intact (B UE)               Posture:     Balance:  Sitting: Impaired  Sitting - Static: Good (unsupported)  Sitting - Dynamic: Fair (occasional)  Standing: Impaired  Standing - Static: Constant support  Standing - Dynamic : Poor Bed Mobility:  Scooting: Contact guard assistance  Wheelchair Mobility:     Transfers:  Sit to Stand: Minimum assistance  Stand to Sit: Minimum assistance  Bed to Chair: Minimum assistance              Patient Vitals for the past 6 hrs:   BP BP Patient Position SpO2 O2 Flow Rate (L/min) Pulse   03/30/18 0700 - - - 2 l/min -   03/30/18 0833 112/57 At rest 92 % - 65       Mental Status  Neurologic State: Alert  Orientation Level: Oriented X4  Cognition: Decreased attention/concentration, Follows commands  Perception: Appears intact  Perseveration: Perseverates during conversation  Safety/Judgement: Decreased awareness of need for assistance, Fall prevention, Home safety                          Physical Skills Involved:  1. Range of Motion  2. Balance  3. Strength  4. Activity Tolerance  5. Fine Motor Control  6. Gross Motor Control  7. Pain (Chronic)  8. Edema  9. Skin Integrity Cognitive Skills Affected (resulting in the inability to perform in a timely and safe manner):  1. Executive Function  2. Divided Attention Psychosocial Skills Affected:  1. Habits/Routines  2. Environmental Adaptation  3.  Self-Awareness   Number of elements that affect the Plan of Care: 5+:  HIGH COMPLEXITY   CLINICAL DECISION MAKIN[address] [place] [id number] AM-PAC 6 Clicks   Daily Activity Inpatient Short Form  How much help from another person does the patient currently need. .. Total A Lot A Little None   1. Putting on and taking off regular lower body clothing? [] 1   [x] 2   [] 3   [] 4   2. Bathing (including washing, rinsing, drying)? [] 1   [x] 2   [] 3   [] 4   3. Toileting, which includes using toilet, bedpan or urinal?   [] 1   [x] 2   [] 3   [] 4   4. Putting on and taking off regular upper body clothing? [] 1   [x] 2   [] 3   [] 4   5. Taking care of personal grooming such as brushing teeth? [] 1   [] 2   [x] 3   [] 4   6. Eating meals? [] 1   [] 2   [x] 3   [] 4   © , Trustees of [address] [place], [place] [address], under license to [employer]. All rights reserved      Score:  Initial: 14 Most Recent: X (Date: -- )    Interpretation of Tool:  Represents activities that are increasingly more difficult (i.e. Bed mobility, Transfers, Gait). Score 24 23 22-20 19-15 14-10 9-7 6     Modifier CH CI CJ CK CL CM CN      ? Self Care:     - CURRENT STATUS: CL - 60%-79% impaired, limited or restricted    - GOAL STATUS: CK - 40%-59% impaired, limited or restricted    - D/C STATUS:  ---------------To be determined---------------  [place] / Product Type: Medicare /      Medical Necessity:     · Patient demonstrates good rehab potential due to higher previous functional level. Reason for Services/Other Comments:  · Patient continues to require skilled intervention due to decreased independence with ADL/functional transfers.    Use of outcome tool(s) and clinical judgement create a POC that gives a: MODERATE COMPLEXITY         TREATMENT:   (In addition to Assessment/Re-Assessment sessions the following treatments were rendered)     Pre-treatment Symptoms/Complaints:  =  Pain: Initial:   Pain Intensity 1: 0  Post Session:  0/10 Assessment/Reassessment only, no treatment provided today    Braces/Orthotics/Lines/Etc:   · IV  · O2 Device: Nasal cannula  Treatment/Session Assessment:    · Response to Treatment:  Evaluation only. · Interdisciplinary Collaboration:   o Physical Therapist  o Occupational Therapist  o Registered Nurse  · After treatment position/precautions:   o working with PT   · Compliance with Program/Exercises: Will assess as treatment progresses. · Recommendations/Intent for next treatment session: \"Next visit will focus on advancements to more challenging activities and reduction in assistance provided\".   Total Treatment Duration:  OT Patient Time In/Time Out  Time In: 1006  Time Out: [address] [facility],

## (undated) DEVICE — GAUZE,SPONGE,4"X4",12PLY,WOVEN,NS,LF: Brand: MEDLINE

## (undated) DEVICE — LUBE JELLY FOIL PACK 1.4 OZ: Brand: MEDLINE INDUSTRIES, INC.

## (undated) DEVICE — CONNECTOR TBNG OD5-7MM O2 END DISP

## (undated) DEVICE — CANNULA NSL ORAL AD FOR CAPNOFLEX CO2 O2 AIRLFE

## (undated) DEVICE — KENDALL RADIOLUCENT FOAM MONITORING ELECTRODE RECTANGULAR SHAPE: Brand: KENDALL

## (undated) DEVICE — SYRINGE MED 3ML CLR PLAS STD N CTRL LUERLOCK TIP DISP

## (undated) DEVICE — YANKAUER,BULB TIP,W/O VENT,RIGID,STERILE: Brand: MEDLINE

## (undated) DEVICE — BLOCK BITE AD 60FR W/ VELC STRP ADDRESSES MOST PT AND

## (undated) DEVICE — SYRINGE MED 10ML LUERLOCK TIP W/O SFTY DISP

## (undated) DEVICE — SYRINGE, LUER SLIP, STERILE, 60ML: Brand: MEDLINE

## (undated) DEVICE — SYR 50ML SLIP TIP NSAF LF STRL --

## (undated) DEVICE — FORCEPS ENDOSCP L230CM 2.4MM GRSP RATCH TOOTH RETRV

## (undated) DEVICE — CAESAR GRASPING FORCEPS: Brand: CAESAR

## (undated) DEVICE — SINGLE PORT MANIFOLD: Brand: NEPTUNE 2

## (undated) DEVICE — AIRLIFE™ OXYGEN TUBING 7 FEET (2.1 M) CRUSH RESISTANT OXYGEN TUBING, VINYL TIPPED: Brand: AIRLIFE™

## (undated) DEVICE — RETRIEVER ENDOSCP L230CM DIA2.5MM NET W3XL5.5CM MIN WRK CHN

## (undated) DEVICE — ESOPHAGEAL BALLOON DILATATION CATHETER: Brand: CRE FIXED WIRE

## (undated) DEVICE — NEEDLE SYR 18GA L1.5IN RED PLAS HUB S STL BLNT FILL W/O

## (undated) DEVICE — SNARE POLYP SM W13MMXL240CM SHTH DIA2.4MM OVL FLX DISP